# Patient Record
Sex: MALE | Race: ASIAN | NOT HISPANIC OR LATINO | URBAN - METROPOLITAN AREA
[De-identification: names, ages, dates, MRNs, and addresses within clinical notes are randomized per-mention and may not be internally consistent; named-entity substitution may affect disease eponyms.]

---

## 2021-02-05 ENCOUNTER — INPATIENT (INPATIENT)
Facility: HOSPITAL | Age: 51
LOS: 3 days | Discharge: HOME | DRG: 177 | End: 2021-02-09
Attending: INTERNAL MEDICINE | Admitting: INTERNAL MEDICINE
Payer: COMMERCIAL

## 2021-02-05 VITALS
RESPIRATION RATE: 16 BRPM | DIASTOLIC BLOOD PRESSURE: 92 MMHG | WEIGHT: 218.26 LBS | TEMPERATURE: 97 F | HEART RATE: 107 BPM | OXYGEN SATURATION: 99 % | SYSTOLIC BLOOD PRESSURE: 161 MMHG

## 2021-02-05 LAB
ALBUMIN SERPL ELPH-MCNC: 3.7 G/DL — SIGNIFICANT CHANGE UP (ref 3.5–5)
ALP SERPL-CCNC: 66 U/L — SIGNIFICANT CHANGE UP (ref 40–120)
ALT FLD-CCNC: 34 U/L DA — SIGNIFICANT CHANGE UP (ref 10–60)
ANION GAP SERPL CALC-SCNC: 9 MMOL/L — SIGNIFICANT CHANGE UP (ref 5–17)
APTT BLD: 34.3 SEC — SIGNIFICANT CHANGE UP (ref 27.5–35.5)
AST SERPL-CCNC: 19 U/L — SIGNIFICANT CHANGE UP (ref 10–40)
BASOPHILS # BLD AUTO: 0.01 K/UL — SIGNIFICANT CHANGE UP (ref 0–0.2)
BASOPHILS NFR BLD AUTO: 0.2 % — SIGNIFICANT CHANGE UP (ref 0–2)
BILIRUB SERPL-MCNC: 0.7 MG/DL — SIGNIFICANT CHANGE UP (ref 0.2–1.2)
BUN SERPL-MCNC: 13 MG/DL — SIGNIFICANT CHANGE UP (ref 7–18)
CALCIUM SERPL-MCNC: 8.7 MG/DL — SIGNIFICANT CHANGE UP (ref 8.4–10.5)
CHLORIDE SERPL-SCNC: 105 MMOL/L — SIGNIFICANT CHANGE UP (ref 96–108)
CO2 SERPL-SCNC: 26 MMOL/L — SIGNIFICANT CHANGE UP (ref 22–31)
CREAT SERPL-MCNC: 0.75 MG/DL — SIGNIFICANT CHANGE UP (ref 0.5–1.3)
EOSINOPHIL # BLD AUTO: 0.02 K/UL — SIGNIFICANT CHANGE UP (ref 0–0.5)
EOSINOPHIL NFR BLD AUTO: 0.3 % — SIGNIFICANT CHANGE UP (ref 0–6)
GLUCOSE SERPL-MCNC: 131 MG/DL — HIGH (ref 70–99)
HCT VFR BLD CALC: 39.3 % — SIGNIFICANT CHANGE UP (ref 39–50)
HGB BLD-MCNC: 13.3 G/DL — SIGNIFICANT CHANGE UP (ref 13–17)
IMM GRANULOCYTES NFR BLD AUTO: 0.2 % — SIGNIFICANT CHANGE UP (ref 0–1.5)
INR BLD: 1.09 RATIO — SIGNIFICANT CHANGE UP (ref 0.88–1.16)
LYMPHOCYTES # BLD AUTO: 1.02 K/UL — SIGNIFICANT CHANGE UP (ref 1–3.3)
LYMPHOCYTES # BLD AUTO: 17.2 % — SIGNIFICANT CHANGE UP (ref 13–44)
MAGNESIUM SERPL-MCNC: 1.9 MG/DL — SIGNIFICANT CHANGE UP (ref 1.6–2.6)
MCHC RBC-ENTMCNC: 30.3 PG — SIGNIFICANT CHANGE UP (ref 27–34)
MCHC RBC-ENTMCNC: 33.8 GM/DL — SIGNIFICANT CHANGE UP (ref 32–36)
MCV RBC AUTO: 89.5 FL — SIGNIFICANT CHANGE UP (ref 80–100)
MONOCYTES # BLD AUTO: 0.51 K/UL — SIGNIFICANT CHANGE UP (ref 0–0.9)
MONOCYTES NFR BLD AUTO: 8.6 % — SIGNIFICANT CHANGE UP (ref 2–14)
NEUTROPHILS # BLD AUTO: 4.35 K/UL — SIGNIFICANT CHANGE UP (ref 1.8–7.4)
NEUTROPHILS NFR BLD AUTO: 73.5 % — SIGNIFICANT CHANGE UP (ref 43–77)
NRBC # BLD: 0 /100 WBCS — SIGNIFICANT CHANGE UP (ref 0–0)
NT-PROBNP SERPL-SCNC: 1705 PG/ML — HIGH (ref 0–125)
PLATELET # BLD AUTO: 215 K/UL — SIGNIFICANT CHANGE UP (ref 150–400)
POTASSIUM SERPL-MCNC: 3.6 MMOL/L — SIGNIFICANT CHANGE UP (ref 3.5–5.3)
POTASSIUM SERPL-SCNC: 3.6 MMOL/L — SIGNIFICANT CHANGE UP (ref 3.5–5.3)
PROT SERPL-MCNC: 7.2 G/DL — SIGNIFICANT CHANGE UP (ref 6–8.3)
PROTHROM AB SERPL-ACNC: 12.9 SEC — SIGNIFICANT CHANGE UP (ref 10.6–13.6)
RBC # BLD: 4.39 M/UL — SIGNIFICANT CHANGE UP (ref 4.2–5.8)
RBC # FLD: 12.7 % — SIGNIFICANT CHANGE UP (ref 10.3–14.5)
SODIUM SERPL-SCNC: 140 MMOL/L — SIGNIFICANT CHANGE UP (ref 135–145)
TROPONIN I SERPL-MCNC: 0.39 NG/ML — HIGH (ref 0–0.04)
WBC # BLD: 5.92 K/UL — SIGNIFICANT CHANGE UP (ref 3.8–10.5)
WBC # FLD AUTO: 5.92 K/UL — SIGNIFICANT CHANGE UP (ref 3.8–10.5)

## 2021-02-05 PROCEDURE — 99285 EMERGENCY DEPT VISIT HI MDM: CPT

## 2021-02-05 PROCEDURE — 71046 X-RAY EXAM CHEST 2 VIEWS: CPT | Mod: 26

## 2021-02-05 RX ORDER — ASPIRIN/CALCIUM CARB/MAGNESIUM 324 MG
324 TABLET ORAL ONCE
Refills: 0 | Status: COMPLETED | OUTPATIENT
Start: 2021-02-05 | End: 2021-02-06

## 2021-02-05 RX ORDER — FUROSEMIDE 40 MG
40 TABLET ORAL ONCE
Refills: 0 | Status: COMPLETED | OUTPATIENT
Start: 2021-02-05 | End: 2021-02-05

## 2021-02-05 RX ORDER — NITROGLYCERIN 6.5 MG
0.4 CAPSULE, EXTENDED RELEASE ORAL ONCE
Refills: 0 | Status: COMPLETED | OUTPATIENT
Start: 2021-02-05 | End: 2021-02-05

## 2021-02-05 RX ORDER — DIAZEPAM 5 MG
2 TABLET ORAL ONCE
Refills: 0 | Status: DISCONTINUED | OUTPATIENT
Start: 2021-02-05 | End: 2021-02-05

## 2021-02-05 RX ADMIN — Medication 1.25 MILLIGRAM(S): at 23:15

## 2021-02-05 RX ADMIN — Medication 40 MILLIGRAM(S): at 23:15

## 2021-02-05 RX ADMIN — Medication 0.4 MILLIGRAM(S): at 23:16

## 2021-02-05 NOTE — ED PROVIDER NOTE - OBJECTIVE STATEMENT
50 y/o M pt with a PMHx of CVA 10 years ago (no residual deficits), HTN with no medication, anxiety, presents to the ED with complaints of 1 month of worsening shortness of breath, dyspnea on exertion, chest pain on exertion, orthopnea, dry cough and b/l leg swelling increasing over the last week. Patient reports today when going down stairs at home, he felt lightheaded and almost passed out. Notes he has been attributing symptoms to chiropractor visits for 1 month, though there is no clear reason how that is related. No history of CHF, heart problems or blood clots. States he has been seen by his PMD 5 months ago and offered blood pressure medication but has not been taking them. Patient denies fever or infectious symptoms, urinary symptoms, focal numbness or weakness, nausea, vomiting, or other recent illnesses or hospitalizations.

## 2021-02-05 NOTE — ED PROVIDER NOTE - CHPI ED SYMPTOMS NEG
no infectious symptoms, no urinary symptoms, no focal numbness or weakness, no nausea, no vomiting/no fever

## 2021-02-05 NOTE — ED ADULT TRIAGE NOTE - CHIEF COMPLAINT QUOTE
I have shortness of breath after chiropractic therapy x 1 month, getting progressively worse.  I went to see the chiropractor for back pain

## 2021-02-05 NOTE — ED PROVIDER NOTE - EKG ADDITIONAL INFORMATION FREE TEXT
Pt tolerated flu and td vaccine to right deltoid without difficulty; no adverse reaction noted; VIS given     LBBB, NSR, No depressions/elevations

## 2021-02-05 NOTE — ED PROVIDER NOTE - CLINICAL SUMMARY MEDICAL DECISION MAKING FREE TEXT BOX
50 y/o M pt presents with classic symptoms of progressive heart failure over 1 month. Bedside US showing decreased EF likely under 30% with no pericardial effusion and CXR showing b/l pleural effusion and PE. EKG showing incomplete LBBB. Labs pending. Giving Lasix, Enalaprilat and Nitro. Will plan to admit patient. Patient is stable, will reassess. 52 y/o M pt presents with classic symptoms of progressive heart failure over 1 month. Bedside US showing decreased EF likely under 30% with no pericardial effusion and CXR showing b/l pleural effusion and PE. EKG showing incomplete LBBB. Labs pending. Giving Lasix, Enalaprilat and Nitro. Will plan to admit patient. Patient is stable, will reassess.    Labs showing mild trop elevation (no active chest pain nor chest pain today) and BNP elevation. Giving ASA and admitting for new CHF. Pt stable and endorsed to inpatient team.

## 2021-02-05 NOTE — ED PROVIDER NOTE - PHYSICAL EXAMINATION
Vital Signs Reviewed  GEN: Comfortable, NAD, AAOx3, speaking in full sentences.   HEENT: NCAT, EOMI, MMM, Neck Supple  RESP: CTAB, b/l rales with normal air entry and nml work of breathing   CV: RRR, S1S2, No murmurs  ABD: No TTP, ND, No masses, No CVA Tenderness  Extrem/Skin: Equal pulses bilat, skin with b/l 1+ pitting edema without skin changes   Neuro: No focal deficits

## 2021-02-05 NOTE — ED ADULT NURSE NOTE - OBJECTIVE STATEMENT
came to ED due to pain left lower ribs goes to back for a month not getting better , states under chiropractor's care. came to ED due to pain left lower ribs goes to back for a month not getting better with shortness of breath at times, states under chiropractor's care.

## 2021-02-06 DIAGNOSIS — Z29.9 ENCOUNTER FOR PROPHYLACTIC MEASURES, UNSPECIFIED: ICD-10-CM

## 2021-02-06 DIAGNOSIS — E11.9 TYPE 2 DIABETES MELLITUS WITHOUT COMPLICATIONS: ICD-10-CM

## 2021-02-06 DIAGNOSIS — I50.9 HEART FAILURE, UNSPECIFIED: ICD-10-CM

## 2021-02-06 DIAGNOSIS — Z78.9 OTHER SPECIFIED HEALTH STATUS: Chronic | ICD-10-CM

## 2021-02-06 DIAGNOSIS — I10 ESSENTIAL (PRIMARY) HYPERTENSION: ICD-10-CM

## 2021-02-06 DIAGNOSIS — F41.9 ANXIETY DISORDER, UNSPECIFIED: ICD-10-CM

## 2021-02-06 DIAGNOSIS — I50.20 UNSPECIFIED SYSTOLIC (CONGESTIVE) HEART FAILURE: ICD-10-CM

## 2021-02-06 DIAGNOSIS — U07.1 COVID-19: ICD-10-CM

## 2021-02-06 LAB
A1C WITH ESTIMATED AVERAGE GLUCOSE RESULT: 7.8 % — HIGH (ref 4–5.6)
ANION GAP SERPL CALC-SCNC: 8 MMOL/L — SIGNIFICANT CHANGE UP (ref 5–17)
APTT BLD: 31.6 SEC — SIGNIFICANT CHANGE UP (ref 27.5–35.5)
BUN SERPL-MCNC: 12 MG/DL — SIGNIFICANT CHANGE UP (ref 7–18)
CALCIUM SERPL-MCNC: 8.7 MG/DL — SIGNIFICANT CHANGE UP (ref 8.4–10.5)
CHLORIDE SERPL-SCNC: 103 MMOL/L — SIGNIFICANT CHANGE UP (ref 96–108)
CHOLEST SERPL-MCNC: 204 MG/DL — HIGH
CO2 SERPL-SCNC: 29 MMOL/L — SIGNIFICANT CHANGE UP (ref 22–31)
CREAT SERPL-MCNC: 0.79 MG/DL — SIGNIFICANT CHANGE UP (ref 0.5–1.3)
D DIMER BLD IA.RAPID-MCNC: 220 NG/ML DDU — SIGNIFICANT CHANGE UP
ESTIMATED AVERAGE GLUCOSE: 177 MG/DL — HIGH (ref 68–114)
FERRITIN SERPL-MCNC: 1579 NG/ML — HIGH (ref 30–400)
FOLATE SERPL-MCNC: 13.7 NG/ML — SIGNIFICANT CHANGE UP
GLUCOSE BLDC GLUCOMTR-MCNC: 127 MG/DL — HIGH (ref 70–99)
GLUCOSE BLDC GLUCOMTR-MCNC: 159 MG/DL — HIGH (ref 70–99)
GLUCOSE BLDC GLUCOMTR-MCNC: 167 MG/DL — HIGH (ref 70–99)
GLUCOSE BLDC GLUCOMTR-MCNC: 181 MG/DL — HIGH (ref 70–99)
GLUCOSE SERPL-MCNC: 109 MG/DL — HIGH (ref 70–99)
HCT VFR BLD CALC: 38.7 % — LOW (ref 39–50)
HDLC SERPL-MCNC: 42 MG/DL — SIGNIFICANT CHANGE UP
HGB BLD-MCNC: 13.2 G/DL — SIGNIFICANT CHANGE UP (ref 13–17)
INR BLD: 1.16 RATIO — SIGNIFICANT CHANGE UP (ref 0.88–1.16)
LDH SERPL L TO P-CCNC: 167 U/L — SIGNIFICANT CHANGE UP (ref 120–225)
LIPID PNL WITH DIRECT LDL SERPL: 140 MG/DL — HIGH
MAGNESIUM SERPL-MCNC: 2 MG/DL — SIGNIFICANT CHANGE UP (ref 1.6–2.6)
MCHC RBC-ENTMCNC: 30.3 PG — SIGNIFICANT CHANGE UP (ref 27–34)
MCHC RBC-ENTMCNC: 34.1 GM/DL — SIGNIFICANT CHANGE UP (ref 32–36)
MCV RBC AUTO: 88.8 FL — SIGNIFICANT CHANGE UP (ref 80–100)
NON HDL CHOLESTEROL: 162 MG/DL — HIGH
NRBC # BLD: 0 /100 WBCS — SIGNIFICANT CHANGE UP (ref 0–0)
PHOSPHATE SERPL-MCNC: 4.2 MG/DL — SIGNIFICANT CHANGE UP (ref 2.5–4.5)
PLATELET # BLD AUTO: 220 K/UL — SIGNIFICANT CHANGE UP (ref 150–400)
POTASSIUM SERPL-MCNC: 3.2 MMOL/L — LOW (ref 3.5–5.3)
POTASSIUM SERPL-SCNC: 3.2 MMOL/L — LOW (ref 3.5–5.3)
PROCALCITONIN SERPL-MCNC: 0.07 NG/ML — SIGNIFICANT CHANGE UP (ref 0.02–0.1)
PROTHROM AB SERPL-ACNC: 13.7 SEC — HIGH (ref 10.6–13.6)
RBC # BLD: 4.36 M/UL — SIGNIFICANT CHANGE UP (ref 4.2–5.8)
RBC # FLD: 12.8 % — SIGNIFICANT CHANGE UP (ref 10.3–14.5)
SARS-COV-2 RNA SPEC QL NAA+PROBE: DETECTED
SODIUM SERPL-SCNC: 140 MMOL/L — SIGNIFICANT CHANGE UP (ref 135–145)
TRIGL SERPL-MCNC: 108 MG/DL — SIGNIFICANT CHANGE UP
TROPONIN I SERPL-MCNC: 0.33 NG/ML — HIGH (ref 0–0.04)
TROPONIN I SERPL-MCNC: 0.34 NG/ML — HIGH (ref 0–0.04)
TSH SERPL-MCNC: 1.15 UU/ML — SIGNIFICANT CHANGE UP (ref 0.34–4.82)
VIT B12 SERPL-MCNC: 464 PG/ML — SIGNIFICANT CHANGE UP (ref 232–1245)
WBC # BLD: 5.42 K/UL — SIGNIFICANT CHANGE UP (ref 3.8–10.5)
WBC # FLD AUTO: 5.42 K/UL — SIGNIFICANT CHANGE UP (ref 3.8–10.5)

## 2021-02-06 PROCEDURE — 99222 1ST HOSP IP/OBS MODERATE 55: CPT

## 2021-02-06 RX ORDER — INSULIN LISPRO 100/ML
VIAL (ML) SUBCUTANEOUS
Refills: 0 | Status: DISCONTINUED | OUTPATIENT
Start: 2021-02-06 | End: 2021-02-09

## 2021-02-06 RX ORDER — ENOXAPARIN SODIUM 100 MG/ML
40 INJECTION SUBCUTANEOUS DAILY
Refills: 0 | Status: DISCONTINUED | OUTPATIENT
Start: 2021-02-06 | End: 2021-02-09

## 2021-02-06 RX ORDER — ALPRAZOLAM 0.25 MG
0.5 TABLET ORAL
Refills: 0 | Status: DISCONTINUED | OUTPATIENT
Start: 2021-02-06 | End: 2021-02-09

## 2021-02-06 RX ORDER — LISINOPRIL 2.5 MG/1
2.5 TABLET ORAL DAILY
Refills: 0 | Status: DISCONTINUED | OUTPATIENT
Start: 2021-02-06 | End: 2021-02-09

## 2021-02-06 RX ORDER — ALBUTEROL 90 UG/1
2 AEROSOL, METERED ORAL EVERY 6 HOURS
Refills: 0 | Status: DISCONTINUED | OUTPATIENT
Start: 2021-02-06 | End: 2021-02-09

## 2021-02-06 RX ORDER — ACETAMINOPHEN 500 MG
650 TABLET ORAL EVERY 6 HOURS
Refills: 0 | Status: DISCONTINUED | OUTPATIENT
Start: 2021-02-06 | End: 2021-02-09

## 2021-02-06 RX ORDER — FUROSEMIDE 40 MG
40 TABLET ORAL
Refills: 0 | Status: DISCONTINUED | OUTPATIENT
Start: 2021-02-06 | End: 2021-02-07

## 2021-02-06 RX ORDER — ASPIRIN/CALCIUM CARB/MAGNESIUM 324 MG
81 TABLET ORAL DAILY
Refills: 0 | Status: DISCONTINUED | OUTPATIENT
Start: 2021-02-06 | End: 2021-02-09

## 2021-02-06 RX ORDER — CARVEDILOL PHOSPHATE 80 MG/1
3.12 CAPSULE, EXTENDED RELEASE ORAL EVERY 12 HOURS
Refills: 0 | Status: DISCONTINUED | OUTPATIENT
Start: 2021-02-06 | End: 2021-02-09

## 2021-02-06 RX ORDER — ATORVASTATIN CALCIUM 80 MG/1
80 TABLET, FILM COATED ORAL AT BEDTIME
Refills: 0 | Status: DISCONTINUED | OUTPATIENT
Start: 2021-02-06 | End: 2021-02-09

## 2021-02-06 RX ADMIN — Medication 40 MILLIGRAM(S): at 17:30

## 2021-02-06 RX ADMIN — Medication 40 MILLIGRAM(S): at 04:23

## 2021-02-06 RX ADMIN — CARVEDILOL PHOSPHATE 3.12 MILLIGRAM(S): 80 CAPSULE, EXTENDED RELEASE ORAL at 17:32

## 2021-02-06 RX ADMIN — LISINOPRIL 2.5 MILLIGRAM(S): 2.5 TABLET ORAL at 04:24

## 2021-02-06 RX ADMIN — Medication 324 MILLIGRAM(S): at 00:04

## 2021-02-06 RX ADMIN — Medication 2 MILLIGRAM(S): at 00:04

## 2021-02-06 RX ADMIN — Medication 81 MILLIGRAM(S): at 12:28

## 2021-02-06 RX ADMIN — Medication 0.5 MILLIGRAM(S): at 14:05

## 2021-02-06 NOTE — H&P ADULT - HISTORY OF PRESENT ILLNESS
50 y/o M pt with a PMHx of CVA 10 years ago (no residual deficits), HTN? with no medication, anxiety presents to the ED with complaints of 1 month of worsening shortness of breath, dyspnea on exertion, chest pain on exertion, orthopnea, PND, dry cough and b/l leg swelling increasing over the last week. Patient reports today when going down stairs at home, he felt lightheaded and almost passed out. Pt endorses he has been attributing symptoms to chiropractor visits for 1 month, though there is no clear reason how that is related. No history of CHF, heart problems or blood clots.  Patient states he has been seen by his PCP few months ago and was offered blood pressure medication which he has not been taking.  Denies nausea, vomiting, diarrhea, abdominal pain, chest pain, wheezing, fever, chills.

## 2021-02-06 NOTE — H&P ADULT - ASSESSMENT
50 y/o M pt with a PMHx of CVA 10 years ago (no residual deficits), HTN? with no medication, anxiety presents to the ED with complaints of 1 month of worsening shortness of breath, dyspnea on exertion, chest pain on exertion, orthopnea, PND, dry cough and b/l leg swelling increasing over the last week. Pt is admitted for work up of SOB  In ED bedside echo showed EF approx 30%

## 2021-02-06 NOTE — H&P ADULT - PROBLEM SELECTOR PLAN 3
p/w SOB, cough, pnd, orthopnea  - WBC: WNL, lymphocyte; WNl   - CXR as above   - contact and airborne isolation precaution   - fu LDH, Procalcitonin, ferritin   - Tylenol, Albuterol Inhaler PRN

## 2021-02-06 NOTE — H&P ADULT - PROBLEM SELECTOR PLAN 2
- fu a1C  - pt on diet control only, denies any medication use  - started on diabetic diet  - cw HSS

## 2021-02-06 NOTE — H&P ADULT - ATTENDING COMMENTS
Patient is a 51 year old male with a PMH of HTN, h/o CVA 10 years ago who presented with a chief complaint of shortness of breath.  Patient states that beginning approximately 1 month prior to current presentation, he began to experience progressively worsening shortness of breath, particularly with exertion and relieved with rest.  Patient further endorses orthopnea as well as multiple episodes of PND.  Additionally, patient states that beginning 4 days ago he also began to notice worsening swelling of his bilateral lower extremities.    At time of examination, patient denies any headache, dizziness, chest pain, palpitations, abdominal pain, nausea/vomiting/diarrhea.    T(C): 37.2 (02-06-21 @ 00:07), Max: 37.2 (02-06-21 @ 00:07)  T(F): 99 (02-06-21 @ 00:07), Max: 99 (02-06-21 @ 00:07)  HR: 99 (02-06-21 @ 00:07) (99 - 109)  BP: 121/79 (02-06-21 @ 00:07) (121/79 - 161/92)  RR: 18 (02-06-21 @ 00:07) (16 - 18)  SpO2: 96% (02-06-21 @ 00:07) (94% - 99%)  Wt(kg): --    P/E: As above MAR    A/P:    Shortness of Breath likely due to Acute Decompensated Heart Failure:  -Pro-BNP= 1,705 (No baseline available)  -Troponin= 0.393  -Chest film, though portable, appears to demonstrate haziness in the bilateral lower lung fields blunting appreciation of the costophrenic angles  -Will admit patient to Telemetry for continuous cardiac monitoring  -Will continue trending troponin with simultaneous acquisition of EKG  -Daily Weights, Low Sodium Diet, Strict I&Os, Fluid Restrictions  -Will send Lipid Panel, Hemoglobin A1c, Thyroid Panel  -Will start patient on Lasix 40mg BID with continued titration in order to produce adequate diuresis  -Will also start patient on Aspirin and Statin  -TTE  -Will need to ask Cardiology to evaluate patient for further recommendations    Uncontrolled Blood Glucose:  -Hemoglobin A1c with AM labs  -Blood Glucose Monitoring ACHS  -Regular Insulin Sliding Scale ACHS  -Carb Controlled, Heart Healthy, Low Sodium diet as tolerated    HTN:  -Will resume patient's home medication  -Vital Signs Q4H    GI/DVT PPx:  -Lovenox  -PPI Patient is a 51 year old male with a PMH of HTN, h/o CVA 10 years ago who presented with a chief complaint of shortness of breath.  Patient states that beginning approximately 1 month prior to current presentation, he began to experience progressively worsening shortness of breath, particularly with exertion and relieved with rest.  Patient further endorses orthopnea as well as multiple episodes of PND.  Additionally, patient states that beginning 4 days ago he also began to notice worsening swelling of his bilateral lower extremities.    At time of examination, patient denies any headache, dizziness, chest pain, palpitations, abdominal pain, nausea/vomiting/diarrhea.    T(C): 37.2 (02-06-21 @ 00:07), Max: 37.2 (02-06-21 @ 00:07)  T(F): 99 (02-06-21 @ 00:07), Max: 99 (02-06-21 @ 00:07)  HR: 99 (02-06-21 @ 00:07) (99 - 109)  BP: 121/79 (02-06-21 @ 00:07) (121/79 - 161/92)  RR: 18 (02-06-21 @ 00:07) (16 - 18)  SpO2: 96% (02-06-21 @ 00:07) (94% - 99%)  Wt(kg): --    P/E: As above MAR    A/P:    Shortness of Breath likely due to Acute Decompensated Heart Failure:  -Pro-BNP= 1,705 (No baseline available)  -Troponin= 0.393  -Chest film, though portable, appears to demonstrate haziness in the bilateral lower lung fields blunting appreciation of the costophrenic angles  -Will admit patient to Telemetry for continuous cardiac monitoring  -Will continue trending troponin with simultaneous acquisition of EKG  -Daily Weights, Low Sodium Diet, Strict I&Os, Fluid Restrictions  -Will send Lipid Panel, Hemoglobin A1c, Thyroid Panel  -Will start patient on Lasix 40mg BID with continued titration in order to produce adequate diuresis  -Will also start patient on Aspirin, ACE-I and Statin  -TTE  -Will need to ask Cardiology to evaluate patient for further recommendations    Uncontrolled Blood Glucose:  -Hemoglobin A1c with AM labs  -Blood Glucose Monitoring ACHS  -Regular Insulin Sliding Scale ACHS  -Carb Controlled, Heart Healthy, Low Sodium diet as tolerated    HTN:  -Will resume patient's home medication  -Vital Signs Q4H    GI/DVT PPx:  -Lovenox  -PPI Patient is a 51 year old male with a PMH of HTN, h/o CVA 10 years ago who presented with a chief complaint of shortness of breath.  Patient states that beginning approximately 1 month prior to current presentation, he began to experience progressively worsening shortness of breath, particularly with exertion and relieved with rest.  Patient further endorses orthopnea as well as multiple episodes of PND.  Additionally, patient states that beginning 4 days ago he also began to notice worsening swelling of his bilateral lower extremities.    At time of examination, patient denies any headache, dizziness, chest pain, palpitations, abdominal pain, nausea/vomiting/diarrhea.    T(C): 37.2 (02-06-21 @ 00:07), Max: 37.2 (02-06-21 @ 00:07)  T(F): 99 (02-06-21 @ 00:07), Max: 99 (02-06-21 @ 00:07)  HR: 99 (02-06-21 @ 00:07) (99 - 109)  BP: 121/79 (02-06-21 @ 00:07) (121/79 - 161/92)  RR: 18 (02-06-21 @ 00:07) (16 - 18)  SpO2: 96% (02-06-21 @ 00:07) (94% - 99%)  Wt(kg): --    P/E: As above MAR    A/P:    Shortness of Breath possibly due to Non-Severe COVID vs Acute Decompensated Heart Failure:  -Pro-BNP= 1,705 (No baseline available)  -Troponin= 0.393  -COVID= Detected  -Chest film, though portable, appears to demonstrate haziness in the bilateral lower lung fields blunting appreciation of the costophrenic angles  -Will admit patient to Telemetry for continuous cardiac monitoring  -Will continue trending troponin with simultaneous acquisition of EKG  -Daily Weights, Low Sodium Diet, Strict I&Os, Fluid Restrictions  -Will send Lipid Panel, Hemoglobin A1c, Thyroid Panel  -Will start patient on Lasix 40mg BID with continued titration in order to produce adequate diuresis  -Will also start patient on Aspirin, ACE-I and Statin  -TTE  -Will need to ask Cardiology to evaluate patient for further recommendations  -At time of examination in the ED, patient is not requiring supplemental oxygen.  Therefore, patient can be categorized as Non-Severe Disease.    -Will start patient on Lovenox  -Will also start patient on Albuterol MDI Q2H PRN  -Awaiting ESR, CRP, Procalcitonin to determine need for antimicrobials, though will hold for now  -Will continue to trend d-dimer, CRP, LDH, Troponin, Ferritin, CPK  -Tylenol PRN for fever  -Will continue to provide patient with any and all additional supportive care as necessary  -Will ask Infectious Disease to evaluate patient for further recommendations, such as Remdesivir and/or steroids    Uncontrolled Blood Glucose:  -Hemoglobin A1c with AM labs  -Blood Glucose Monitoring ACHS  -Regular Insulin Sliding Scale ACHS  -Carb Controlled, Heart Healthy, Low Sodium diet as tolerated    HTN:  -Will resume patient's home medication  -Vital Signs Q4H    GI/DVT PPx:  -Lovenox  -PPI

## 2021-02-06 NOTE — H&P ADULT - NSHPPHYSICALEXAM_GEN_ALL_CORE
Vital Signs Last 24 Hrs  T(C): 37.2 (06 Feb 2021 00:07), Max: 37.2 (06 Feb 2021 00:07)  T(F): 99 (06 Feb 2021 00:07), Max: 99 (06 Feb 2021 00:07)  HR: 99 (06 Feb 2021 00:07) (99 - 109)  BP: 121/79 (06 Feb 2021 00:07) (121/79 - 161/92)  BP(mean): --  RR: 18 (06 Feb 2021 00:07) (16 - 18)  SpO2: 96% (06 Feb 2021 00:07) (94% - 99%)    GENERAL: NAD, lying in bed comfortably  HEAD:  Atraumatic, Normocephalic  EYES: EOMI, PERRLA, conjunctiva and sclera clear  ENT: Moist mucous membranes  NECK: Supple, No JVD  CHEST/LUNG: good air entry, positive for bl Rhales,   HEART: Regular rate and rhythm; S1+ S2+  ABDOMEN: Bowel sounds present; Soft, Nontender, Nondistended.  EXTREMITIES: 3+ BL LE pitting edema. 2+ Peripheral Pulses, brisk capillary refill. No clubbing, cyanosis  NERVOUS SYSTEM:  Alert & Oriented X3, speech clear. No deficits   MSK: FROM all 4 extremities, full and equal strength  SKIN: No rashes or lesions

## 2021-02-06 NOTE — CHART NOTE - NSCHARTNOTEFT_GEN_A_CORE
Medicine Accept Note    HPI/ROS/Interval Events:    Medications:    Physical Exam: Medicine Accept Note    HPI/ROS/Interval Events: Briefly - assigned pt at 8:30AM. Saw pt this AM. Feeling OK, no SOB, no CP, no HA.    History: 50 y/o M pt with a PMHx of CVA 10 years ago (no residual deficits), HTN? with no medication, anxiety presents to the ED with complaints of 1 month of worsening shortness of breath, dyspnea on exertion, chest pain on exertion, orthopnea, PND, dry cough and b/l leg swelling increasing over the last week. Patient reports today when going down stairs at home, he felt lightheaded and almost passed out. Pt endorses he has been attributing symptoms to chiropractor visits for 1 month, though there is no clear reason how that is related. No history of CHF, heart problems or blood clots.  Patient states he has been seen by his PCP few months ago and was offered blood pressure medication which he has not been taking.    Reviewed vitals, labs - COVID pos, A1C 7.8, BNP ?elevated, trops went down then up.    Medications:  MEDICATIONS  (STANDING):  aspirin enteric coated 81 milliGRAM(s) Oral daily  atorvastatin 80 milliGRAM(s) Oral at bedtime  enoxaparin Injectable 40 milliGRAM(s) SubCutaneous daily  furosemide   Injectable 40 milliGRAM(s) IV Push two times a day  insulin lispro (ADMELOG) corrective regimen sliding scale   SubCutaneous Before meals and at bedtime  lisinopril 2.5 milliGRAM(s) Oral daily    MEDICATIONS  (PRN):  acetaminophen   Tablet .. 650 milliGRAM(s) Oral every 6 hours PRN Temp greater or equal to 38C (100.4F), Mild Pain (1 - 3)  ALBUTerol    90 MICROgram(s) HFA Inhaler 2 Puff(s) Inhalation every 6 hours PRN Shortness of Breath  ALPRAZolam 0.5 milliGRAM(s) Oral two times a day PRN anxiety/ panic attack    Physical Exam:  On physical exam:  Gen: NAD  male sitting on bed  Chest/CV: RRR, +2 peripheral pulses  Pulm: reduced breath sounds B/L  Abd: soft, NT, ND +BS  MSK: no peripheral edema/cyanosis     Plan:  #CHF exacerbation?  -TTE  -daily weights, strict Is/Os  -monitor on tele  -diuresis with Lasix  -trend CE  -maintain K+>4 Mg2+>2  -DASH/TLC diet  -cards f/u  -c/w statin, lasix, ACEI, BB - starting coreg    #COVID  -resp status stable  -monitor for now    #T2DM  -not well controlled at home  -A1C, FSBG, HISS   -will need to discuss outpt regimen with pt    Colleen Arcos MD  Division of Hospital Medicine Medicine Accept Note    HPI/ROS/Interval Events: Briefly - assigned pt at 8:30AM. Saw pt this AM. Feeling OK, no SOB, no CP, no HA.    History: 52 y/o M pt with a PMHx of CVA 10 years ago (no residual deficits), HTN? with no medication, anxiety presents to the ED with complaints of 1 month of worsening shortness of breath, dyspnea on exertion, chest pain on exertion, orthopnea, PND, dry cough and b/l leg swelling increasing over the last week. Patient reports today when going down stairs at home, he felt lightheaded and almost passed out. Pt endorses he has been attributing symptoms to chiropractor visits for 1 month, though there is no clear reason how that is related. No history of CHF, heart problems or blood clots.  Patient states he has been seen by his PCP few months ago and was offered blood pressure medication which he has not been taking.    Reviewed vitals, labs - COVID pos, A1C 7.8, BNP ?elevated, trops went down then up.    Medications:  MEDICATIONS  (STANDING):  aspirin enteric coated 81 milliGRAM(s) Oral daily  atorvastatin 80 milliGRAM(s) Oral at bedtime  enoxaparin Injectable 40 milliGRAM(s) SubCutaneous daily  furosemide   Injectable 40 milliGRAM(s) IV Push two times a day  insulin lispro (ADMELOG) corrective regimen sliding scale   SubCutaneous Before meals and at bedtime  lisinopril 2.5 milliGRAM(s) Oral daily    MEDICATIONS  (PRN):  acetaminophen   Tablet .. 650 milliGRAM(s) Oral every 6 hours PRN Temp greater or equal to 38C (100.4F), Mild Pain (1 - 3)  ALBUTerol    90 MICROgram(s) HFA Inhaler 2 Puff(s) Inhalation every 6 hours PRN Shortness of Breath  ALPRAZolam 0.5 milliGRAM(s) Oral two times a day PRN anxiety/ panic attack    Physical Exam:  On physical exam:  Gen: NAD  male sitting on bed  Chest/CV: RRR, +2 peripheral pulses  Pulm: reduced breath sounds B/L  Abd: soft, NT, ND +BS  MSK: no peripheral edema/cyanosis     Plan:  #CHF exacerbation?  -TTE  -daily weights, strict Is/Os  -monitor on tele  -diuresis with Lasix  -trend CE  -maintain K+>4 Mg2+>2  -DASH/TLC diet  -cards f/u  -c/w statin, lasix, ACEI, BB - starting coreg    #CVA  -on aspirin, statin    #COVID  -resp status stable  -monitor for now    #T2DM  -not well controlled at home  -A1C, FSBG, HISS   -was not taking meds at home    Colleen Arcos MD  Division of Hospital Medicine

## 2021-02-06 NOTE — H&P ADULT - PROBLEM SELECTOR PLAN 1
Pt presented with SOB, orthopnea, PND, jvd likely 2/2 Acute Decompensated Heart Failure:  -Pro-BNP= 1,705  -Trop 1= 0.393 with EKG showing LBBB; will trend Troponins with serial ekgs  -CXR showed bilateral lower lung fields blunting of the costophrenic angles  -Telemetry for continuous cardiac monitoring  -Daily Weights, Low Sodium Diet, Strict I&Os, Fluid Restrictions  -fu Lipid Panel, Hemoglobin A1c, Thyroid Panel  -started on Lasix 40mg BID  -started on Aspirin, lisinopril 2.5 mg and Statin 80 mg qd   -fu TTE  -Will Consult Dr. Fairchild

## 2021-02-07 DIAGNOSIS — I24.8 OTHER FORMS OF ACUTE ISCHEMIC HEART DISEASE: ICD-10-CM

## 2021-02-07 LAB
ANION GAP SERPL CALC-SCNC: 7 MMOL/L — SIGNIFICANT CHANGE UP (ref 5–17)
BUN SERPL-MCNC: 17 MG/DL — SIGNIFICANT CHANGE UP (ref 7–18)
CALCIUM SERPL-MCNC: 8.8 MG/DL — SIGNIFICANT CHANGE UP (ref 8.4–10.5)
CHLORIDE SERPL-SCNC: 105 MMOL/L — SIGNIFICANT CHANGE UP (ref 96–108)
CO2 SERPL-SCNC: 26 MMOL/L — SIGNIFICANT CHANGE UP (ref 22–31)
CREAT SERPL-MCNC: 0.77 MG/DL — SIGNIFICANT CHANGE UP (ref 0.5–1.3)
GLUCOSE BLDC GLUCOMTR-MCNC: 145 MG/DL — HIGH (ref 70–99)
GLUCOSE BLDC GLUCOMTR-MCNC: 165 MG/DL — HIGH (ref 70–99)
GLUCOSE BLDC GLUCOMTR-MCNC: 185 MG/DL — HIGH (ref 70–99)
GLUCOSE SERPL-MCNC: 140 MG/DL — HIGH (ref 70–99)
HCT VFR BLD CALC: 39.6 % — SIGNIFICANT CHANGE UP (ref 39–50)
HGB BLD-MCNC: 13.4 G/DL — SIGNIFICANT CHANGE UP (ref 13–17)
MAGNESIUM SERPL-MCNC: 2.1 MG/DL — SIGNIFICANT CHANGE UP (ref 1.6–2.6)
MCHC RBC-ENTMCNC: 30 PG — SIGNIFICANT CHANGE UP (ref 27–34)
MCHC RBC-ENTMCNC: 33.8 GM/DL — SIGNIFICANT CHANGE UP (ref 32–36)
MCV RBC AUTO: 88.6 FL — SIGNIFICANT CHANGE UP (ref 80–100)
NRBC # BLD: 0 /100 WBCS — SIGNIFICANT CHANGE UP (ref 0–0)
PHOSPHATE SERPL-MCNC: 4.1 MG/DL — SIGNIFICANT CHANGE UP (ref 2.5–4.5)
PLATELET # BLD AUTO: 218 K/UL — SIGNIFICANT CHANGE UP (ref 150–400)
POTASSIUM SERPL-MCNC: 3.3 MMOL/L — LOW (ref 3.5–5.3)
POTASSIUM SERPL-SCNC: 3.3 MMOL/L — LOW (ref 3.5–5.3)
RBC # BLD: 4.47 M/UL — SIGNIFICANT CHANGE UP (ref 4.2–5.8)
RBC # FLD: 13.2 % — SIGNIFICANT CHANGE UP (ref 10.3–14.5)
SODIUM SERPL-SCNC: 138 MMOL/L — SIGNIFICANT CHANGE UP (ref 135–145)
TROPONIN I SERPL-MCNC: 0.28 NG/ML — HIGH (ref 0–0.04)
WBC # BLD: 4.9 K/UL — SIGNIFICANT CHANGE UP (ref 3.8–10.5)
WBC # FLD AUTO: 4.9 K/UL — SIGNIFICANT CHANGE UP (ref 3.8–10.5)

## 2021-02-07 PROCEDURE — 99232 SBSQ HOSP IP/OBS MODERATE 35: CPT | Mod: GC

## 2021-02-07 RX ORDER — LANOLIN ALCOHOL/MO/W.PET/CERES
3 CREAM (GRAM) TOPICAL AT BEDTIME
Refills: 0 | Status: DISCONTINUED | OUTPATIENT
Start: 2021-02-07 | End: 2021-02-09

## 2021-02-07 RX ORDER — SENNA PLUS 8.6 MG/1
2 TABLET ORAL AT BEDTIME
Refills: 0 | Status: DISCONTINUED | OUTPATIENT
Start: 2021-02-07 | End: 2021-02-09

## 2021-02-07 RX ORDER — FUROSEMIDE 40 MG
40 TABLET ORAL
Refills: 0 | Status: DISCONTINUED | OUTPATIENT
Start: 2021-02-07 | End: 2021-02-09

## 2021-02-07 RX ORDER — POTASSIUM CHLORIDE 20 MEQ
40 PACKET (EA) ORAL ONCE
Refills: 0 | Status: COMPLETED | OUTPATIENT
Start: 2021-02-07 | End: 2021-02-07

## 2021-02-07 RX ORDER — KETOROLAC TROMETHAMINE 30 MG/ML
15 SYRINGE (ML) INJECTION ONCE
Refills: 0 | Status: DISCONTINUED | OUTPATIENT
Start: 2021-02-07 | End: 2021-02-09

## 2021-02-07 RX ADMIN — Medication 81 MILLIGRAM(S): at 11:08

## 2021-02-07 RX ADMIN — Medication 40 MILLIGRAM(S): at 21:34

## 2021-02-07 RX ADMIN — Medication 40 MILLIEQUIVALENT(S): at 09:39

## 2021-02-07 RX ADMIN — Medication 0.5 MILLIGRAM(S): at 06:08

## 2021-02-07 RX ADMIN — CARVEDILOL PHOSPHATE 3.12 MILLIGRAM(S): 80 CAPSULE, EXTENDED RELEASE ORAL at 17:00

## 2021-02-07 RX ADMIN — Medication 40 MILLIGRAM(S): at 11:08

## 2021-02-07 RX ADMIN — Medication 3 MILLIGRAM(S): at 21:34

## 2021-02-07 RX ADMIN — CARVEDILOL PHOSPHATE 3.12 MILLIGRAM(S): 80 CAPSULE, EXTENDED RELEASE ORAL at 05:45

## 2021-02-07 NOTE — PROGRESS NOTE ADULT - PROBLEM SELECTOR PLAN 6
IMPROVE VTE Individual Risk Assessment  RISK                                                         Points  [  ] Previous VTE                                      3  [  ] Thrombophilia                                   2  [  ] Lower limb paralysis                         2 (unable to hold up >15 seconds)    [  ] Current Cancer                                  2       (within 6 months)  [  ] Immobilization > 24 hrs                    1  [  ] ICU/CCU stay > 24 hrs                         1  [  ] Age > 60                                              1  cw lovenox for dvt ppx IMPROVE VTE Individual Risk Assessment  RISK                                                         Points  [  ] Previous VTE                                      3  [  ] Thrombophilia                                   2  [  ] Lower limb paralysis                         2 (unable to hold up >15 seconds)    [  ] Current Cancer                                  2       (within 6 months)  [  ] Immobilization > 24 hrs                    1  [  ] ICU/CCU stay > 24 hrs                         1  [  ] Age > 60                                              1  C/w Lovenox for DVT ppx -Continue with home medications -Abnormal high lipid profile   -C/w statin

## 2021-02-07 NOTE — PROGRESS NOTE ADULT - ATTENDING COMMENTS
I have personally seen, examined, and participated in the care of this patient this morning. I have reviewed all pertinent clinical information, including history, physical exam, plan and medical student/resident/PA/NP note, and agree, with my independent findings, alterations, and conclusions as noted:     Spoke with pt, examined pt - breathing better, swelling better, feels weak. Reviewed vitals, labs - hypokalemic, trops downtrending, FSBG OK.    52 y/o M pt with a PMHx of CVA 10 years ago (no residual deficits), HTN? with no medication, anxiety presents to the ED with complaints of 1 month of worsening shortness of breath, dyspnea on exertion, chest pain on exertion, orthopnea, PND, dry cough and b/l leg swelling increasing over the last week, likely ADHF in context of COVID. Not on medication at home.    #CHF exacerbation  -TTE  -daily weights, strict Is/Os - swelling improved, breathing improved  -monitor on tele  -diuresis with Lasix - switched to PO today BID  -trend CE  -maintain K+>4 Mg2+>2  -DASH/TLC diet  -c/w statin, lasix, ACEI, BB - starting coreg  -will eventually need ischemic w/u, but unlikely now as COVID-positive - for now will f/u echo and f/u cards recs    #CVA  -on aspirin, statin    #HTN  -on low dose lisinopril but BP soft - will c/w paramters    #COVID  -resp status stable  -monitor for now    #T2DM  -not well controlled at home  -A1C, FSBG, HISS   -was not taking meds at home    Colleen Arcos MD  Division of Hospital Medicine.

## 2021-02-07 NOTE — PROGRESS NOTE ADULT - PROBLEM SELECTOR PLAN 4
- will start on lisinopril for now  - adjust as needed -C/w lisinopril   -Monitor BP and adjust as needed -A1C 7.8  -Not on meds at home   -C/w HSS  -Target -180  -Monitor FS before meals and at bedtime  -Upon discharge Metformin 500 mg BID

## 2021-02-07 NOTE — PROGRESS NOTE ADULT - PROBLEM SELECTOR PLAN 1
-Pt presented with SOB, orthopnea, PND, JVD likely 2/2 Acute Decompensated Heart Failure  -Pro-BNP= 1,705  -Trop 1= 0.393 with EKG showing LBBB; will trend Troponin with serial EKG  -CXR showed bilateral lower lung fields blunting of the costophrenic angles  -Telemetry for continuous cardiac monitoring  -Daily Weights, Low Sodium Diet, Strict I&Os, Fluid Restrictions  -Lipid Panel abnormal, Hemoglobin A1c 7.8, TSH 1.15  -S/p Lasix 40mg IV BID  -Transitioned to Lasix 40 mg po BID   -C/w Aspirin, lisinopril and Statin  -F/u TTE  -Dr. Fairchild -Pt presented with SOB, orthopnea, PND, JVD likely 2/2 Acute Decompensated Heart Failure  -Pro-BNP= 1705  -Trop 0.393>0.327>0.343>0.277  -EKG with Q waves V1-V3, no ST changes   -CXR showed bilateral lower lung fields blunting of the costophrenic angles  -Telemetry for continuous cardiac monitoring  -Daily Weights, Low Sodium Diet, Strict I&Os, Fluid Restrictions  -Lipid Panel abnormal, Hemoglobin A1c 7.8, TSH 1.15  -S/p Lasix 40mg IV BID  -Transitioned to Lasix 40 mg po BID   -C/w Aspirin, lisinopril and Statin  -F/u TTE  -Dr. Fairchild on board -Pt presented with SOB, orthopnea, PND, JVD likely 2/2 Acute Decompensated Heart Failure  -Pro-BNP= 1705  -Trop 0.393>0.327>0.343>0.277  -EKG with Q waves V1-V3, no ST changes   -CXR showed bilateral lower lung fields blunting of the costophrenic angles  -Telemetry for continuous cardiac monitoring  -Daily Weights, Low Sodium Diet, Strict I&Os, Fluid Restrictions  -Lipid Panel abnormal, Hemoglobin A1c 7.8, TSH 1.15  -S/p Lasix 40mg IV BID  -Transitioned to Lasix 40 mg po BID   -C/w Aspirin, lisinopril and Statin  -K replaced x1 po  -F/u TTE  -Dr. Fairchild on board -Pt presented with SOB, orthopnea, PND, JVD likely 2/2 Acute Decompensated Heart Failure  -Pro-BNP= 1705  -CXR showed bilateral lower lung fields blunting of the costophrenic angles  -Telemetry for continuous cardiac monitoring  -Daily Weights, Low Sodium Diet, Strict I&Os, Fluid Restrictions  -Lipid Panel abnormal, Hemoglobin A1c 7.8, TSH 1.15  -S/p Lasix 40mg IV BID  -Transitioned to Lasix 40 mg po BID   -C/w Aspirin, lisinopril and Statin  -K replaced x1 po  -F/u TTE  -Dr. Fairchild on board

## 2021-02-07 NOTE — PROGRESS NOTE ADULT - PROBLEM SELECTOR PLAN 7
IMPROVE VTE Individual Risk Assessment  RISK                                                         Points  [  ] Previous VTE                                      3  [  ] Thrombophilia                                   2  [  ] Lower limb paralysis                         2 (unable to hold up >15 seconds)    [  ] Current Cancer                                  2       (within 6 months)  [  ] Immobilization > 24 hrs                    1  [  ] ICU/CCU stay > 24 hrs                         1  [  ] Age > 60                                              1  C/w Lovenox for DVT ppx -Continue with home medications

## 2021-02-07 NOTE — PROGRESS NOTE ADULT - SUBJECTIVE AND OBJECTIVE BOX
PGY-1 Progress Note discussed with attending    PAGER #: [[phone]] TILL 5:00 PM  PLEASE CONTACT ON CALL TEAM:  - On Call Team (Please refer to Quintin) FROM 5:00 PM - 8:30PM  - Nightfloat Team FROM 8:30 -7:30 AM    CHIEF COMPLAINT & BRIEF HOSPITAL COURSE:      INTERVAL HPI/OVERNIGHT EVENTS:       REVIEW OF SYSTEMS:  CONSTITUTIONAL: No fever, weight loss, or fatigue  RESPIRATORY: No cough, wheezing, chills or hemoptysis; No shortness of breath  CARDIOVASCULAR: No chest pain, palpitations, dizziness, or leg swelling  GASTROINTESTINAL: No abdominal pain. No nausea, vomiting, or hematemesis; No diarrhea or constipation. No melena or hematochezia.  GENITOURINARY: No dysuria or hematuria, urinary frequency  NEUROLOGICAL: No headaches, memory loss, loss of strength, numbness, or tremors  SKIN: No itching, burning, rashes, or lesions     MEDICATIONS  (STANDING):  aspirin enteric coated 81 milliGRAM(s) Oral daily  atorvastatin 80 milliGRAM(s) Oral at bedtime  carvedilol 3.125 milliGRAM(s) Oral every 12 hours  enoxaparin Injectable 40 milliGRAM(s) SubCutaneous daily  furosemide    Tablet 40 milliGRAM(s) Oral two times a day  insulin lispro (ADMELOG) corrective regimen sliding scale   SubCutaneous Before meals and at bedtime  lisinopril 2.5 milliGRAM(s) Oral daily    MEDICATIONS  (PRN):  acetaminophen   Tablet .. 650 milliGRAM(s) Oral every 6 hours PRN Temp greater or equal to 38C (100.4F), Mild Pain (1 - 3)  ALBUTerol    90 MICROgram(s) HFA Inhaler 2 Puff(s) Inhalation every 6 hours PRN Shortness of Breath  ALPRAZolam 0.5 milliGRAM(s) Oral two times a day PRN anxiety/ panic attack      Vital Signs Last 24 Hrs  T(C): 36.3 (07 Feb 2021 08:32), Max: 37.3 (06 Feb 2021 19:16)  T(F): 97.3 (07 Feb 2021 08:32), Max: 99.1 (06 Feb 2021 19:16)  HR: 79 (07 Feb 2021 08:32) (78 - 91)  BP: 94/63 (07 Feb 2021 08:32) (94/63 - 105/69)  BP(mean): --  RR: 17 (07 Feb 2021 08:32) (17 - 18)  SpO2: 99% (07 Feb 2021 08:32) (96% - 99%)    PHYSICAL EXAMINATION:  GENERAL: NAD, well built  HEAD:  Atraumatic, Normocephalic  EYES:  conjunctiva and sclera clear  NECK: Supple, No JVD, Normal thyroid  CHEST/LUNG: Clear to auscultation. Clear to percussion bilaterally; No rales, rhonchi, wheezing, or rubs  HEART: Regular rate and rhythm; No murmurs, rubs, or gallops  ABDOMEN: Soft, Nontender, Nondistended; Bowel sounds present, no pain or masses on palpation  NERVOUS SYSTEM:  Alert & Oriented X3  : voiding well  EXTREMITIES:  2+ Peripheral Pulses, No clubbing, cyanosis, or edema  SKIN: warm dry                          13.4   4.90  )-----------( 218      ( 07 Feb 2021 07:02 )             39.6     02-07    138  |  105  |  17  ----------------------------<  140<H>  3.3<L>   |  26  |  0.77    Ca    8.8      07 Feb 2021 07:02  Phos  4.1     02-07  Mg     2.1     02-07    TPro  7.2  /  Alb  3.7  /  TBili  0.7  /  DBili  x   /  AST  19  /  ALT  34  /  AlkPhos  66  02-05    LIVER FUNCTIONS - ( 05 Feb 2021 22:01 )  Alb: 3.7 g/dL / Pro: 7.2 g/dL / ALK PHOS: 66 U/L / ALT: 34 U/L DA / AST: 19 U/L / GGT: x           CARDIAC MARKERS ( 07 Feb 2021 07:02 )  0.277 ng/mL / x     / x     / x     / x      CARDIAC MARKERS ( 06 Feb 2021 12:57 )  0.343 ng/mL / x     / x     / x     / x      CARDIAC MARKERS ( 06 Feb 2021 08:10 )  0.327 ng/mL / x     / x     / x     / x      CARDIAC MARKERS ( 05 Feb 2021 22:01 )  0.393 ng/mL / x     / x     / x     / x          PT/INR - ( 06 Feb 2021 08:10 )   PT: 13.7 sec;   INR: 1.16 ratio         PTT - ( 06 Feb 2021 08:10 )  PTT:31.6 sec    I&O's Summary    06 Feb 2021 07:01  -  07 Feb 2021 07:00  --------------------------------------------------------  IN: 460 mL / OUT: 0 mL / NET: 460 mL            CAPILLARY BLOOD GLUCOSE      RADIOLOGY & ADDITIONAL TESTS:                   PGY-1 Progress Note discussed with attending    PAGER #: [[phone]] TILL 5:00 PM  PLEASE CONTACT ON CALL TEAM:  - On Call Team (Please refer to Quintin) FROM 5:00 PM - 8:30PM  - Nightfloat Team FROM 8:30 -7:30 AM    CHIEF COMPLAINT & BRIEF HOSPITAL COURSE:      INTERVAL HPI/OVERNIGHT EVENTS: patient refers he is feeling fine, no distress, on RA sat 99%, no chest pain, leg swelling improved         MEDICATIONS  (STANDING):  aspirin enteric coated 81 milliGRAM(s) Oral daily  atorvastatin 80 milliGRAM(s) Oral at bedtime  carvedilol 3.125 milliGRAM(s) Oral every 12 hours  enoxaparin Injectable 40 milliGRAM(s) SubCutaneous daily  furosemide    Tablet 40 milliGRAM(s) Oral two times a day  insulin lispro (ADMELOG) corrective regimen sliding scale   SubCutaneous Before meals and at bedtime  lisinopril 2.5 milliGRAM(s) Oral daily    MEDICATIONS  (PRN):  acetaminophen   Tablet .. 650 milliGRAM(s) Oral every 6 hours PRN Temp greater or equal to 38C (100.4F), Mild Pain (1 - 3)  ALBUTerol    90 MICROgram(s) HFA Inhaler 2 Puff(s) Inhalation every 6 hours PRN Shortness of Breath  ALPRAZolam 0.5 milliGRAM(s) Oral two times a day PRN anxiety/ panic attack      Vital Signs Last 24 Hrs  T(C): 36.3 (07 Feb 2021 08:32), Max: 37.3 (06 Feb 2021 19:16)  T(F): 97.3 (07 Feb 2021 08:32), Max: 99.1 (06 Feb 2021 19:16)  HR: 79 (07 Feb 2021 08:32) (78 - 91)  BP: 94/63 (07 Feb 2021 08:32) (94/63 - 105/69)  BP(mean): --  RR: 17 (07 Feb 2021 08:32) (17 - 18)  SpO2: 99% (07 Feb 2021 08:32) (96% - 99%)    PHYSICAL EXAMINATION:  GENERAL: NAD, overweight, on RA sat 99%  HEAD:  Atraumatic, Normocephalic  EYES:  conjunctiva and sclera clear  NECK: Supple, No JVD, Normal thyroid  CHEST/LUNG: Clear to auscultation. Clear to percussion bilaterally; No rales, rhonchi, wheezing, or rubs  HEART: Regular rate and rhythm; No murmurs, rubs, or gallops  ABDOMEN: Soft, Nontender, Nondistended; Bowel sounds present, no pain or masses on palpation  NERVOUS SYSTEM:  Alert & Oriented X3  : voiding well  EXTREMITIES:  2+ Peripheral Pulses, No clubbing, cyanosis, or edema  SKIN: warm dry                          13.4   4.90  )-----------( 218      ( 07 Feb 2021 07:02 )             39.6     02-07    138  |  105  |  17  ----------------------------<  140<H>  3.3<L>   |  26  |  0.77    Ca    8.8      07 Feb 2021 07:02  Phos  4.1     02-07  Mg     2.1     02-07    TPro  7.2  /  Alb  3.7  /  TBili  0.7  /  DBili  x   /  AST  19  /  ALT  34  /  AlkPhos  66  02-05    LIVER FUNCTIONS - ( 05 Feb 2021 22:01 )  Alb: 3.7 g/dL / Pro: 7.2 g/dL / ALK PHOS: 66 U/L / ALT: 34 U/L DA / AST: 19 U/L / GGT: x           CARDIAC MARKERS ( 07 Feb 2021 07:02 )  0.277 ng/mL / x     / x     / x     / x      CARDIAC MARKERS ( 06 Feb 2021 12:57 )  0.343 ng/mL / x     / x     / x     / x      CARDIAC MARKERS ( 06 Feb 2021 08:10 )  0.327 ng/mL / x     / x     / x     / x      CARDIAC MARKERS ( 05 Feb 2021 22:01 )  0.393 ng/mL / x     / x     / x     / x          PT/INR - ( 06 Feb 2021 08:10 )   PT: 13.7 sec;   INR: 1.16 ratio         PTT - ( 06 Feb 2021 08:10 )  PTT:31.6 sec    I&O's Summary    06 Feb 2021 07:01  -  07 Feb 2021 07:00  --------------------------------------------------------  IN: 460 mL / OUT: 0 mL / NET: 460 mL            CAPILLARY BLOOD GLUCOSE      RADIOLOGY & ADDITIONAL TESTS:                   PGY-1 Progress Note discussed with attending    PAGER #: [[phone]] TILL 5:00 PM  PLEASE CONTACT ON CALL TEAM:  - On Call Team (Please refer to Quintin) FROM 5:00 PM - 8:30PM  - Nightfloat Team FROM 8:30 -7:30 AM    CHIEF COMPLAINT & BRIEF HOSPITAL COURSE:      INTERVAL HPI/OVERNIGHT EVENTS: patient refers he is feeling fine, no distress, on RA sat 99%, no chest pain, leg swelling improved         MEDICATIONS  (STANDING):  aspirin enteric coated 81 milliGRAM(s) Oral daily  atorvastatin 80 milliGRAM(s) Oral at bedtime  carvedilol 3.125 milliGRAM(s) Oral every 12 hours  enoxaparin Injectable 40 milliGRAM(s) SubCutaneous daily  furosemide    Tablet 40 milliGRAM(s) Oral two times a day  insulin lispro (ADMELOG) corrective regimen sliding scale   SubCutaneous Before meals and at bedtime  lisinopril 2.5 milliGRAM(s) Oral daily    MEDICATIONS  (PRN):  acetaminophen   Tablet .. 650 milliGRAM(s) Oral every 6 hours PRN Temp greater or equal to 38C (100.4F), Mild Pain (1 - 3)  ALBUTerol    90 MICROgram(s) HFA Inhaler 2 Puff(s) Inhalation every 6 hours PRN Shortness of Breath  ALPRAZolam 0.5 milliGRAM(s) Oral two times a day PRN anxiety/ panic attack      Vital Signs Last 24 Hrs  T(C): 36.3 (07 Feb 2021 08:32), Max: 37.3 (06 Feb 2021 19:16)  T(F): 97.3 (07 Feb 2021 08:32), Max: 99.1 (06 Feb 2021 19:16)  HR: 79 (07 Feb 2021 08:32) (78 - 91)  BP: 94/63 (07 Feb 2021 08:32) (94/63 - 105/69)  BP(mean): --  RR: 17 (07 Feb 2021 08:32) (17 - 18)  SpO2: 99% (07 Feb 2021 08:32) (96% - 99%)    PHYSICAL EXAMINATION:  GENERAL: NAD, overweight, on RA sat 99%  HEAD:  Atraumatic, Normocephalic  EYES:  conjunctiva and sclera clear  NECK: Supple, No JVD, Normal thyroid  CHEST/LUNG: Clear to auscultation. Clear to percussion bilaterally; No rales, rhonchi, wheezing, or rubs  HEART: Regular rate and rhythm; No murmurs, rubs, or gallops  ABDOMEN: Soft, Nontender, Nondistended; Bowel sounds present, no pain or masses on palpation  NERVOUS SYSTEM:  Alert & Oriented X3  : voiding well  EXTREMITIES:  2+ Peripheral Pulses, No clubbing, or cyanosis. Improved lower extremity edema  SKIN: warm dry                          13.4   4.90  )-----------( 218      ( 07 Feb 2021 07:02 )             39.6     02-07    138  |  105  |  17  ----------------------------<  140<H>  3.3<L>   |  26  |  0.77    Ca    8.8      07 Feb 2021 07:02  Phos  4.1     02-07  Mg     2.1     02-07    TPro  7.2  /  Alb  3.7  /  TBili  0.7  /  DBili  x   /  AST  19  /  ALT  34  /  AlkPhos  66  02-05    LIVER FUNCTIONS - ( 05 Feb 2021 22:01 )  Alb: 3.7 g/dL / Pro: 7.2 g/dL / ALK PHOS: 66 U/L / ALT: 34 U/L DA / AST: 19 U/L / GGT: x           CARDIAC MARKERS ( 07 Feb 2021 07:02 )  0.277 ng/mL / x     / x     / x     / x      CARDIAC MARKERS ( 06 Feb 2021 12:57 )  0.343 ng/mL / x     / x     / x     / x      CARDIAC MARKERS ( 06 Feb 2021 08:10 )  0.327 ng/mL / x     / x     / x     / x      CARDIAC MARKERS ( 05 Feb 2021 22:01 )  0.393 ng/mL / x     / x     / x     / x          PT/INR - ( 06 Feb 2021 08:10 )   PT: 13.7 sec;   INR: 1.16 ratio         PTT - ( 06 Feb 2021 08:10 )  PTT:31.6 sec    I&O's Summary    06 Feb 2021 07:01  -  07 Feb 2021 07:00  --------------------------------------------------------  IN: 460 mL / OUT: 0 mL / NET: 460 mL                   PGY-1 Progress Note discussed with attending    PAGER #: [[phone]] TILL 5:00 PM  PLEASE CONTACT ON CALL TEAM:  - On Call Team (Please refer to Quintin) FROM 5:00 PM - 8:30PM  - Nightfloat Team FROM 8:30 -7:30 AM    CHIEF COMPLAINT & BRIEF HOSPITAL COURSE:  52 y/o M with PMHx of CVA 10 years ago (no residual deficits), HTN, not on any medications, diabetes not on any medications, anxiety presented to the ED c/o 1 month of worsening shortness of breath, dyspnea on exertion, chest pain on exertion, orthopnea, PND, dry cough and b/l leg swelling that increased over the last week. Patient admitted for acute heart failure with reduced EF, pro BNP 1705, CXR b/l pleural effusions, s/p IV Lasix, transitioned to PO Lasix, electrolytes replaced as needed, lipid profile abnormal high, on statin  Patient w/ elevated troponin 2/2 demand ischemia, EKG with Q waves V1-V3  Patients A1c 7.8, not on meds at home, upon d/c home Metformin 500mg BID  For COVID19 infection, no remdesivir or decadron indicated, isolation precautions  For hypertension, on Lisinopril     INTERVAL HPI/OVERNIGHT EVENTS: patient refers he is feeling fine, no distress, on RA sat 99%, no chest pain, leg swelling improved         MEDICATIONS  (STANDING):  aspirin enteric coated 81 milliGRAM(s) Oral daily  atorvastatin 80 milliGRAM(s) Oral at bedtime  carvedilol 3.125 milliGRAM(s) Oral every 12 hours  enoxaparin Injectable 40 milliGRAM(s) SubCutaneous daily  furosemide    Tablet 40 milliGRAM(s) Oral two times a day  insulin lispro (ADMELOG) corrective regimen sliding scale   SubCutaneous Before meals and at bedtime  lisinopril 2.5 milliGRAM(s) Oral daily    MEDICATIONS  (PRN):  acetaminophen   Tablet .. 650 milliGRAM(s) Oral every 6 hours PRN Temp greater or equal to 38C (100.4F), Mild Pain (1 - 3)  ALBUTerol    90 MICROgram(s) HFA Inhaler 2 Puff(s) Inhalation every 6 hours PRN Shortness of Breath  ALPRAZolam 0.5 milliGRAM(s) Oral two times a day PRN anxiety/ panic attack      Vital Signs Last 24 Hrs  T(C): 36.3 (07 Feb 2021 08:32), Max: 37.3 (06 Feb 2021 19:16)  T(F): 97.3 (07 Feb 2021 08:32), Max: 99.1 (06 Feb 2021 19:16)  HR: 79 (07 Feb 2021 08:32) (78 - 91)  BP: 94/63 (07 Feb 2021 08:32) (94/63 - 105/69)  BP(mean): --  RR: 17 (07 Feb 2021 08:32) (17 - 18)  SpO2: 99% (07 Feb 2021 08:32) (96% - 99%)    PHYSICAL EXAMINATION:  GENERAL: NAD, overweight, on RA sat 99%  HEAD:  Atraumatic, Normocephalic  EYES:  conjunctiva and sclera clear  NECK: Supple, No JVD, Normal thyroid  CHEST/LUNG: Clear to auscultation. Clear to percussion bilaterally; No rales, rhonchi, wheezing, or rubs  HEART: Regular rate and rhythm; No murmurs, rubs, or gallops  ABDOMEN: Soft, Nontender, Nondistended; Bowel sounds present, no pain or masses on palpation  NERVOUS SYSTEM:  Alert & Oriented X3  : voiding well  EXTREMITIES:  2+ Peripheral Pulses, No clubbing, or cyanosis. Improved lower extremity edema  SKIN: warm dry                          13.4   4.90  )-----------( 218      ( 07 Feb 2021 07:02 )             39.6     02-07    138  |  105  |  17  ----------------------------<  140<H>  3.3<L>   |  26  |  0.77    Ca    8.8      07 Feb 2021 07:02  Phos  4.1     02-07  Mg     2.1     02-07    TPro  7.2  /  Alb  3.7  /  TBili  0.7  /  DBili  x   /  AST  19  /  ALT  34  /  AlkPhos  66  02-05    LIVER FUNCTIONS - ( 05 Feb 2021 22:01 )  Alb: 3.7 g/dL / Pro: 7.2 g/dL / ALK PHOS: 66 U/L / ALT: 34 U/L DA / AST: 19 U/L / GGT: x           CARDIAC MARKERS ( 07 Feb 2021 07:02 )  0.277 ng/mL / x     / x     / x     / x      CARDIAC MARKERS ( 06 Feb 2021 12:57 )  0.343 ng/mL / x     / x     / x     / x      CARDIAC MARKERS ( 06 Feb 2021 08:10 )  0.327 ng/mL / x     / x     / x     / x      CARDIAC MARKERS ( 05 Feb 2021 22:01 )  0.393 ng/mL / x     / x     / x     / x          PT/INR - ( 06 Feb 2021 08:10 )   PT: 13.7 sec;   INR: 1.16 ratio         PTT - ( 06 Feb 2021 08:10 )  PTT:31.6 sec    I&O's Summary    06 Feb 2021 07:01  -  07 Feb 2021 07:00  --------------------------------------------------------  IN: 460 mL / OUT: 0 mL / NET: 460 mL

## 2021-02-07 NOTE — PROGRESS NOTE ADULT - PROBLEM SELECTOR PLAN 3
p/w SOB, cough, pnd, orthopnea  - WBC: WNL, lymphocyte; WNl   - CXR as above   - contact and airborne isolation precaution   - fu LDH, Procalcitonin, ferritin   - Tylenol, Albuterol Inhaler PRN -p/w SOB, cough, pnd, orthopnea  -WBC: WNL, lymphocyte; WNl   -CXR as above   -Isolation precaution   -F/u LDH, Procalcitonin, Ferritin   -Tylenol, Albuterol Inhaler PRN -P/w SOB, cough, PND, orthopnea  -CXR as above   -Isolation precautions  -Procalcitonin 0.07, Ferritin 1579  -Tylenol, Albuterol Inhaler PRN

## 2021-02-07 NOTE — PROGRESS NOTE ADULT - PROBLEM SELECTOR PLAN 2
-A1C 7.8  -Not on meds at home   -C/w HSS  -Target -180  -Monitor FS before meals and at bedtime -A1C 7.8  -Not on meds at home   -C/w HSS  -Target -180  -Monitor FS before meals and at bedtime  -Upon discharge Metformin 500 mg BID -Trop 0.393>0.327>0.343>0.277  -EKG with Q waves V1-V3, no ST changes   -F/u TTE  -Cardio Dr Fairchild

## 2021-02-07 NOTE — PROGRESS NOTE ADULT - SUBJECTIVE AND OBJECTIVE BOX
Colleen Arcos MD  Division of Hospital Medicine  Pager: [phone] (NS)/[phone] ([facility])    24 HOUR EVENTS/ROS:    MEDICATIONS:  aspirin enteric coated 81 milliGRAM(s) Oral daily  carvedilol 3.125 milliGRAM(s) Oral every 12 hours  enoxaparin Injectable 40 milliGRAM(s) SubCutaneous daily  furosemide   Injectable 40 milliGRAM(s) IV Push two times a day  lisinopril 2.5 milliGRAM(s) Oral daily      ALBUTerol    90 MICROgram(s) HFA Inhaler 2 Puff(s) Inhalation every 6 hours PRN    acetaminophen   Tablet .. 650 milliGRAM(s) Oral every 6 hours PRN  ALPRAZolam 0.5 milliGRAM(s) Oral two times a day PRN      atorvastatin 80 milliGRAM(s) Oral at bedtime  insulin lispro (ADMELOG) corrective regimen sliding scale   SubCutaneous Before meals and at bedtime    potassium chloride   Powder 40 milliEquivalent(s) Oral once      PHYSICAL EXAM:  T(C): 36.3 (21 @ 08:32), Max: 37.3 (21 @ 19:16)  HR: 79 (21 @ 08:32) (78 - 91)  BP: 94/63 (21 @ 08:32) (94/63 - 105/69)  RR: 17 (21 @ 08:32) (17 - 18)  SpO2: 99% (21 @ 08:32) (96% - 99%)  Wt(kg): --  Daily     Daily Weight in k.3 (2021 04:38)  I&O's Summary    2021 07:01  -  2021 07:00  --------------------------------------------------------  IN: 460 mL / OUT: 0 mL / NET: 460 mL      TELEMETRY:     Appearance: NAD	  HEENT:   Normal oral mucosa, PERRL, EOMI	  Lymphatic: No lymphadenopathy  Cardiovascular: Normal S1 S2, No JVD, No murmurs, No edema  Respiratory: Lungs clear to auscultation	  Neuro/psych: Grossly non-focal, CN 2-12 intact, AAOX3, mood and affect normal  Gastrointestinal:  Soft, Non-tender, + BS	  Skin: No rashes, No ecchymoses, No cyanosis	  Extremities: Normal range of motion, No clubbing, cyanosis or edema  Vascular: Peripheral pulses palpable 2+ bilaterally    LABS:	 	                        13.4   4.90  )-----------( 218      ( 2021 07:02 )             39.6         138  |  105  |  17  ----------------------------<  140<H>  3.3<L>   |  26  |  0.77  -    140  |  103  |  12  ----------------------------<  109<H>  3.2<L>   |  29  |  0.79    Ca    8.8      2021 07:02  Ca    8.7      2021 08:10  Phos  4.1     -  Phos  4.2     -06  Mg     2.1     -  Mg     2.0     02-06    TPro  7.2  /  Alb  3.7  /  TBili  0.7  /  DBili  x   /  AST  19  /  ALT  34  /  AlkPhos  66  -    proBNP: Serum Pro-Brain Natriuretic Peptide: 1705 pg/mL ( @ 22:01)    Lipid Profile:   HgA1c:   TSH:   FS: CAPILLARY BLOOD GLUCOSE      POCT Blood Glucose.: 185 mg/dL (2021 07:40)  POCT Blood Glucose.: 167 mg/dL (2021 20:56)  POCT Blood Glucose.: 159 mg/dL (2021 16:40)  POCT Blood Glucose.: 181 mg/dL (2021 11:35)    BCX/UCX:   Coags: PT/INR - ( 2021 08:10 )   PT: 13.7 sec;   INR: 1.16 ratio         PTT - ( 2021 08:10 )  PTT:31.6 sec    CARDIAC MARKERS:  Troponin I, Serum: 0.277 ng/mL ( @ 07:02)  Troponin I, Serum: 0.343 ng/mL ( @ 12:57)  Troponin I, Serum: 0.327 ng/mL ( @ 08:10)  Troponin I, Serum: 0.393 ng/mL ( @ 22:01)            	    ECG:  	  RADIOLOGY:    Consult notes reviewed:  Care discussed with providers:

## 2021-02-07 NOTE — PROGRESS NOTE ADULT - PROBLEM SELECTOR PLAN 5
continue with home medications -Continue with home medications -C/w lisinopril   -Monitor BP and adjust as needed

## 2021-02-08 ENCOUNTER — TRANSCRIPTION ENCOUNTER (OUTPATIENT)
Age: 51
End: 2021-02-08

## 2021-02-08 LAB
ANION GAP SERPL CALC-SCNC: 7 MMOL/L — SIGNIFICANT CHANGE UP (ref 5–17)
BUN SERPL-MCNC: 15 MG/DL — SIGNIFICANT CHANGE UP (ref 7–18)
CALCIUM SERPL-MCNC: 8.8 MG/DL — SIGNIFICANT CHANGE UP (ref 8.4–10.5)
CHLORIDE SERPL-SCNC: 104 MMOL/L — SIGNIFICANT CHANGE UP (ref 96–108)
CO2 SERPL-SCNC: 28 MMOL/L — SIGNIFICANT CHANGE UP (ref 22–31)
CREAT SERPL-MCNC: 0.86 MG/DL — SIGNIFICANT CHANGE UP (ref 0.5–1.3)
GLUCOSE BLDC GLUCOMTR-MCNC: 138 MG/DL — HIGH (ref 70–99)
GLUCOSE BLDC GLUCOMTR-MCNC: 148 MG/DL — HIGH (ref 70–99)
GLUCOSE BLDC GLUCOMTR-MCNC: 150 MG/DL — HIGH (ref 70–99)
GLUCOSE BLDC GLUCOMTR-MCNC: 168 MG/DL — HIGH (ref 70–99)
GLUCOSE SERPL-MCNC: 161 MG/DL — HIGH (ref 70–99)
HCT VFR BLD CALC: 41.1 % — SIGNIFICANT CHANGE UP (ref 39–50)
HGB BLD-MCNC: 13.8 G/DL — SIGNIFICANT CHANGE UP (ref 13–17)
MAGNESIUM SERPL-MCNC: 1.9 MG/DL — SIGNIFICANT CHANGE UP (ref 1.6–2.6)
MCHC RBC-ENTMCNC: 30.1 PG — SIGNIFICANT CHANGE UP (ref 27–34)
MCHC RBC-ENTMCNC: 33.6 GM/DL — SIGNIFICANT CHANGE UP (ref 32–36)
MCV RBC AUTO: 89.5 FL — SIGNIFICANT CHANGE UP (ref 80–100)
NRBC # BLD: 0 /100 WBCS — SIGNIFICANT CHANGE UP (ref 0–0)
PHOSPHATE SERPL-MCNC: 4.1 MG/DL — SIGNIFICANT CHANGE UP (ref 2.5–4.5)
PLATELET # BLD AUTO: 209 K/UL — SIGNIFICANT CHANGE UP (ref 150–400)
POTASSIUM SERPL-MCNC: 3.7 MMOL/L — SIGNIFICANT CHANGE UP (ref 3.5–5.3)
POTASSIUM SERPL-SCNC: 3.7 MMOL/L — SIGNIFICANT CHANGE UP (ref 3.5–5.3)
RBC # BLD: 4.59 M/UL — SIGNIFICANT CHANGE UP (ref 4.2–5.8)
RBC # FLD: 12.8 % — SIGNIFICANT CHANGE UP (ref 10.3–14.5)
SARS-COV-2 IGG SERPL QL IA: POSITIVE
SARS-COV-2 IGM SERPL IA-ACNC: 9.83 INDEX — HIGH
SODIUM SERPL-SCNC: 139 MMOL/L — SIGNIFICANT CHANGE UP (ref 135–145)
WBC # BLD: 4.91 K/UL — SIGNIFICANT CHANGE UP (ref 3.8–10.5)
WBC # FLD AUTO: 4.91 K/UL — SIGNIFICANT CHANGE UP (ref 3.8–10.5)

## 2021-02-08 PROCEDURE — 99233 SBSQ HOSP IP/OBS HIGH 50: CPT | Mod: GC

## 2021-02-08 RX ADMIN — CARVEDILOL PHOSPHATE 3.12 MILLIGRAM(S): 80 CAPSULE, EXTENDED RELEASE ORAL at 05:38

## 2021-02-08 RX ADMIN — CARVEDILOL PHOSPHATE 3.12 MILLIGRAM(S): 80 CAPSULE, EXTENDED RELEASE ORAL at 18:15

## 2021-02-08 RX ADMIN — Medication 0.5 MILLIGRAM(S): at 18:51

## 2021-02-08 RX ADMIN — Medication 40 MILLIGRAM(S): at 05:38

## 2021-02-08 RX ADMIN — LISINOPRIL 2.5 MILLIGRAM(S): 2.5 TABLET ORAL at 05:37

## 2021-02-08 RX ADMIN — Medication 81 MILLIGRAM(S): at 11:33

## 2021-02-08 RX ADMIN — Medication 3 MILLIGRAM(S): at 21:32

## 2021-02-08 NOTE — DISCHARGE NOTE PROVIDER - PROVIDER TOKENS
PROVIDER:[TOKEN:[[id number]],FOLLOWUP:[1 week]],PROVIDER:[TOKEN:[[id number]],FOLLOWUP:[1 week]]

## 2021-02-08 NOTE — PROGRESS NOTE ADULT - PROBLEM SELECTOR PLAN 4
-A1C 7.8  -Not on meds at home   -C/w HSS  -Target -180  -Monitor FS before meals and at bedtime  -Upon discharge Metformin 500 mg BID

## 2021-02-08 NOTE — PROGRESS NOTE ADULT - PROBLEM SELECTOR PLAN 1
-Pt presented with SOB, orthopnea, PND, JVD likely 2/2 Acute Decompensated Heart Failure  -Pro-BNP= 1705  -CXR showed bilateral lower lung fields blunting of the costophrenic angles  -Telemetry for continuous cardiac monitoring  -Daily Weights, Low Sodium Diet, Strict I&Os, Fluid Restrictions  -Lipid Panel abnormal, Hemoglobin A1c 7.8, TSH 1.15  -S/p Lasix 40mg IV BID  -Transitioned to Lasix 40 mg po BID   -C/w Aspirin, lisinopril and Statin  -K replaced x1 po  -F/u TTE  -Dr. Fairchild on board -Pt presented with SOB, orthopnea, PND, JVD likely 2/2 Acute Decompensated Heart Failure  -Pro-BNP= 1705  -CXR showed bilateral lower lung fields blunting of the costophrenic angles  -Telemetry for continuous cardiac monitoring  -Daily Weights, Low Sodium Diet, Strict I&Os, Fluid Restrictions  -Lipid Panel abnormal, Hemoglobin A1c 7.8, TSH 1.15  -S/p Lasix 40mg IV BID  -C/w Lasix 40 mg po BID   -C/w Aspirin, lisinopril and Statin  -K replaced x1 po  -F/u TTE   -Dr. Fairchild on board

## 2021-02-08 NOTE — PROGRESS NOTE ADULT - PROBLEM SELECTOR PLAN 3
-P/w SOB, cough, PND, orthopnea  -CXR as above   -Isolation precautions  -Procalcitonin 0.07, Ferritin 1579  -Tylenol, Albuterol Inhaler PRN

## 2021-02-08 NOTE — DISCHARGE NOTE PROVIDER - HOSPITAL COURSE
50 y/o M with PMHx of CVA 10 years ago (no residual deficits), HTN, not on any medications, diabetes not on any medications, anxiety presented to the ED c/o 1 month of worsening shortness of breath, dyspnea on exertion, chest pain on exertion, orthopnea, PND, dry cough and b/l leg swelling that increased over the last week. Patient admitted for acute heart failure with reduced EF, pro BNP 1705, CXR b/l pleural effusions, s/p IV Lasix, transitioned to PO Lasix, electrolytes replaced as needed  Patient w/ elevated troponin 2/2 demand ischemia, EKG with Q waves V1-V3, known to have diabetes. A1c 7.8, not on meds at home, upon d/c home Metformin 500mg BID, lipid profile abnormal high, on statin for hyperlipidemia   For COVID19 infection, no Remdesivir or Decadron indicated, isolation precautions, not requiring oxygen supplementation, for hypertension, on Lisinopril, Carvedilol and Lasix  Vitamin B12 and folate normal, for anxiety on Xanax     Patient is stable for discharge and is advised to follow up with PCP as outpatient.  Please refer to patient's complete medical chart with documents for a full hospital course, for this is only a brief summary.   50 y/o M with PMHx of CVA 10 years ago (no residual deficits), HTN, not on any medications, diabetes not on any medications, anxiety presented to the ED c/o 1 month of worsening shortness of breath, dyspnea on exertion, chest pain on exertion, orthopnea, PND, dry cough and b/l leg swelling that increased over the last week. Patient admitted for acute heart failure with reduced EF, pro BNP 1705, CXR b/l pleural effusions, s/p IV Lasix, transitioned to PO Lasix, electrolytes replaced as needed  Patient w/ elevated troponin 2/2 demand ischemia, EKG with Q waves V1-V3, known to have diabetes, A1c 7.8, not taking any meds at home, upon d/c home Metformin 500mg BID, lipid profile abnormal high, on statin for hyperlipidemia, echo showed [id number]  For COVID19 infection, no Remdesivir or Decadron indicated, isolation precautions, not requiring oxygen supplementation, for hypertension, on Lisinopril, Carvedilol and Lasix  Vitamin B12 and folate normal, for anxiety on Xanax     Patient is stable for discharge and is advised to follow up with PCP as outpatient.  Please refer to patient's complete medical chart with documents for a full hospital course, for this is only a brief summary.   50 y/o M with PMHx of CVA 10 years ago (no residual deficits), HTN, not on any medications, diabetes not on any medications, anxiety presented to the ED c/o 1 month of worsening shortness of breath, dyspnea on exertion, chest pain on exertion, orthopnea, PND, dry cough and b/l leg swelling that increased over the last week. Patient admitted for acute heart failure with reduced EF, pro BNP 1705, CXR b/l pleural effusions, s/p IV Lasix, transitioned to PO Lasix, electrolytes replaced as needed  Patient w/ elevated troponin 2/2 demand ischemia, EKG with Q waves V1-V3, known to have diabetes, A1c 7.8, not taking any meds at home, upon d/c home Metformin 500mg BID, lipid profile abnormal high, on statin for hyperlipidemia, echo done pending results   For COVID19 infection, no Remdesivir or Decadron indicated, isolation precautions, not requiring oxygen supplementation, for hypertension, on Lisinopril, Carvedilol and Lasix  Vitamin B12 and folate normal, for anxiety on Xanax   Patient has appointment with Cardio Dr Fairchild on Tuesday 02/16/2021    Patient is stable for discharge and is advised to follow up with PCP as outpatient.  Please refer to patient's complete medical chart with documents for a full hospital course, for this is only a brief summary.

## 2021-02-08 NOTE — PROGRESS NOTE ADULT - SUBJECTIVE AND OBJECTIVE BOX
PGY-1 Progress Note discussed with attending    PAGER #: [[phone]] TILL 5:00 PM  PLEASE CONTACT ON CALL TEAM:  - On Call Team (Please refer to Quintin) FROM 5:00 PM - 8:30PM  - Nightfloat Team FROM 8:30 -7:30 AM    CHIEF COMPLAINT & BRIEF HOSPITAL COURSE:  50 y/o M with PMHx of CVA 10 years ago (no residual deficits), HTN, not on any medications, diabetes not on any medications, anxiety presented to the ED c/o 1 month of worsening shortness of breath, dyspnea on exertion, chest pain on exertion, orthopnea, PND, dry cough and b/l leg swelling that increased over the last week. Patient admitted for acute heart failure with reduced EF, pro BNP 1705, CXR b/l pleural effusions, s/p IV Lasix, transitioned to PO Lasix, electrolytes replaced as needed, lipid profile abnormal high, on statin  Patient w/ elevated troponin 2/2 demand ischemia, EKG with Q waves V1-V3  Patients A1c 7.8, not on meds at home, upon d/c home Metformin 500mg BID  For COVID19 infection, no remdesivir or decadron indicated, isolation precautions  For hypertension, on Lisinopril     INTERVAL HPI/OVERNIGHT EVENTS: Patient is resting comfortably in no acute distress. Speaking in complete sentences. O2 Sat, no distress, on RA sat 98%, no chest pain, no shortness of breath. Patient states that he slept through the night without any episodes of dyspnea. Patient's leg swelling improved.      MEDICATIONS  (STANDING):  aspirin enteric coated 81 milliGRAM(s) Oral daily  atorvastatin 80 milliGRAM(s) Oral at bedtime  carvedilol 3.125 milliGRAM(s) Oral every 12 hours  enoxaparin Injectable 40 milliGRAM(s) SubCutaneous daily  furosemide    Tablet 40 milliGRAM(s) Oral two times a day  insulin lispro (ADMELOG) corrective regimen sliding scale   SubCutaneous Before meals and at bedtime  lisinopril 2.5 milliGRAM(s) Oral daily    MEDICATIONS  (PRN):  acetaminophen   Tablet .. 650 milliGRAM(s) Oral every 6 hours PRN Temp greater or equal to 38C (100.4F), Mild Pain (1 - 3)  ALBUTerol    90 MICROgram(s) HFA Inhaler 2 Puff(s) Inhalation every 6 hours PRN Shortness of Breath  ALPRAZolam 0.5 milliGRAM(s) Oral two times a day PRN anxiety/ panic attack      Vital Signs Last 24 Hrs  T(C): 36.3 (07 Feb 2021 08:32), Max: 37.3 (06 Feb 2021 19:16)  T(F): 97.3 (07 Feb 2021 08:32), Max: 99.1 (06 Feb 2021 19:16)  HR: 79 (07 Feb 2021 08:32) (78 - 91)  BP: 94/63 (07 Feb 2021 08:32) (94/63 - 105/69)  BP(mean): --  RR: 17 (07 Feb 2021 08:32) (17 - 18)  SpO2: 99% (07 Feb 2021 08:32) (96% - 99%)    PHYSICAL EXAMINATION:  GENERAL: NAD, overweight, on RA sat 99%  HEAD:  Atraumatic, Normocephalic  EYES:  conjunctiva and sclera clear  NECK: Supple, No JVD, Normal thyroid  CHEST/LUNG: Clear to auscultation. Clear to percussion bilaterally; No rales, rhonchi, wheezing, or rubs  HEART: Regular rate and rhythm; No murmurs, rubs, or gallops  ABDOMEN: Soft, Nontender, Nondistended; Bowel sounds present, no pain or masses on palpation  NERVOUS SYSTEM:  Alert & Oriented X3  : voiding well  EXTREMITIES:  2+ Peripheral Pulses, No clubbing, or cyanosis. Improved lower extremity edema  SKIN: warm dry                          13.4   4.90  )-----------( 218      ( 07 Feb 2021 07:02 )             39.6     02-07    138  |  105  |  17  ----------------------------<  140<H>  3.3<L>   |  26  |  0.77    Ca    8.8      07 Feb 2021 07:02  Phos  4.1     02-07  Mg     2.1     02-07    TPro  7.2  /  Alb  3.7  /  TBili  0.7  /  DBili  x   /  AST  19  /  ALT  34  /  AlkPhos  66  02-05    LIVER FUNCTIONS - ( 05 Feb 2021 22:01 )  Alb: 3.7 g/dL / Pro: 7.2 g/dL / ALK PHOS: 66 U/L / ALT: 34 U/L DA / AST: 19 U/L / GGT: x           CARDIAC MARKERS ( 07 Feb 2021 07:02 )  0.277 ng/mL / x     / x     / x     / x      CARDIAC MARKERS ( 06 Feb 2021 12:57 )  0.343 ng/mL / x     / x     / x     / x      CARDIAC MARKERS ( 06 Feb 2021 08:10 )  0.327 ng/mL / x     / x     / x     / x      CARDIAC MARKERS ( 05 Feb 2021 22:01 )  0.393 ng/mL / x     / x     / x     / x          PT/INR - ( 06 Feb 2021 08:10 )   PT: 13.7 sec;   INR: 1.16 ratio         PTT - ( 06 Feb 2021 08:10 )  PTT:31.6 sec    I&O's Summary    06 Feb 2021 07:01  -  07 Feb 2021 07:00  --------------------------------------------------------  IN: 460 mL / OUT: 0 mL / NET: 460 mL                   PGY-1 Progress Note discussed with attending    PAGER #: [[phone]] TILL 5:00 PM  PLEASE CONTACT ON CALL TEAM:  - On Call Team (Please refer to Quintin) FROM 5:00 PM - 8:30PM  - Nightfloat Team FROM 8:30 -7:30 AM    CHIEF COMPLAINT & BRIEF HOSPITAL COURSE:  52 y/o M with PMHx of CVA 10 years ago (no residual deficits), HTN, not on any medications, diabetes not on any medications, anxiety presented to the ED c/o 1 month of worsening shortness of breath, dyspnea on exertion, chest pain on exertion, orthopnea, PND, dry cough and b/l leg swelling that increased over the last week. Patient admitted for acute heart failure with reduced EF, pro BNP 1705, CXR b/l pleural effusions, s/p IV Lasix, transitioned to PO Lasix, electrolytes replaced as needed, lipid profile abnormal high, on statin  Patient w/ elevated troponin 2/2 demand ischemia, EKG with Q waves V1-V3  Patients A1c 7.8, not on meds at home, upon d/c home Metformin 500mg BID  For COVID19 infection, no remdesivir or decadron indicated, isolation precautions  For hypertension, on Lisinopril     INTERVAL HPI/OVERNIGHT EVENTS: Patient is resting comfortably in no acute distress. Speaking in complete sentences. O2 Sat, no distress, on RA sat 98%, no chest pain, no shortness of breath. Patient states that he slept through the night without any episodes of dyspnea. Patient's leg swelling improved.      MEDICATIONS  (STANDING):  aspirin enteric coated 81 milliGRAM(s) Oral daily  atorvastatin 80 milliGRAM(s) Oral at bedtime  carvedilol 3.125 milliGRAM(s) Oral every 12 hours  enoxaparin Injectable 40 milliGRAM(s) SubCutaneous daily  furosemide    Tablet 40 milliGRAM(s) Oral two times a day  insulin lispro (ADMELOG) corrective regimen sliding scale   SubCutaneous Before meals and at bedtime  lisinopril 2.5 milliGRAM(s) Oral daily    MEDICATIONS  (PRN):  acetaminophen   Tablet .. 650 milliGRAM(s) Oral every 6 hours PRN Temp greater or equal to 38C (100.4F), Mild Pain (1 - 3)  ALBUTerol    90 MICROgram(s) HFA Inhaler 2 Puff(s) Inhalation every 6 hours PRN Shortness of Breath  ALPRAZolam 0.5 milliGRAM(s) Oral two times a day PRN anxiety/ panic attack      Vital Signs Last 24 Hrs  T(C): 36.3 (07 Feb 2021 08:32), Max: 37.3 (06 Feb 2021 19:16)  T(F): 97.3 (07 Feb 2021 08:32), Max: 99.1 (06 Feb 2021 19:16)  HR: 79 (07 Feb 2021 08:32) (78 - 91)  BP: 94/63 (07 Feb 2021 08:32) (94/63 - 105/69)  BP(mean): --  RR: 17 (07 Feb 2021 08:32) (17 - 18)  SpO2: 99% (07 Feb 2021 08:32) (96% - 99%)    PHYSICAL EXAMINATION:  GENERAL: NAD, overweight, on RA sat 99%  HEAD:  Atraumatic, Normocephalic  EYES:  conjunctiva and sclera clear  NECK: Supple, No JVD, Normal thyroid  CHEST/LUNG: Clear to auscultation. Clear to percussion bilaterally; No rales, rhonchi, wheezing, or rubs  HEART: Regular rate and rhythm; No murmurs, rubs, or gallops  ABDOMEN: Soft, Nontender, Nondistended; Bowel sounds present, no pain or masses on palpation  NERVOUS SYSTEM:  Alert & Oriented X3  : voiding well  EXTREMITIES:  2+ Peripheral Pulses, No clubbing, or cyanosis. No lower extremity edema  SKIN: warm dry                          13.4   4.90  )-----------( 218      ( 07 Feb 2021 07:02 )             39.6     02-07    138  |  105  |  17  ----------------------------<  140<H>  3.3<L>   |  26  |  0.77    Ca    8.8      07 Feb 2021 07:02  Phos  4.1     02-07  Mg     2.1     02-07    TPro  7.2  /  Alb  3.7  /  TBili  0.7  /  DBili  x   /  AST  19  /  ALT  34  /  AlkPhos  66  02-05    LIVER FUNCTIONS - ( 05 Feb 2021 22:01 )  Alb: 3.7 g/dL / Pro: 7.2 g/dL / ALK PHOS: 66 U/L / ALT: 34 U/L DA / AST: 19 U/L / GGT: x           CARDIAC MARKERS ( 07 Feb 2021 07:02 )  0.277 ng/mL / x     / x     / x     / x      CARDIAC MARKERS ( 06 Feb 2021 12:57 )  0.343 ng/mL / x     / x     / x     / x      CARDIAC MARKERS ( 06 Feb 2021 08:10 )  0.327 ng/mL / x     / x     / x     / x      CARDIAC MARKERS ( 05 Feb 2021 22:01 )  0.393 ng/mL / x     / x     / x     / x          PT/INR - ( 06 Feb 2021 08:10 )   PT: 13.7 sec;   INR: 1.16 ratio         PTT - ( 06 Feb 2021 08:10 )  PTT:31.6 sec    I&O's Summary    06 Feb 2021 07:01  -  07 Feb 2021 07:00  --------------------------------------------------------  IN: 460 mL / OUT: 0 mL / NET: 460 mL

## 2021-02-08 NOTE — PROGRESS NOTE ADULT - PROBLEM SELECTOR PLAN 8
IMPROVE VTE Individual Risk Assessment  RISK                                                         Points  [  ] Previous VTE                                      3  [  ] Thrombophilia                                   2  [  ] Lower limb paralysis                         2 (unable to hold up >15 seconds)    [  ] Current Cancer                                  2       (within 6 months)  [  ] Immobilization > 24 hrs                    1  [  ] ICU/CCU stay > 24 hrs                         1  [  ] Age > 60                                              1  C/w Lovenox for DVT ppx
IMPROVE VTE Individual Risk Assessment  RISK                                                         Points  [  ] Previous VTE                                      3  [  ] Thrombophilia                                   2  [  ] Lower limb paralysis                         2 (unable to hold up >15 seconds)    [  ] Current Cancer                                  2       (within 6 months)  [  ] Immobilization > 24 hrs                    1  [  ] ICU/CCU stay > 24 hrs                         1  [  ] Age > 60                                              1  C/w Lovenox for DVT ppx

## 2021-02-08 NOTE — DISCHARGE NOTE PROVIDER - NSDCMRMEDTOKEN_GEN_ALL_CORE_FT
Levemir FlexPen 100 units/mL subcutaneous solution:   lisinopril 10 mg oral tablet: 1 tab(s) orally once a day  NovoLOG FlexPen 100 units/mL injectable solution:   Vitamin D3 50,000 intl units (1250 mcg) oral capsule:   Xanax 0.5 mg oral tablet: 1 tab(s) orally 2 times a day, As Needed   albuterol 90 mcg/inh inhalation aerosol: 2 puff(s) inhaled every 6 hours, As needed, Shortness of Breath  aspirin 81 mg oral delayed release tablet: 1 tab(s) orally once a day  atorvastatin 80 mg oral tablet: 1 tab(s) orally once a day (at bedtime)  carvedilol 3.125 mg oral tablet: 1 tab(s) orally every 12 hours  Lasix 20 mg oral tablet: 1 tab(s) orally once a day  lisinopril 2.5 mg oral tablet: 1 tab(s) orally once a day  metFORMIN 500 mg oral tablet: 1 tab(s) orally 2 times a day   Xanax 0.5 mg oral tablet: 1 tab(s) orally 2 times a day, As Needed

## 2021-02-08 NOTE — PROGRESS NOTE ADULT - PROBLEM SELECTOR PLAN 2
-Trop 0.393>0.327>0.343>0.277  -EKG with Q waves V1-V3, no ST changes   -F/u TTE  -Cardio Dr Fairchild

## 2021-02-08 NOTE — DISCHARGE NOTE PROVIDER - CARE PROVIDER_API CALL
ALLEN MINOR  [facility]  [address] [place] [address]  Phone: [phone]  Fax: [phone]  Follow Up Time: 1 week    Marga Burch)  Endocrinology[facility]abetes  [address] [place] [address]  Phone: [phone]  Fax: [phone]  Follow Up Time: 1 week

## 2021-02-09 ENCOUNTER — TRANSCRIPTION ENCOUNTER (OUTPATIENT)
Age: 51
End: 2021-02-09

## 2021-02-09 VITALS
OXYGEN SATURATION: 99 % | SYSTOLIC BLOOD PRESSURE: 101 MMHG | DIASTOLIC BLOOD PRESSURE: 67 MMHG | RESPIRATION RATE: 17 BRPM | HEART RATE: 98 BPM | TEMPERATURE: 96 F

## 2021-02-09 DIAGNOSIS — E78.5 HYPERLIPIDEMIA, UNSPECIFIED: ICD-10-CM

## 2021-02-09 LAB
ANION GAP SERPL CALC-SCNC: 4 MMOL/L — LOW (ref 5–17)
BUN SERPL-MCNC: 13 MG/DL — SIGNIFICANT CHANGE UP (ref 7–18)
CALCIUM SERPL-MCNC: 9.1 MG/DL — SIGNIFICANT CHANGE UP (ref 8.4–10.5)
CHLORIDE SERPL-SCNC: 103 MMOL/L — SIGNIFICANT CHANGE UP (ref 96–108)
CO2 SERPL-SCNC: 33 MMOL/L — HIGH (ref 22–31)
CREAT SERPL-MCNC: 0.81 MG/DL — SIGNIFICANT CHANGE UP (ref 0.5–1.3)
CRP SERPL-MCNC: <0.1 MG/DL — SIGNIFICANT CHANGE UP (ref 0–0.4)
D DIMER BLD IA.RAPID-MCNC: 158 NG/ML DDU — SIGNIFICANT CHANGE UP
FERRITIN SERPL-MCNC: 1481 NG/ML — HIGH (ref 30–400)
GLUCOSE BLDC GLUCOMTR-MCNC: 157 MG/DL — HIGH (ref 70–99)
GLUCOSE BLDC GLUCOMTR-MCNC: 177 MG/DL — HIGH (ref 70–99)
GLUCOSE BLDC GLUCOMTR-MCNC: 180 MG/DL — HIGH (ref 70–99)
GLUCOSE SERPL-MCNC: 131 MG/DL — HIGH (ref 70–99)
HCT VFR BLD CALC: 40.2 % — SIGNIFICANT CHANGE UP (ref 39–50)
HGB BLD-MCNC: 13.6 G/DL — SIGNIFICANT CHANGE UP (ref 13–17)
LDH SERPL L TO P-CCNC: 148 U/L — SIGNIFICANT CHANGE UP (ref 120–225)
MAGNESIUM SERPL-MCNC: 2 MG/DL — SIGNIFICANT CHANGE UP (ref 1.6–2.6)
MCHC RBC-ENTMCNC: 30.3 PG — SIGNIFICANT CHANGE UP (ref 27–34)
MCHC RBC-ENTMCNC: 33.8 GM/DL — SIGNIFICANT CHANGE UP (ref 32–36)
MCV RBC AUTO: 89.5 FL — SIGNIFICANT CHANGE UP (ref 80–100)
NRBC # BLD: 0 /100 WBCS — SIGNIFICANT CHANGE UP (ref 0–0)
PHOSPHATE SERPL-MCNC: 4.2 MG/DL — SIGNIFICANT CHANGE UP (ref 2.5–4.5)
PLATELET # BLD AUTO: 193 K/UL — SIGNIFICANT CHANGE UP (ref 150–400)
POTASSIUM SERPL-MCNC: 3.8 MMOL/L — SIGNIFICANT CHANGE UP (ref 3.5–5.3)
POTASSIUM SERPL-SCNC: 3.8 MMOL/L — SIGNIFICANT CHANGE UP (ref 3.5–5.3)
RBC # BLD: 4.49 M/UL — SIGNIFICANT CHANGE UP (ref 4.2–5.8)
RBC # FLD: 12.9 % — SIGNIFICANT CHANGE UP (ref 10.3–14.5)
SODIUM SERPL-SCNC: 140 MMOL/L — SIGNIFICANT CHANGE UP (ref 135–145)
WBC # BLD: 4.53 K/UL — SIGNIFICANT CHANGE UP (ref 3.8–10.5)
WBC # FLD AUTO: 4.53 K/UL — SIGNIFICANT CHANGE UP (ref 3.8–10.5)

## 2021-02-09 PROCEDURE — 85610 PROTHROMBIN TIME: CPT

## 2021-02-09 PROCEDURE — 83036 HEMOGLOBIN GLYCOSYLATED A1C: CPT

## 2021-02-09 PROCEDURE — 96375 TX/PRO/DX INJ NEW DRUG ADDON: CPT

## 2021-02-09 PROCEDURE — 85027 COMPLETE CBC AUTOMATED: CPT

## 2021-02-09 PROCEDURE — 84100 ASSAY OF PHOSPHORUS: CPT

## 2021-02-09 PROCEDURE — 96374 THER/PROPH/DIAG INJ IV PUSH: CPT

## 2021-02-09 PROCEDURE — 36415 COLL VENOUS BLD VENIPUNCTURE: CPT

## 2021-02-09 PROCEDURE — 82607 VITAMIN B-12: CPT

## 2021-02-09 PROCEDURE — U0005: CPT

## 2021-02-09 PROCEDURE — 86140 C-REACTIVE PROTEIN: CPT

## 2021-02-09 PROCEDURE — 93005 ELECTROCARDIOGRAM TRACING: CPT

## 2021-02-09 PROCEDURE — 83735 ASSAY OF MAGNESIUM: CPT

## 2021-02-09 PROCEDURE — 84443 ASSAY THYROID STIM HORMONE: CPT

## 2021-02-09 PROCEDURE — 84145 PROCALCITONIN (PCT): CPT

## 2021-02-09 PROCEDURE — 86769 SARS-COV-2 COVID-19 ANTIBODY: CPT

## 2021-02-09 PROCEDURE — 85730 THROMBOPLASTIN TIME PARTIAL: CPT

## 2021-02-09 PROCEDURE — 83615 LACTATE (LD) (LDH) ENZYME: CPT

## 2021-02-09 PROCEDURE — 99285 EMERGENCY DEPT VISIT HI MDM: CPT | Mod: 25

## 2021-02-09 PROCEDURE — 93306 TTE W/DOPPLER COMPLETE: CPT

## 2021-02-09 PROCEDURE — 71046 X-RAY EXAM CHEST 2 VIEWS: CPT

## 2021-02-09 PROCEDURE — 83880 ASSAY OF NATRIURETIC PEPTIDE: CPT

## 2021-02-09 PROCEDURE — 82746 ASSAY OF FOLIC ACID SERUM: CPT

## 2021-02-09 PROCEDURE — 84484 ASSAY OF TROPONIN QUANT: CPT

## 2021-02-09 PROCEDURE — 80048 BASIC METABOLIC PNL TOTAL CA: CPT

## 2021-02-09 PROCEDURE — 85025 COMPLETE CBC W/AUTO DIFF WBC: CPT

## 2021-02-09 PROCEDURE — 82728 ASSAY OF FERRITIN: CPT

## 2021-02-09 PROCEDURE — 99239 HOSP IP/OBS DSCHRG MGMT >30: CPT | Mod: GC

## 2021-02-09 PROCEDURE — 80061 LIPID PANEL: CPT

## 2021-02-09 PROCEDURE — 82962 GLUCOSE BLOOD TEST: CPT

## 2021-02-09 PROCEDURE — 80053 COMPREHEN METABOLIC PANEL: CPT

## 2021-02-09 PROCEDURE — 87635 SARS-COV-2 COVID-19 AMP PRB: CPT

## 2021-02-09 PROCEDURE — 85379 FIBRIN DEGRADATION QUANT: CPT

## 2021-02-09 RX ORDER — METFORMIN HYDROCHLORIDE 850 MG/1
1 TABLET ORAL
Qty: 60 | Refills: 0
Start: 2021-02-09 | End: 2021-03-10

## 2021-02-09 RX ORDER — CHOLECALCIFEROL (VITAMIN D3) 125 MCG
0 CAPSULE ORAL
Qty: 0 | Refills: 0 | DISCHARGE

## 2021-02-09 RX ORDER — ALBUTEROL 90 UG/1
2 AEROSOL, METERED ORAL
Qty: 1 | Refills: 0
Start: 2021-02-09 | End: 2021-03-10

## 2021-02-09 RX ORDER — INSULIN DETEMIR 100/ML (3)
0 INSULIN PEN (ML) SUBCUTANEOUS
Qty: 0 | Refills: 0 | DISCHARGE

## 2021-02-09 RX ORDER — INSULIN ASPART 100 [IU]/ML
0 INJECTION, SOLUTION SUBCUTANEOUS
Qty: 0 | Refills: 0 | DISCHARGE

## 2021-02-09 RX ORDER — ATORVASTATIN CALCIUM 80 MG/1
1 TABLET, FILM COATED ORAL
Qty: 30 | Refills: 0
Start: 2021-02-09 | End: 2021-03-10

## 2021-02-09 RX ORDER — FUROSEMIDE 40 MG
40 TABLET ORAL DAILY
Refills: 0 | Status: DISCONTINUED | OUTPATIENT
Start: 2021-02-10 | End: 2021-02-09

## 2021-02-09 RX ORDER — CARVEDILOL PHOSPHATE 80 MG/1
1 CAPSULE, EXTENDED RELEASE ORAL
Qty: 60 | Refills: 0
Start: 2021-02-09 | End: 2021-03-10

## 2021-02-09 RX ORDER — LISINOPRIL 2.5 MG/1
1 TABLET ORAL
Qty: 30 | Refills: 0
Start: 2021-02-09 | End: 2021-03-10

## 2021-02-09 RX ORDER — LISINOPRIL 2.5 MG/1
1 TABLET ORAL
Qty: 0 | Refills: 0 | DISCHARGE

## 2021-02-09 RX ORDER — ASPIRIN/CALCIUM CARB/MAGNESIUM 324 MG
1 TABLET ORAL
Qty: 30 | Refills: 0
Start: 2021-02-09 | End: 2021-03-10

## 2021-02-09 RX ADMIN — Medication 1: at 08:34

## 2021-02-09 RX ADMIN — CARVEDILOL PHOSPHATE 3.12 MILLIGRAM(S): 80 CAPSULE, EXTENDED RELEASE ORAL at 05:16

## 2021-02-09 RX ADMIN — ENOXAPARIN SODIUM 40 MILLIGRAM(S): 100 INJECTION SUBCUTANEOUS at 12:05

## 2021-02-09 RX ADMIN — Medication 81 MILLIGRAM(S): at 12:05

## 2021-02-09 RX ADMIN — Medication 40 MILLIGRAM(S): at 05:16

## 2021-02-09 RX ADMIN — CARVEDILOL PHOSPHATE 3.12 MILLIGRAM(S): 80 CAPSULE, EXTENDED RELEASE ORAL at 17:32

## 2021-02-09 RX ADMIN — Medication 1: at 12:05

## 2021-02-09 RX ADMIN — Medication 0.5 MILLIGRAM(S): at 12:05

## 2021-02-09 NOTE — DISCHARGE NOTE NURSING/CASE MANAGEMENT/SOCIAL WORK - PATIENT PORTAL LINK FT
You can access the FollowMyHealth Patient Portal offered by [facility] by registering at the following website: [email or web address]. By joining [employer]’s FollowMyHealth portal, you will also be able to view your health information using other applications (apps) compatible with our system.

## 2021-02-09 NOTE — PROGRESS NOTE ADULT - SUBJECTIVE AND OBJECTIVE BOX
PGY-1 Progress Note discussed with attending    PAGER #: [[phone]] TILL 5:00 PM  PLEASE CONTACT ON CALL TEAM:  - On Call Team (Please refer to Quintin) FROM 5:00 PM - 8:30PM  - Nightfloat Team FROM 8:30 -7:30 AM    CHIEF COMPLAINT & BRIEF HOSPITAL COURSE:  52 y/o M with PMHx of CVA 10 years ago (no residual deficits), HTN, not on any medications, diabetes not on any medications, anxiety presented to the ED c/o 1 month of worsening shortness of breath, dyspnea on exertion, chest pain on exertion, orthopnea, PND, dry cough and b/l leg swelling that increased over the last week. Patient admitted for acute heart failure with reduced EF, pro BNP 1705, CXR b/l pleural effusions, s/p IV Lasix, transitioned to PO Lasix, electrolytes replaced as needed, lipid profile abnormal high, on statin  Patient w/ elevated troponin 2/2 demand ischemia, EKG with Q waves V1-V3  Patients A1c 7.8, not on meds at home, upon d/c home Metformin 500mg BID  For COVID19 infection, no remdesivir or decadron indicated, isolation precautions  For hypertension, on Lisinopril     INTERVAL HPI/OVERNIGHT EVENTS: Patient is resting comfortably in no acute distress. Speaking in complete sentences. O2 Sat, no distress, on RA sat 98%, no chest pain, no shortness of breath. Patient states that he feels anxious and claustrophobic in his room. Patient's leg swelling improved.      MEDICATIONS  (STANDING):  aspirin enteric coated 81 milliGRAM(s) Oral daily  atorvastatin 80 milliGRAM(s) Oral at bedtime  carvedilol 3.125 milliGRAM(s) Oral every 12 hours  enoxaparin Injectable 40 milliGRAM(s) SubCutaneous daily  furosemide    Tablet 40 milliGRAM(s) Oral two times a day  insulin lispro (ADMELOG) corrective regimen sliding scale   SubCutaneous Before meals and at bedtime  lisinopril 2.5 milliGRAM(s) Oral daily    MEDICATIONS  (PRN):  acetaminophen   Tablet .. 650 milliGRAM(s) Oral every 6 hours PRN Temp greater or equal to 38C (100.4F), Mild Pain (1 - 3)  ALBUTerol    90 MICROgram(s) HFA Inhaler 2 Puff(s) Inhalation every 6 hours PRN Shortness of Breath  ALPRAZolam 0.5 milliGRAM(s) Oral two times a day PRN anxiety/ panic attack      Vital Signs Last 24 Hrs  T(C): 36.3 (07 Feb 2021 08:32), Max: 37.3 (06 Feb 2021 19:16)  T(F): 97.3 (07 Feb 2021 08:32), Max: 99.1 (06 Feb 2021 19:16)  HR: 79 (07 Feb 2021 08:32) (78 - 91)  BP: 94/63 (07 Feb 2021 08:32) (94/63 - 105/69)  BP(mean): --  RR: 17 (07 Feb 2021 08:32) (17 - 18)  SpO2: 99% (07 Feb 2021 08:32) (96% - 99%)    PHYSICAL EXAMINATION:  GENERAL: NAD, overweight, on RA sat 99%  HEAD:  Atraumatic, Normocephalic  EYES:  conjunctiva and sclera clear  NECK: Supple, No JVD, Normal thyroid  CHEST/LUNG: Clear to auscultation. Clear to percussion bilaterally; No rales, rhonchi, wheezing, or rubs  HEART: Regular rate and rhythm; No murmurs, rubs, or gallops  ABDOMEN: Soft, Nontender, Nondistended; Bowel sounds present, no pain or masses on palpation  NERVOUS SYSTEM:  Alert & Oriented X3  : voiding well  EXTREMITIES:  2+ Peripheral Pulses, No clubbing, or cyanosis. No lower extremity edema  SKIN: warm dry                          13.4   4.90  )-----------( 218      ( 07 Feb 2021 07:02 )             39.6     02-07    138  |  105  |  17  ----------------------------<  140<H>  3.3<L>   |  26  |  0.77    Ca    8.8      07 Feb 2021 07:02  Phos  4.1     02-07  Mg     2.1     02-07    TPro  7.2  /  Alb  3.7  /  TBili  0.7  /  DBili  x   /  AST  19  /  ALT  34  /  AlkPhos  66  02-05    LIVER FUNCTIONS - ( 05 Feb 2021 22:01 )  Alb: 3.7 g/dL / Pro: 7.2 g/dL / ALK PHOS: 66 U/L / ALT: 34 U/L DA / AST: 19 U/L / GGT: x           CARDIAC MARKERS ( 07 Feb 2021 07:02 )  0.277 ng/mL / x     / x     / x     / x      CARDIAC MARKERS ( 06 Feb 2021 12:57 )  0.343 ng/mL / x     / x     / x     / x      CARDIAC MARKERS ( 06 Feb 2021 08:10 )  0.327 ng/mL / x     / x     / x     / x      CARDIAC MARKERS ( 05 Feb 2021 22:01 )  0.393 ng/mL / x     / x     / x     / x          PT/INR - ( 06 Feb 2021 08:10 )   PT: 13.7 sec;   INR: 1.16 ratio         PTT - ( 06 Feb 2021 08:10 )  PTT:31.6 sec    I&O's Summary    06 Feb 2021 07:01  -  07 Feb 2021 07:00  --------------------------------------------------------  IN: 460 mL / OUT: 0 mL / NET: 460 mL                   PGY-1 Progress Note discussed with attending    PAGER #: [[phone]] TILL 5:00 PM  PLEASE CONTACT ON CALL TEAM:  - On Call Team (Please refer to Quintin) FROM 5:00 PM - 8:30PM  - Nightfloat Team FROM 8:30 -7:30 AM    CHIEF COMPLAINT & BRIEF HOSPITAL COURSE:  52 y/o M with PMHx of CVA 10 years ago (no residual deficits), HTN, not on any medications, diabetes not on any medications, anxiety presented to the ED c/o 1 month of worsening shortness of breath, dyspnea on exertion, chest pain on exertion, orthopnea, PND, dry cough and b/l leg swelling that increased over the last week. Patient admitted for acute heart failure with reduced EF, pro BNP 1705, CXR b/l pleural effusions, s/p IV Lasix, transitioned to PO Lasix, statin, Carvedilol, lisinopril, electrolytes replaced as needed, lipid profile abnormal high  Patient w/ elevated troponin 2/2 demand ischemia, EKG with Q waves V1-V3  Patients A1c 7.8, not on meds at home, upon d/c home Metformin 500mg BID  For COVID19 infection, no remdesivir or decadron indicated, isolation precautions  For hypertension not requiring any meds     INTERVAL HPI/OVERNIGHT EVENTS: Patient is resting comfortably in no acute distress. Speaking in complete sentences. O2 Sat, no distress, on RA sat 98%, no chest pain, no shortness of breath. Patient states that he feels anxious and claustrophobic in his room. Patient's leg swelling improved.      MEDICATIONS  (STANDING):  aspirin enteric coated 81 milliGRAM(s) Oral daily  atorvastatin 80 milliGRAM(s) Oral at bedtime  carvedilol 3.125 milliGRAM(s) Oral every 12 hours  enoxaparin Injectable 40 milliGRAM(s) SubCutaneous daily  furosemide    Tablet 40 milliGRAM(s) Oral two times a day  insulin lispro (ADMELOG) corrective regimen sliding scale   SubCutaneous Before meals and at bedtime  lisinopril 2.5 milliGRAM(s) Oral daily    MEDICATIONS  (PRN):  acetaminophen   Tablet .. 650 milliGRAM(s) Oral every 6 hours PRN Temp greater or equal to 38C (100.4F), Mild Pain (1 - 3)  ALBUTerol    90 MICROgram(s) HFA Inhaler 2 Puff(s) Inhalation every 6 hours PRN Shortness of Breath  ALPRAZolam 0.5 milliGRAM(s) Oral two times a day PRN anxiety/ panic attack      Vital Signs Last 24 Hrs  T(C): 36.3 (07 Feb 2021 08:32), Max: 37.3 (06 Feb 2021 19:16)  T(F): 97.3 (07 Feb 2021 08:32), Max: 99.1 (06 Feb 2021 19:16)  HR: 79 (07 Feb 2021 08:32) (78 - 91)  BP: 94/63 (07 Feb 2021 08:32) (94/63 - 105/69)  BP(mean): --  RR: 17 (07 Feb 2021 08:32) (17 - 18)  SpO2: 99% (07 Feb 2021 08:32) (96% - 99%)    PHYSICAL EXAMINATION:  GENERAL: NAD, overweight, on RA sat 99%  HEAD:  Atraumatic, Normocephalic  EYES:  conjunctiva and sclera clear  NECK: Supple, No JVD, Normal thyroid  CHEST/LUNG: Clear to auscultation. Clear to percussion bilaterally; No rales, rhonchi, wheezing, or rubs  HEART: Regular rate and rhythm; No murmurs, rubs, or gallops  ABDOMEN: Soft, Nontender, Nondistended; Bowel sounds present, no pain or masses on palpation  NERVOUS SYSTEM:  Alert & Oriented X3  : voiding well  EXTREMITIES:  2+ Peripheral Pulses, No clubbing, or cyanosis. No lower extremity edema  SKIN: warm dry                          13.4   4.90  )-----------( 218      ( 07 Feb 2021 07:02 )             39.6     02-07    138  |  105  |  17  ----------------------------<  140<H>  3.3<L>   |  26  |  0.77    Ca    8.8      07 Feb 2021 07:02  Phos  4.1     02-07  Mg     2.1     02-07    TPro  7.2  /  Alb  3.7  /  TBili  0.7  /  DBili  x   /  AST  19  /  ALT  34  /  AlkPhos  66  02-05    LIVER FUNCTIONS - ( 05 Feb 2021 22:01 )  Alb: 3.7 g/dL / Pro: 7.2 g/dL / ALK PHOS: 66 U/L / ALT: 34 U/L DA / AST: 19 U/L / GGT: x           CARDIAC MARKERS ( 07 Feb 2021 07:02 )  0.277 ng/mL / x     / x     / x     / x      CARDIAC MARKERS ( 06 Feb 2021 12:57 )  0.343 ng/mL / x     / x     / x     / x      CARDIAC MARKERS ( 06 Feb 2021 08:10 )  0.327 ng/mL / x     / x     / x     / x      CARDIAC MARKERS ( 05 Feb 2021 22:01 )  0.393 ng/mL / x     / x     / x     / x          PT/INR - ( 06 Feb 2021 08:10 )   PT: 13.7 sec;   INR: 1.16 ratio         PTT - ( 06 Feb 2021 08:10 )  PTT:31.6 sec    I&O's Summary    06 Feb 2021 07:01  -  07 Feb 2021 07:00  --------------------------------------------------------  IN: 460 mL / OUT: 0 mL / NET: 460 mL

## 2021-02-09 NOTE — PROGRESS NOTE ADULT - PROBLEM SELECTOR PLAN 1
-Pt presented with SOB, orthopnea, PND, JVD likely 2/2 Acute Decompensated Heart Failure  -Pro-BNP= 1705  -CXR showed bilateral lower lung fields blunting of the costophrenic angles  -Telemetry for continuous cardiac monitoring  -Daily Weights, Low Sodium Diet, Strict I&Os, Fluid Restrictions  -Lipid Panel abnormal, Hemoglobin A1c 7.8, TSH 1.15  -S/p Lasix 40mg IV BID  -C/w Lasix 40 mg po BID   -C/w Aspirin, lisinopril and Statin  -K replaced x1 po  -F/u TTE   -Dr. Fairchild on board -Pt presented with SOB, orthopnea, PND, JVD likely 2/2 Acute Decompensated Heart Failure  -Pro-BNP= 1705  -CXR showed bilateral lower lung fields blunting of the costophrenic angles  -Telemetry for continuous cardiac monitoring  -Daily Weights, Low Sodium Diet, Strict I&Os, Fluid Restrictions  -Lipid Panel abnormal, Hemoglobin A1c 7.8, TSH 1.15  -S/p Lasix 40mg IV BID  -C/w Lasix 40 mg po BID   -C/w Aspirin, lisinopril and Statin  -S/p K replaced x1 po  -F/u TTE   -Dr. Fairchild on board

## 2021-02-09 NOTE — PROGRESS NOTE ADULT - PROBLEM SELECTOR PLAN 7
IMPROVE VTE Individual Risk Assessment  RISK                                                         Points  [  ] Previous VTE                                      3  [  ] Thrombophilia                                   2  [  ] Lower limb paralysis                         2 (unable to hold up >15 seconds)    [  ] Current Cancer                                  2       (within 6 months)  [  ] Immobilization > 24 hrs                    1  [  ] ICU/CCU stay > 24 hrs                         1  [  ] Age > 60                                              1  C/w Lovenox for DVT ppx

## 2021-02-09 NOTE — PROGRESS NOTE ADULT - ASSESSMENT
50 y/o M pt with a PMHx of CVA 10 years ago (no residual deficits), HTN? with no medication, anxiety presented to the ED c/o 1 month of worsening shortness of breath, dyspnea on exertion, chest pain on exertion, orthopnea, PND, dry cough and b/l leg swelling increasing over the last week. Pt is admitted for CHF  
50 y/o M pt with a PMHx of CVA 10 years ago (no residual deficits), HTN? with no medication, anxiety presented to the ED c/o 1 month of worsening shortness of breath, dyspnea on exertion, chest pain on exertion, orthopnea, PND, dry cough and b/l leg swelling increasing over the last week. Pt is admitted for CHF  
52 y/o M pt with a PMHx of CVA 10 years ago (no residual deficits), HTN? with no medication, anxiety presented to the ED c/o 1 month of worsening shortness of breath, dyspnea on exertion, chest pain on exertion, orthopnea, PND, dry cough and b/l leg swelling increasing over the last week. Pt is admitted for CHF

## 2021-02-11 ENCOUNTER — TRANSCRIPTION ENCOUNTER (OUTPATIENT)
Age: 51
End: 2021-02-11

## 2021-02-12 RX ORDER — FUROSEMIDE 40 MG
1 TABLET ORAL
Qty: 30 | Refills: 0
Start: 2021-02-12 | End: 2021-03-13

## 2021-02-12 RX ORDER — FUROSEMIDE 40 MG
1 TABLET ORAL
Qty: 0 | Refills: 0 | DISCHARGE

## 2021-02-16 ENCOUNTER — TRANSCRIPTION ENCOUNTER (OUTPATIENT)
Age: 51
End: 2021-02-16

## 2021-02-19 ENCOUNTER — TRANSCRIPTION ENCOUNTER (OUTPATIENT)
Age: 51
End: 2021-02-19

## 2021-10-06 NOTE — PROGRESS NOTE ADULT - PROBLEM SELECTOR PLAN 3
-P/w SOB, cough, PND, orthopnea  -CXR as above   -Isolation precautions  -Procalcitonin 0.07, Ferritin 1579  -Tylenol, Albuterol Inhaler PRN 100% of the time/able to follow multistep instructions

## 2021-10-15 ENCOUNTER — INPATIENT (INPATIENT)
Facility: HOSPITAL | Age: 51
LOS: 13 days | Discharge: HOME | DRG: 617 | End: 2021-10-29
Attending: STUDENT IN AN ORGANIZED HEALTH CARE EDUCATION/TRAINING PROGRAM | Admitting: STUDENT IN AN ORGANIZED HEALTH CARE EDUCATION/TRAINING PROGRAM
Payer: COMMERCIAL

## 2021-10-15 VITALS
HEIGHT: 73 IN | OXYGEN SATURATION: 98 % | RESPIRATION RATE: 17 BRPM | SYSTOLIC BLOOD PRESSURE: 127 MMHG | DIASTOLIC BLOOD PRESSURE: 78 MMHG | WEIGHT: 214.95 LBS | TEMPERATURE: 100 F | HEART RATE: 102 BPM

## 2021-10-15 DIAGNOSIS — L03.115 CELLULITIS OF RIGHT LOWER LIMB: ICD-10-CM

## 2021-10-15 DIAGNOSIS — I50.9 HEART FAILURE, UNSPECIFIED: ICD-10-CM

## 2021-10-15 DIAGNOSIS — D64.9 ANEMIA, UNSPECIFIED: ICD-10-CM

## 2021-10-15 DIAGNOSIS — F41.9 ANXIETY DISORDER, UNSPECIFIED: ICD-10-CM

## 2021-10-15 DIAGNOSIS — E11.9 TYPE 2 DIABETES MELLITUS WITHOUT COMPLICATIONS: ICD-10-CM

## 2021-10-15 DIAGNOSIS — Z29.9 ENCOUNTER FOR PROPHYLACTIC MEASURES, UNSPECIFIED: ICD-10-CM

## 2021-10-15 DIAGNOSIS — I10 ESSENTIAL (PRIMARY) HYPERTENSION: ICD-10-CM

## 2021-10-15 DIAGNOSIS — Z78.9 OTHER SPECIFIED HEALTH STATUS: Chronic | ICD-10-CM

## 2021-10-15 DIAGNOSIS — E78.5 HYPERLIPIDEMIA, UNSPECIFIED: ICD-10-CM

## 2021-10-15 PROBLEM — I63.9 CEREBRAL INFARCTION, UNSPECIFIED: Chronic | Status: ACTIVE | Noted: 2021-02-05

## 2021-10-15 LAB
ALBUMIN SERPL ELPH-MCNC: 2.7 G/DL — LOW (ref 3.5–5)
ALP SERPL-CCNC: 68 U/L — SIGNIFICANT CHANGE UP (ref 40–120)
ALT FLD-CCNC: 24 U/L DA — SIGNIFICANT CHANGE UP (ref 10–60)
ANION GAP SERPL CALC-SCNC: 9 MMOL/L — SIGNIFICANT CHANGE UP (ref 5–17)
APTT BLD: 32.6 SEC — SIGNIFICANT CHANGE UP (ref 27.5–35.5)
AST SERPL-CCNC: 14 U/L — SIGNIFICANT CHANGE UP (ref 10–40)
BASOPHILS # BLD AUTO: 0.02 K/UL — SIGNIFICANT CHANGE UP (ref 0–0.2)
BASOPHILS NFR BLD AUTO: 0.2 % — SIGNIFICANT CHANGE UP (ref 0–2)
BILIRUB SERPL-MCNC: 0.7 MG/DL — SIGNIFICANT CHANGE UP (ref 0.2–1.2)
BLD GP AB SCN SERPL QL: SIGNIFICANT CHANGE UP
BUN SERPL-MCNC: 19 MG/DL — HIGH (ref 7–18)
CALCIUM SERPL-MCNC: 8.9 MG/DL — SIGNIFICANT CHANGE UP (ref 8.4–10.5)
CHLORIDE SERPL-SCNC: 100 MMOL/L — SIGNIFICANT CHANGE UP (ref 96–108)
CO2 SERPL-SCNC: 26 MMOL/L — SIGNIFICANT CHANGE UP (ref 22–31)
CREAT SERPL-MCNC: 1.2 MG/DL — SIGNIFICANT CHANGE UP (ref 0.5–1.3)
EOSINOPHIL # BLD AUTO: 0.01 K/UL — SIGNIFICANT CHANGE UP (ref 0–0.5)
EOSINOPHIL NFR BLD AUTO: 0.1 % — SIGNIFICANT CHANGE UP (ref 0–6)
ERYTHROCYTE [SEDIMENTATION RATE] IN BLOOD: 99 MM/HR — HIGH (ref 0–20)
GLUCOSE BLDC GLUCOMTR-MCNC: 291 MG/DL — HIGH (ref 70–99)
GLUCOSE SERPL-MCNC: 190 MG/DL — HIGH (ref 70–99)
HCT VFR BLD CALC: 30.7 % — LOW (ref 39–50)
HGB BLD-MCNC: 10.4 G/DL — LOW (ref 13–17)
IMM GRANULOCYTES NFR BLD AUTO: 0.5 % — SIGNIFICANT CHANGE UP (ref 0–1.5)
INR BLD: 1.37 RATIO — HIGH (ref 0.88–1.16)
LACTATE SERPL-SCNC: 1.4 MMOL/L — SIGNIFICANT CHANGE UP (ref 0.7–2)
LYMPHOCYTES # BLD AUTO: 0.76 K/UL — LOW (ref 1–3.3)
LYMPHOCYTES # BLD AUTO: 6.1 % — LOW (ref 13–44)
MCHC RBC-ENTMCNC: 30.3 PG — SIGNIFICANT CHANGE UP (ref 27–34)
MCHC RBC-ENTMCNC: 33.9 GM/DL — SIGNIFICANT CHANGE UP (ref 32–36)
MCV RBC AUTO: 89.5 FL — SIGNIFICANT CHANGE UP (ref 80–100)
MONOCYTES # BLD AUTO: 0.74 K/UL — SIGNIFICANT CHANGE UP (ref 0–0.9)
MONOCYTES NFR BLD AUTO: 6 % — SIGNIFICANT CHANGE UP (ref 2–14)
NEUTROPHILS # BLD AUTO: 10.77 K/UL — HIGH (ref 1.8–7.4)
NEUTROPHILS NFR BLD AUTO: 87.1 % — HIGH (ref 43–77)
NRBC # BLD: 0 /100 WBCS — SIGNIFICANT CHANGE UP (ref 0–0)
PLATELET # BLD AUTO: 303 K/UL — SIGNIFICANT CHANGE UP (ref 150–400)
POTASSIUM SERPL-MCNC: 3.8 MMOL/L — SIGNIFICANT CHANGE UP (ref 3.5–5.3)
POTASSIUM SERPL-SCNC: 3.8 MMOL/L — SIGNIFICANT CHANGE UP (ref 3.5–5.3)
PROT SERPL-MCNC: 7.7 G/DL — SIGNIFICANT CHANGE UP (ref 6–8.3)
PROTHROM AB SERPL-ACNC: 16.1 SEC — HIGH (ref 10.6–13.6)
RBC # BLD: 3.43 M/UL — LOW (ref 4.2–5.8)
RBC # FLD: 12 % — SIGNIFICANT CHANGE UP (ref 10.3–14.5)
SARS-COV-2 RNA SPEC QL NAA+PROBE: SIGNIFICANT CHANGE UP
SODIUM SERPL-SCNC: 135 MMOL/L — SIGNIFICANT CHANGE UP (ref 135–145)
WBC # BLD: 12.36 K/UL — HIGH (ref 3.8–10.5)
WBC # FLD AUTO: 12.36 K/UL — HIGH (ref 3.8–10.5)

## 2021-10-15 PROCEDURE — 93010 ELECTROCARDIOGRAM REPORT: CPT

## 2021-10-15 PROCEDURE — 99223 1ST HOSP IP/OBS HIGH 75: CPT | Mod: GC

## 2021-10-15 PROCEDURE — 99285 EMERGENCY DEPT VISIT HI MDM: CPT

## 2021-10-15 PROCEDURE — 73620 X-RAY EXAM OF FOOT: CPT | Mod: 26,RT

## 2021-10-15 PROCEDURE — 71045 X-RAY EXAM CHEST 1 VIEW: CPT | Mod: 26

## 2021-10-15 RX ORDER — CEFEPIME 1 G/1
2000 INJECTION, POWDER, FOR SOLUTION INTRAMUSCULAR; INTRAVENOUS EVERY 12 HOURS
Refills: 0 | Status: DISCONTINUED | OUTPATIENT
Start: 2021-10-15 | End: 2021-10-19

## 2021-10-15 RX ORDER — OXYCODONE AND ACETAMINOPHEN 5; 325 MG/1; MG/1
2 TABLET ORAL ONCE
Refills: 0 | Status: DISCONTINUED | OUTPATIENT
Start: 2021-10-15 | End: 2021-10-19

## 2021-10-15 RX ORDER — FUROSEMIDE 40 MG
20 TABLET ORAL DAILY
Refills: 0 | Status: DISCONTINUED | OUTPATIENT
Start: 2021-10-16 | End: 2021-10-17

## 2021-10-15 RX ORDER — ENOXAPARIN SODIUM 100 MG/ML
40 INJECTION SUBCUTANEOUS DAILY
Refills: 0 | Status: DISCONTINUED | OUTPATIENT
Start: 2021-10-15 | End: 2021-10-19

## 2021-10-15 RX ORDER — ASPIRIN/CALCIUM CARB/MAGNESIUM 324 MG
81 TABLET ORAL DAILY
Refills: 0 | Status: DISCONTINUED | OUTPATIENT
Start: 2021-10-15 | End: 2021-10-19

## 2021-10-15 RX ORDER — CARVEDILOL PHOSPHATE 80 MG/1
3.12 CAPSULE, EXTENDED RELEASE ORAL EVERY 12 HOURS
Refills: 0 | Status: DISCONTINUED | OUTPATIENT
Start: 2021-10-15 | End: 2021-10-18

## 2021-10-15 RX ORDER — ATORVASTATIN CALCIUM 80 MG/1
80 TABLET, FILM COATED ORAL AT BEDTIME
Refills: 0 | Status: DISCONTINUED | OUTPATIENT
Start: 2021-10-15 | End: 2021-10-19

## 2021-10-15 RX ORDER — ALPRAZOLAM 0.25 MG
0.5 TABLET ORAL
Refills: 0 | Status: DISCONTINUED | OUTPATIENT
Start: 2021-10-15 | End: 2021-10-19

## 2021-10-15 RX ORDER — INSULIN LISPRO 100/ML
VIAL (ML) SUBCUTANEOUS
Refills: 0 | Status: DISCONTINUED | OUTPATIENT
Start: 2021-10-15 | End: 2021-10-19

## 2021-10-15 RX ORDER — VANCOMYCIN HCL 1 G
1000 VIAL (EA) INTRAVENOUS ONCE
Refills: 0 | Status: COMPLETED | OUTPATIENT
Start: 2021-10-15 | End: 2021-10-15

## 2021-10-15 RX ORDER — ACETAMINOPHEN 500 MG
650 TABLET ORAL EVERY 6 HOURS
Refills: 0 | Status: DISCONTINUED | OUTPATIENT
Start: 2021-10-15 | End: 2021-10-19

## 2021-10-15 RX ORDER — PIPERACILLIN AND TAZOBACTAM 4; .5 G/20ML; G/20ML
3.38 INJECTION, POWDER, LYOPHILIZED, FOR SOLUTION INTRAVENOUS ONCE
Refills: 0 | Status: COMPLETED | OUTPATIENT
Start: 2021-10-15 | End: 2021-10-15

## 2021-10-15 RX ORDER — FUROSEMIDE 40 MG
40 TABLET ORAL ONCE
Refills: 0 | Status: COMPLETED | OUTPATIENT
Start: 2021-10-15 | End: 2021-10-15

## 2021-10-15 RX ORDER — VANCOMYCIN HCL 1 G
1500 VIAL (EA) INTRAVENOUS EVERY 12 HOURS
Refills: 0 | Status: DISCONTINUED | OUTPATIENT
Start: 2021-10-15 | End: 2021-10-19

## 2021-10-15 RX ORDER — ONDANSETRON 8 MG/1
4 TABLET, FILM COATED ORAL EVERY 6 HOURS
Refills: 0 | Status: DISCONTINUED | OUTPATIENT
Start: 2021-10-15 | End: 2021-10-19

## 2021-10-15 RX ORDER — ALPRAZOLAM 0.25 MG
1 TABLET ORAL
Qty: 0 | Refills: 0 | DISCHARGE

## 2021-10-15 RX ORDER — OXYCODONE AND ACETAMINOPHEN 5; 325 MG/1; MG/1
1 TABLET ORAL ONCE
Refills: 0 | Status: DISCONTINUED | OUTPATIENT
Start: 2021-10-15 | End: 2021-10-15

## 2021-10-15 RX ADMIN — Medication 250 MILLIGRAM(S): at 15:22

## 2021-10-15 RX ADMIN — PIPERACILLIN AND TAZOBACTAM 200 GRAM(S): 4; .5 INJECTION, POWDER, LYOPHILIZED, FOR SOLUTION INTRAVENOUS at 14:19

## 2021-10-15 RX ADMIN — Medication 650 MILLIGRAM(S): at 22:29

## 2021-10-15 RX ADMIN — Medication 650 MILLIGRAM(S): at 23:52

## 2021-10-15 RX ADMIN — Medication 0.5 MILLIGRAM(S): at 22:29

## 2021-10-15 RX ADMIN — Medication 40 MILLIGRAM(S): at 20:23

## 2021-10-15 RX ADMIN — ATORVASTATIN CALCIUM 80 MILLIGRAM(S): 80 TABLET, FILM COATED ORAL at 22:07

## 2021-10-15 NOTE — H&P ADULT - ATTENDING COMMENTS
Patient was seen and examined at bedside in ER. History as above. Concern for puncture wound. Did not improve with outpatient Keflex or Clindamycin. Complains of chills but no fever. S/p I&D at bedside by podiatry.    Vitals stable     P/E:  as above, no nadia pus appreciated.     labs noted     A/P:  Cellulitis of right foot , to r/o abscess or and OM  Chronic systolic HF  DM  HTN    Plan:  Will start Vancomycin and Cefepime   ID consult   MRI of foot   One dose IV Lasix 40mg today, followed by continued home dose po Lasix, to minimize LE edema, patient not volume overloaded   Monitor BP off meds  ISS   Obtain A1c  Needs counselling for medication compliance

## 2021-10-15 NOTE — H&P ADULT - PROBLEM SELECTOR PLAN 8
IMPROVE VTE Individual Risk Assessment   RISK                                                          Points  [  ] Previous VTE                                                3  [  ] Thrombophilia                                             2  [  ] Lower limb paralysis                                    2        (unable to hold up >15 seconds)    [  ] Current Cancer                                             2         (within 6 months)  [  x] Immobilization > 24 hrs                              1  [  ] ICU/CCU stay > 24 hours                            1  [  ] Age > 60                                                    1  IMPROVE VTE Score _________1    Lovenox for DVT prophylaxis

## 2021-10-15 NOTE — ED ADULT NURSE NOTE - OBJECTIVE STATEMENT
Patient present to Ed with c/o right foot wound. As per patient he stepped on a staple and wasn't aware until next day was seen by MD due to diabetic and was given antidotic but didn't get better

## 2021-10-15 NOTE — H&P ADULT - HISTORY OF PRESENT ILLNESS
51 year old male with PMH CVA (10 yeas ago, no deficits), HTN, DM, CHF (EF 30-35% 2/21), presented with right foot swelling. Patient stated two weeks ago he noticed bleeding in his shoes and found a staple stuck to the bottom of his feet. He didn't notice it before because he states he doesn't have any sensation in his feet. He went to urgent care, where they prescribed him Keflex Q8hrs. Patient states the foot started to swell more so he returned to urgent care yesterday where they gave him a prescription of clindamycin. Patient does endorse chills, nausea, but denies any fever, vomiting, chest pain, SOB, abdominal pain, or changes in bowel habits.    In the ED:  Afebrile, 120-127/78  HR   98% RA  WBC 12.3, Hb 10.4, PT 16.1, INR 1.37  Lactate WNL   CXR: no focal infiltrates or consolidation   XRAY Right foot: pending  Given Dot   Podiatry performed I/D with  51 year old male with PMH CVA (10 yeas ago, no deficits), HTN, DM, CHF (EF 30-35% 2/21), presented with right foot swelling. Patient stated two weeks ago he noticed bleeding in his shoes and found a staple stuck to the bottom of his feet. He didn't notice it before because he states he doesn't have any sensation in his feet. He went to urgent care, where they prescribed him Keflex Q8hrs. Patient states the foot started to swell more so he returned to urgent care yesterday where they gave him a prescription of clindamycin. Patient does endorse chills, nausea, but denies any fever, vomiting, chest pain, SOB, abdominal pain, or changes in bowel habits.    In the ED:  Afebrile, 120-127/78  HR   98% RA  WBC 12.3, Hb 10.4, PT 16.1, INR 1.37  Lactate WNL   CXR: no focal infiltrates or consolidation   XRAY Right foot: pending  Given Renetta and Ronan   Podiatry performed I/D with sanguinous drainage upon manual manipulation  51 year old male with PMH CVA (10 yeas ago, no deficits), HTN, DM, CHF (EF 30-35% 2/21), presented with right foot swelling. Patient stated two weeks ago he noticed bleeding in his shoes and found a staple stuck to the bottom of his feet. He didn't notice it before because he states he doesn't have any sensation in his feet. He went to urgent care, where they prescribed him Keflex Q8hrs. Patient states the foot started to swell more so he returned to urgent care yesterday where they gave him a prescription of clindamycin. Patient this morning started to endorse chills, nausea, but denies any fever, vomiting, chest pain, SOB, abdominal pain, or changes in bowel habits.    In the ED:  Afebrile, 120-127/78  HR   98% RA  WBC 12.3, Hb 10.4, PT 16.1, INR 1.37  Lactate WNL   CXR: no focal infiltrates or consolidation   XRAY Right foot: pending  Given Renetta and Ronan   Podiatry performed I/D with sanguinous drainage upon manual manipulation  51 year old male with PMH CVA (10 yeas ago, no deficits), HTN, DM, CHF (EF 30-35% 2/21), presented with right foot swelling. Patient stated two weeks ago he noticed bleeding in his shoes and found a staple stuck to the bottom of his feet. He didn't notice it before because he states he doesn't have any sensation in his feet. He went to urgent care, where they prescribed him Keflex Q8hrs. Patient states the foot started to swell more so he returned to urgent care yesterday where they gave him a prescription of clindamycin. Patient this morning started to endorse chills, nausea, but denies any fever, vomiting, chest pain, SOB, abdominal pain, or changes in bowel habits.  Patient was admitted in February due to covid, and was found to have CHF and DM. Patient has not been complaint with any of his medications and states he just takes Lasix and Xanax.     In the ED:  Afebrile, 120-127/78  HR   98% RA  WBC 12.3, Hb 10.4, PT 16.1, INR 1.37  Lactate WNL   CXR: no focal infiltrates or consolidation   XRAY Right foot: pending  Given Vanc and Ronan   Podiatry performed I/D with sanguinous drainage upon manual manipulation  51 year old male with PMH CVA (10 years ago, no deficits), HTN, DM, CHF (EF 30-35% 2/21), presented with right foot swelling. Patient stated two weeks ago he noticed bleeding in his shoes and found a staple stuck to the bottom of his feet. He didn't notice it before because he states he doesn't have any sensation in his feet. He went to urgent care, where they prescribed him Keflex Q8hrs. Patient states the foot started to swell more so he returned to urgent care yesterday where they gave him a prescription of clindamycin. Patient this morning started to endorse chills, nausea, but denies any fever, vomiting, chest pain, SOB, abdominal pain, or changes in bowel habits.  Patient was admitted in February due to covid, and was found to have CHF and DM. Patient has not been complaint with any of his medications and states he just takes Lasix and Xanax.     In the ED:  Afebrile, 120-127/78  HR   98% RA  WBC 12.3, Hb 10.4, PT 16.1, INR 1.37  Lactate WNL   CXR: no focal infiltrates or consolidation   XRAY Right foot: pending  Given Renetta and Ronan   Podiatry performed I/D with sanguinous drainage upon manual manipulation

## 2021-10-15 NOTE — H&P ADULT - ASSESSMENT
51 year old male with PMH CVA (10 yeas ago, no deficits), HTN, DM, CHF (EF 30-35% 2/21), presented with right foot swelling. Podiatry performed an I/D and pt was admitted for cellulitis

## 2021-10-15 NOTE — H&P ADULT - PROBLEM SELECTOR PLAN 6
Hb 10.4, baseline is 14  MCV 89.5  No signs of bleeding, denies any melena or blood in stools  likely normocytic vs macrocytic anemia  Can follow outpatient for anemia work up Hb 10.4, baseline is 14  MCV 89.5  No signs of bleeding, denies any melena or blood in stools  likely normocytic vs macrocytic anemia  Can follow outpatient for anemia work up  monitor CBC daily

## 2021-10-15 NOTE — H&P ADULT - PROBLEM SELECTOR PLAN 7
IMPROVE VTE Individual Risk Assessment   RISK                                                          Points  [  ] Previous VTE                                                3  [  ] Thrombophilia                                             2  [  ] Lower limb paralysis                                    2        (unable to hold up >15 seconds)    [  ] Current Cancer                                             2         (within 6 months)  [  x] Immobilization > 24 hrs                              1  [  ] ICU/CCU stay > 24 hours                            1  [  ] Age > 60                                                    1  IMPROVE VTE Score _________1    Lovenox for DVT prophylaxis has hx of anxiety, on xanax at home  c/w home medications has hx of anxiety, on xanax at home  c/w home medications PRN

## 2021-10-15 NOTE — H&P ADULT - PROBLEM SELECTOR PLAN 3
pt was diagnosed in 2/21 w/ HFpEF EF 30-35%  Not compliant with medications Coreg 3.125 BID, and Lasix 20mg  CXR: no signs of fluid overload   PT has bilateral pitting edema +1  Will give Lasix 40 IV and then start with Lasix 20mg oral tomorrow pt was diagnosed in 2/21 w/ HFrEF  30-35%  Not compliant with medications Coreg 3.125 BID, and Lasix 20mg, lisinopril  CXR: no signs of fluid overload   PT has bilateral pitting edema +1  Will give Lasix 40 IV and then start with Lasix 20mg oral tomorrow pt was diagnosed in 2/21 w/ HFrEF  30-35%  Not compliant with medications Coreg 3.125 BID, and Lasix 20mg, lisinopril  CXR: no signs of fluid overload   PT has bilateral pitting edema +1  Will give Lasix 40 IV stat now one dose and then start with Lasix 20mg oral tomorrow  fluid restrictions  daily weight  strict I/O

## 2021-10-15 NOTE — ED ADULT NURSE NOTE - NSIMPLEMENTINTERV_GEN_ALL_ED
Implemented All Universal Safety Interventions:  [place] to call system. Call bell, personal items and telephone within reach. Instruct patient to call for assistance. Room bathroom lighting operational. Non-slip footwear when patient is off stretcher. Physically safe environment: no spills, clutter or unnecessary equipment. Stretcher in lowest position, wheels locked, appropriate side rails in place.

## 2021-10-15 NOTE — H&P ADULT - NSHPREVIEWOFSYSTEMS_GEN_ALL_CORE
REVIEW OF SYSTEMS:    CONSTITUTIONAL: No weakness, fevers, + chills  RESPIRATORY: No cough, wheezing, hemoptysis; No shortness of breath  CARDIOVASCULAR: No chest pain or palpitations  GASTROINTESTINAL: No abdominal or epigastric pain. + nausea, no vomiting, or hematemesis; No diarrhea or constipation. No melena or hematochezia.  GENITOURINARY: No dysuria, frequency or hematuria  NEUROLOGICAL: + numbness feet bilateral   SKIN: No itching, burning, rashes, or lesions   All other review of systems is negative unless indicated above.

## 2021-10-15 NOTE — H&P ADULT - PROBLEM SELECTOR PLAN 5
not compliant with taking medications  Will start his home atorvastatin not compliant with taking medications  Will start his home atorvastatin  DASH diet

## 2021-10-15 NOTE — ED PROVIDER NOTE - OBJECTIVE STATEMENT
Patient is a 50 y/o male who presents with worsening right foot pain and wound x2 weeks for which he was prescribed keflex course which he completed and a clindamycin course which he just started. Patient has PMH significant for DM, CHF. Patient reports that a "contractor grade staple" punctured his foot and he didn't realize until the next morning when he couldn't bear weight. He reports he is up to date on his tetanus shot. He reports chills but denies fever as he "did not check it" and ascending leg pain at times. Denies shortness of breath, chest pain or pressure, nausea or vomiting.

## 2021-10-15 NOTE — CHART NOTE - NSCHARTNOTEFT_GEN_A_CORE
EVENT: Paged by RN, pt c/o pain in right foot, and is also febrile to 103. He is also refusing night time insulin coverage.     HPI:  51 year old male with PMH CVA (10 yeas ago, no deficits), HTN, DM, CHF (EF 30-35% 2/21), presented with right foot swelling. Podiatry performed an I/D and pt was admitted for cellulitis.     SUBJECTIVE: Pt reports right leg pain and is requesting medication for     OBJECTIVE:  Vital Signs Last 24 Hrs  T(C): 39.4 (15 Oct 2021 22:25), Max: 39.4 (15 Oct 2021 22:25)  T(F): 103 (15 Oct 2021 22:25), Max: 103 (15 Oct 2021 22:25)  HR: 122 (15 Oct 2021 22:25) (99 - 122)  BP: 122/74 (15 Oct 2021 22:25) (120/85 - 127/78)  BP(mean): --  RR: 16 (15 Oct 2021 22:25) (16 - 17)  SpO2: 96% (15 Oct 2021 22:25) (96% - 100%)    PHYSICAL EXAM:  Neuro: Awake and alert, oriented to person, place, and time  Cardiovascular: + S1, S2, no murmurs, rubs, or bruits  Respiratory: clear to auscultation bilaterally with good air entry   GI: Abdomen soft, non-tender, bowel sounds present   : Non distended;   Skin: warm and dry; no rash      LABS:                        10.4   12.36 )-----------( 303      ( 15 Oct 2021 13:37 )             30.7     10-15    135  |  100  |  19<H>  ----------------------------<  190<H>  3.8   |  26  |  1.20    Ca    8.9      15 Oct 2021 13:37    TPro  7.7  /  Alb  2.7<L>  /  TBili  0.7  /  DBili  x   /  AST  14  /  ALT  24  /  AlkPhos  68  10-15        EKG:   IMAGING:    ASSESSMENT:    PLAN:     FOLLOW UP / RESULT: EVENT: Paged by RN, pt c/o pain in right foot, and is also febrile to 103 and tachycardic (asymptomatic. He is also refusing night time insulin coverage.     HPI:  51 year old male with PMH CVA (10 yeas ago, no deficits), HTN, DM, CHF (EF 30-35% 2/21), presented with right foot swelling. Podiatry performed an I/D and pt was admitted for cellulitis.     SUBJECTIVE: Pt reports right leg pain and is requesting medication for relief. Night time blood glucose of 291, refusing night coverage regardless of education of the importance of insulin.     OBJECTIVE:  Vital Signs Last 24 Hrs  T(C): 39.4 (15 Oct 2021 22:25), Max: 39.4 (15 Oct 2021 22:25)  T(F): 103 (15 Oct 2021 22:25), Max: 103 (15 Oct 2021 22:25)  HR: 122 (15 Oct 2021 22:25) (99 - 122)  BP: 122/74 (15 Oct 2021 22:25) (120/85 - 127/78)  BP(mean): --  RR: 16 (15 Oct 2021 22:25) (16 - 17)  SpO2: 96% (15 Oct 2021 22:25) (96% - 100%)    PHYSICAL EXAM:  Neuro: Awake and alert, oriented to person, place, and time  Cardiovascular: tachycardic to 122  Respiratory: clear to auscultation bilaterally with good air entry   GI: Abdomen soft, non-tender, bowel sounds present   : Non distended;   Extremities: right foot with dsg   Skin: warm and dry; no rash      LABS:                        10.4   12.36 )-----------( 303      ( 15 Oct 2021 13:37 )             30.7     10-15    135  |  100  |  19<H>  ----------------------------<  190<H>  3.8   |  26  |  1.20    Ca    8.9      15 Oct 2021 13:37    TPro  7.7  /  Alb  2.7<L>  /  TBili  0.7  /  DBili  x   /  AST  14  /  ALT  24  /  AlkPhos  68  10-15        EKG:   IMAGING:    ASSESSMENT: 1. Right foot pain likely due to underlying cellulitis 2. Fever likely due to infectious process, tachycardia likely due to pain    PLAN:     -Percocet 2 tabs x 1 dose  -Continue Cefepime and Vanco as ordered  -C/w Tylenol 650 mg q 6 for fever  -Continue current/supportive care    FOLLOW UP / RESULT:    -Monitor effectiveness of above intervention  -Reassess v/s, and pain per hospital policy

## 2021-10-15 NOTE — H&P ADULT - PROBLEM SELECTOR PLAN 2
pt has a hx of HTN, non compliant with lisinopril 2.5mg at home  -127/78  Continue to monitor pt has a hx of HTN, non compliant with lisinopril 2.5mg at home  -127/78  Continue to monitor for now pt has a hx of HTN, non compliant with lisinopril 2.5mg at home  -127/78  Continue to monitor for now  hold lisinopril for now

## 2021-10-15 NOTE — H&P ADULT - NSHPPHYSICALEXAM_GEN_ALL_CORE
PHYSICAL EXAMINATION:  GENERAL: NAD,   HEAD:  Atraumatic, Normocephalic  EYES:  conjunctiva and sclera clear  NECK: Supple, No JVD, Normal thyroid  CHEST/LUNG: Clear to auscultation. Clear to percussion bilaterally; No rales, rhonchi, wheezing, or rubs  HEART: Regular rate and rhythm; No murmurs, rubs, or gallops  ABDOMEN: Soft, Nontender, Nondistended; Bowel sounds present  NERVOUS SYSTEM:  Alert & Oriented X3,    EXTREMITIES:  2+ Peripheral Pulses, No clubbing, cyanosis, or edema  SKIN: warm dry PHYSICAL EXAMINATION:  GENERAL: NAD,   HEAD:  Atraumatic, Normocephalic  EYES:  conjunctiva and sclera clear  CHEST/LUNG: Clear to auscultation. Clear to percussion bilaterally; No rales, rhonchi, wheezing, or rubs  HEART: Regular rate and rhythm; No murmurs, rubs, or gallops  ABDOMEN: Soft, Nontender, Nondistended; Bowel sounds present  NERVOUS SYSTEM:  Alert & Oriented X3,    EXTREMITIES:  right foot third toe s/p I &D, skin partially removed, yellowish color under 3rd toe, decrease sensation, mild pain to palpation, faint pulses w/ partial darkening 3rd toe   SKIN: warm dry

## 2021-10-15 NOTE — H&P ADULT - PROBLEM SELECTOR PLAN 4
- not compliant with metformin 500mg at home  - will get A1c  -  - diabetic diet  - will start sliding scale - not compliant with metformin 500mg at home  - will get A1c  -  - diabetic diet  - will start sliding scale  - monitor BS and adjust insulin as needed

## 2021-10-15 NOTE — CONSULT NOTE ADULT - SUBJECTIVE AND OBJECTIVE BOX
Patient is a 51y old male who presents with a chief complaint of right foot infection     Podiatry HPI: Podiatry consulted regarding a 50 yo male who presents to the ED for a right foot infection. Pt states that he stepped on a brass staple about 2 weeks ago and had no idea it punctured through the skin until the next morning when he started to feel pain. Pt states that he is diabetic so his sensation in his feet is diminished. Pt reports that he went to the urgent care and was prescribed keflex which he finished. He was also told by the urgent care to soak the foot in epsom salt and apply bacitracin cream on it. Pt reports that soaking it made it worse and turned into a infection a few days ago. Pt is complaining 6/10 pain on the right foot. Admits to having chills. Pt denies constitutional symptoms of N/V/C/F/Sob.     HPI: Patient is a 50 y/o male who presents with worsening right foot pain and wound x2 weeks for which he was prescribed keflex course which he completed and a clindamycin course which he just started. Patient has PMH significant for DM, CHF. Patient reports that a "contractor grade staple" punctured his foot and he didn't realize until the next morning when he couldn't bear weight. He reports he is up to date on his tetanus shot. He reports chills but denies fever as he "did not check it" and ascending leg pain at times. Denies shortness of breath, chest pain or pressure, nausea or vomiting.      PMH: CVA (cerebral vascular accident)    HTN (hypertension)    Anxiety    DM (diabetes mellitus)    Chronic CHF    Allergies: Nuts (Unknown)  tetracycline (Unknown)    Medications:   FH: No pertinent family history    PSX: No pertinent past surgical history    SH: Social History:    Vital Signs Last 24 Hrs  T(C): 36.9 (15 Oct 2021 15:55), Max: 37.6 (15 Oct 2021 11:59)  T(F): 98.4 (15 Oct 2021 15:55), Max: 99.6 (15 Oct 2021 11:59)  HR: 99 (15 Oct 2021 15:55) (99 - 102)  BP: 120/85 (15 Oct 2021 15:55) (120/85 - 127/78)  BP(mean): --  RR: 17 (15 Oct 2021 15:55) (17 - 17)  SpO2: 100% (15 Oct 2021 15:55) (98% - 100%)    LABS                        10.4   12.36 )-----------( 303      ( 15 Oct 2021 13:37 )             30.7                10-15    135  |  100  |  19<H>  ----------------------------<  190<H>  3.8   |  26  |  1.20    Ca    8.9      15 Oct 2021 13:37    TPro  7.7  /  Alb  2.7<L>  /  TBili  0.7  /  DBili  x   /  AST  14  /  ALT  24  /  AlkPhos  68  10-15       WBC Count: 12.36 K/uL (10-15-21 @ 13:37)    PT/INR - ( 15 Oct 2021 13:37 )   PT: 16.1 sec;   INR: 1.37 ratio         PTT - ( 15 Oct 2021 13:37 )  PTT:32.6 sec    CAPILLARY BLOOD GLUCOSE      ROS  REVIEW OF SYSTEMS: Negative unless stated otherwise in the HPI         PHYSICAL EXAM  GEN: CLARIBEL REICH is a pleasant well-nourished, well developed 51y Male in no acute distress, alert awake, and oriented to person, place and time.   LE Focused:    Vasc: DP/PT pulses faintly palpable, CFT < 5 seconds to digits, TG warm to cool, right 3rd toe felt cool to touch, pitting edema present on bilateral legs  Derm: Cellulitis noted on the dorsum of 3rd digit tracking proximally to midfoot, soft tissue integrity of third digit is macerated and loose, discoloration noted to the 3rd toe, submet 2/3 of the right foot is macerated with fluctuance noted plantarly, puncture wound noted to plantar submet 3   Neuro: Protective sensation grossly diminished  MSK: Able to wiggles toes, mild pain on palpation to the third digit

## 2021-10-15 NOTE — H&P ADULT - PROBLEM SELECTOR PLAN 1
Pt presented after noticing increase in swelling in foot  Received Keflex course from urgent care, followed by two days of clindamycin   Has decrease sensation in bilateral feet  Afebrile WBC 12.3  S/P vanc & Zosyn in ED  Xray: follow up official read  podiatry consulted: performed I/D with serosanguinous fluids  WIll get an MRI to rule out abscess and osteomyelitis   Will continue with vancomycin and start cefepime  ID Dr. Mesa was consulted -Pt presented after noticing increase in swelling in foot  -Received Keflex course from urgent care, followed by two days of clindamycin   -s/p I &D by podiatry, skin partially removed, yellowish color under 3rd toe, decrease sensation, mild pain to palpation, faint pulses w/ partial darkening 3rd toe   -Afebrile WBC 12.3  -S/P vanc & Zosyn in ED  -Xray: follow up official read  -WIll get an MRI to rule out abscess and osteomyelitis   -Will continue with vancomycin and start cefepime as pt is diabetic   - f/u [facility]  -ID Dr. Mesa was consulted -Pt presented after noticing increase in swelling in foot  -Received Keflex course from urgent care, followed by two days of clindamycin   -s/p I &D by podiatry, skin partially removed, yellowish color under 3rd toe, decrease sensation, mild pain to palpation, faint pulses w/ partial darkening 3rd toe   -Afebrile WBC 12.3  -S/P vanc & Zosyn in ED  -Xray: follow up official read  -Will get an MRI to rule out abscess and osteomyelitis   -Will continue with vancomycin and start cefepime as pt is diabetic   - f/u MIGDALIA  -ID Dr. Mesa was consulted

## 2021-10-15 NOTE — H&P ADULT - NSHPLABSRESULTS_GEN_ALL_CORE
LABS:                        10.4   12.36 )-----------( 303      ( 15 Oct 2021 13:37 )             30.7     10-15    135  |  100  |  19<H>  ----------------------------<  190<H>  3.8   |  26  |  1.20    Ca    8.9      15 Oct 2021 13:37    TPro  7.7  /  Alb  2.7<L>  /  TBili  0.7  /  DBili  x   /  AST  14  /  ALT  24  /  AlkPhos  68  10-15    PT/INR - ( 15 Oct 2021 13:37 )   PT: 16.1 sec;   INR: 1.37 ratio         PTT - ( 15 Oct 2021 13:37 )  PTT:32.6 sec    LIVER FUNCTIONS - ( 15 Oct 2021 13:37 )  Alb: 2.7 g/dL / Pro: 7.7 g/dL / ALK PHOS: 68 U/L / ALT: 24 U/L DA / AST: 14 U/L / GGT: x           Lactate, Blood: 1.4 mmol/L (10-15-21 @ 13:35)

## 2021-10-15 NOTE — ED PROVIDER NOTE - ATTENDING CONTRIBUTION TO CARE
pt with right foot infection   after stepping on staple redness , fluctuance    requires IV abx   podiatry eval  VSS

## 2021-10-15 NOTE — ED PROVIDER NOTE - CLINICAL SUMMARY MEDICAL DECISION MAKING FREE TEXT BOX
pt with right foot infection   after stepping on staple redness , fluctuance    requires IV abx   podiatry eval

## 2021-10-15 NOTE — ED PROVIDER NOTE - NSICDXPASTMEDICALHX_GEN_ALL_CORE_FT
PAST MEDICAL HISTORY:  Anxiety     Chronic CHF     CVA (cerebral vascular accident)     DM (diabetes mellitus)     HTN (hypertension)

## 2021-10-16 DIAGNOSIS — N17.9 ACUTE KIDNEY FAILURE, UNSPECIFIED: ICD-10-CM

## 2021-10-16 LAB
A1C WITH ESTIMATED AVERAGE GLUCOSE RESULT: 7 % — HIGH (ref 4–5.6)
ANION GAP SERPL CALC-SCNC: 9 MMOL/L — SIGNIFICANT CHANGE UP (ref 5–17)
BASOPHILS # BLD AUTO: 0.03 K/UL — SIGNIFICANT CHANGE UP (ref 0–0.2)
BASOPHILS NFR BLD AUTO: 0.2 % — SIGNIFICANT CHANGE UP (ref 0–2)
BUN SERPL-MCNC: 23 MG/DL — HIGH (ref 7–18)
CALCIUM SERPL-MCNC: 8.5 MG/DL — SIGNIFICANT CHANGE UP (ref 8.4–10.5)
CHLORIDE SERPL-SCNC: 99 MMOL/L — SIGNIFICANT CHANGE UP (ref 96–108)
CO2 SERPL-SCNC: 27 MMOL/L — SIGNIFICANT CHANGE UP (ref 22–31)
COVID-19 SPIKE DOMAIN AB INTERP: POSITIVE
COVID-19 SPIKE DOMAIN ANTIBODY RESULT: 40.2 U/ML — HIGH
CREAT SERPL-MCNC: 1.38 MG/DL — HIGH (ref 0.5–1.3)
CRP SERPL-MCNC: 106 MG/L — HIGH
EOSINOPHIL # BLD AUTO: 0 K/UL — SIGNIFICANT CHANGE UP (ref 0–0.5)
EOSINOPHIL NFR BLD AUTO: 0 % — SIGNIFICANT CHANGE UP (ref 0–6)
ESTIMATED AVERAGE GLUCOSE: 154 MG/DL — HIGH (ref 68–114)
GLUCOSE BLDC GLUCOMTR-MCNC: 159 MG/DL — HIGH (ref 70–99)
GLUCOSE BLDC GLUCOMTR-MCNC: 196 MG/DL — HIGH (ref 70–99)
GLUCOSE BLDC GLUCOMTR-MCNC: 234 MG/DL — HIGH (ref 70–99)
GLUCOSE BLDC GLUCOMTR-MCNC: 302 MG/DL — HIGH (ref 70–99)
GLUCOSE SERPL-MCNC: 271 MG/DL — HIGH (ref 70–99)
HCT VFR BLD CALC: 28.6 % — LOW (ref 39–50)
HGB BLD-MCNC: 9.6 G/DL — LOW (ref 13–17)
IMM GRANULOCYTES NFR BLD AUTO: 1.1 % — SIGNIFICANT CHANGE UP (ref 0–1.5)
LYMPHOCYTES # BLD AUTO: 1.13 K/UL — SIGNIFICANT CHANGE UP (ref 1–3.3)
LYMPHOCYTES # BLD AUTO: 7.7 % — LOW (ref 13–44)
MAGNESIUM SERPL-MCNC: 2 MG/DL — SIGNIFICANT CHANGE UP (ref 1.6–2.6)
MCHC RBC-ENTMCNC: 30.3 PG — SIGNIFICANT CHANGE UP (ref 27–34)
MCHC RBC-ENTMCNC: 33.6 GM/DL — SIGNIFICANT CHANGE UP (ref 32–36)
MCV RBC AUTO: 90.2 FL — SIGNIFICANT CHANGE UP (ref 80–100)
MONOCYTES # BLD AUTO: 1.11 K/UL — HIGH (ref 0–0.9)
MONOCYTES NFR BLD AUTO: 7.5 % — SIGNIFICANT CHANGE UP (ref 2–14)
NEUTROPHILS # BLD AUTO: 12.29 K/UL — HIGH (ref 1.8–7.4)
NEUTROPHILS NFR BLD AUTO: 83.5 % — HIGH (ref 43–77)
NRBC # BLD: 0 /100 WBCS — SIGNIFICANT CHANGE UP (ref 0–0)
PHOSPHATE SERPL-MCNC: 3.6 MG/DL — SIGNIFICANT CHANGE UP (ref 2.5–4.5)
PLATELET # BLD AUTO: 292 K/UL — SIGNIFICANT CHANGE UP (ref 150–400)
POTASSIUM SERPL-MCNC: 3.8 MMOL/L — SIGNIFICANT CHANGE UP (ref 3.5–5.3)
POTASSIUM SERPL-SCNC: 3.8 MMOL/L — SIGNIFICANT CHANGE UP (ref 3.5–5.3)
RBC # BLD: 3.17 M/UL — LOW (ref 4.2–5.8)
RBC # FLD: 11.9 % — SIGNIFICANT CHANGE UP (ref 10.3–14.5)
SARS-COV-2 IGG+IGM SERPL QL IA: 40.2 U/ML — HIGH
SARS-COV-2 IGG+IGM SERPL QL IA: POSITIVE
SODIUM SERPL-SCNC: 135 MMOL/L — SIGNIFICANT CHANGE UP (ref 135–145)
WBC # BLD: 14.72 K/UL — HIGH (ref 3.8–10.5)
WBC # FLD AUTO: 14.72 K/UL — HIGH (ref 3.8–10.5)

## 2021-10-16 PROCEDURE — 99233 SBSQ HOSP IP/OBS HIGH 50: CPT | Mod: GC

## 2021-10-16 PROCEDURE — 93923 UPR/LXTR ART STDY 3+ LVLS: CPT | Mod: 26

## 2021-10-16 RX ORDER — TRAMADOL HYDROCHLORIDE 50 MG/1
25 TABLET ORAL ONCE
Refills: 0 | Status: DISCONTINUED | OUTPATIENT
Start: 2021-10-16 | End: 2021-10-16

## 2021-10-16 RX ORDER — INFLUENZA VIRUS VACCINE 15; 15; 15; 15 UG/.5ML; UG/.5ML; UG/.5ML; UG/.5ML
0.5 SUSPENSION INTRAMUSCULAR ONCE
Refills: 0 | Status: COMPLETED | OUTPATIENT
Start: 2021-10-16 | End: 2021-10-16

## 2021-10-16 RX ADMIN — Medication 300 MILLIGRAM(S): at 17:45

## 2021-10-16 RX ADMIN — Medication 300 MILLIGRAM(S): at 05:10

## 2021-10-16 RX ADMIN — Medication 4: at 08:16

## 2021-10-16 RX ADMIN — Medication 1: at 12:30

## 2021-10-16 RX ADMIN — Medication 650 MILLIGRAM(S): at 21:28

## 2021-10-16 RX ADMIN — Medication 650 MILLIGRAM(S): at 08:21

## 2021-10-16 RX ADMIN — CEFEPIME 100 MILLIGRAM(S): 1 INJECTION, POWDER, FOR SOLUTION INTRAMUSCULAR; INTRAVENOUS at 05:10

## 2021-10-16 RX ADMIN — Medication 650 MILLIGRAM(S): at 21:58

## 2021-10-16 RX ADMIN — Medication 650 MILLIGRAM(S): at 09:00

## 2021-10-16 RX ADMIN — TRAMADOL HYDROCHLORIDE 25 MILLIGRAM(S): 50 TABLET ORAL at 13:34

## 2021-10-16 RX ADMIN — TRAMADOL HYDROCHLORIDE 25 MILLIGRAM(S): 50 TABLET ORAL at 12:34

## 2021-10-16 RX ADMIN — CEFEPIME 100 MILLIGRAM(S): 1 INJECTION, POWDER, FOR SOLUTION INTRAMUSCULAR; INTRAVENOUS at 17:09

## 2021-10-16 RX ADMIN — Medication 81 MILLIGRAM(S): at 13:32

## 2021-10-16 NOTE — CONSULT NOTE ADULT - SUBJECTIVE AND OBJECTIVE BOX
HPI:  51 year old male with PMH CVA (10 years ago, no deficits), HTN, DM, CHF (EF 30-35% 2/21), presented with right foot swelling. Patient stated two weeks ago he noticed bleeding in his shoes and found a staple stuck to the bottom of his feet. He didn't notice it before because he states he doesn't have any sensation in his feet. He went to urgent care, where they prescribed him Keflex Q8hrs. Patient states the foot started to swell more so he returned to urgent care yesterday where they gave him a prescription of clindamycin. Patient this morning started to endorse chills, nausea, but denies any fever, vomiting, chest pain, SOB, abdominal pain, or changes in bowel habits.  Patient was admitted in February due to covid, and was found to have CHF and DM. Patient has not been complaint with any of his medications and states he just takes Lasix and Xanax.     In the ED:  Afebrile, 120-127/78  HR   98% RA  WBC 12.3, Hb 10.4, PT 16.1, INR 1.37  Lactate WNL   CXR: no focal infiltrates or consolidation   XRAY Right foot: pending  Given Vanc and Ronan   Podiatry performed I/D with sanguinous drainage upon manual manipulation  (15 Oct 2021 17:58)      PAST MEDICAL & SURGICAL HISTORY:  CVA (cerebral vascular accident)    HTN (hypertension)    Anxiety    DM (diabetes mellitus)    Chronic CHF        Nuts (Unknown)  tetracycline (Hives)      Meds:  acetaminophen   Tablet .. 650 milliGRAM(s) Oral every 6 hours PRN  ALPRAZolam 0.5 milliGRAM(s) Oral two times a day PRN  aspirin enteric coated 81 milliGRAM(s) Oral daily  atorvastatin 80 milliGRAM(s) Oral at bedtime  carvedilol 3.125 milliGRAM(s) Oral every 12 hours  cefepime   IVPB 2000 milliGRAM(s) IV Intermittent every 12 hours  enoxaparin Injectable 40 milliGRAM(s) SubCutaneous daily  furosemide    Tablet 20 milliGRAM(s) Oral daily  influenza   Vaccine 0.5 milliLiter(s) IntraMuscular once  insulin lispro (ADMELOG) corrective regimen sliding scale   SubCutaneous Before meals and at bedtime  ondansetron Injectable 4 milliGRAM(s) IV Push every 6 hours PRN  oxycodone    5 mG/acetaminophen 325 mG 2 Tablet(s) Oral once  vancomycin  IVPB 1500 milliGRAM(s) IV Intermittent every 12 hours      SOCIAL HISTORY:  Smoker:  YES / NO        PACK YEARS:                         WHEN QUIT?  ETOH use:  YES / NO               FREQUENCY / QUANTITY:  Ilicit Drug use:  YES / NO  Occupation:  Assisted device use (Cane / Walker):  Live with:    FAMILY HISTORY:      VITALS:  Vital Signs Last 24 Hrs  T(C): 36.4 (16 Oct 2021 14:40), Max: 39.4 (15 Oct 2021 22:25)  T(F): 97.5 (16 Oct 2021 14:40), Max: 103 (15 Oct 2021 22:25)  HR: 91 (16 Oct 2021 14:40) (83 - 122)  BP: 108/73 (16 Oct 2021 14:40) (97/63 - 124/76)  BP(mean): --  RR: 18 (16 Oct 2021 14:40) (16 - 18)  SpO2: 100% (16 Oct 2021 14:40) (96% - 100%)    LABS/DIAGNOSTIC TESTS:                          9.6    14.72 )-----------( 292      ( 16 Oct 2021 07:26 )             28.6     WBC Count: 14.72 K/uL (10-16 @ 07:26)  WBC Count: 12.36 K/uL (10-15 @ 13:37)      10-16    135  |  99  |  23<H>  ----------------------------<  271<H>  3.8   |  27  |  1.38<H>    Ca    8.5      16 Oct 2021 07:26  Phos  3.6     10-16  Mg     2.0     10-16    TPro  7.7  /  Alb  2.7<L>  /  TBili  0.7  /  DBili  x   /  AST  14  /  ALT  24  /  AlkPhos  68  10-15          LIVER FUNCTIONS - ( 15 Oct 2021 13:37 )  Alb: 2.7 g/dL / Pro: 7.7 g/dL / ALK PHOS: 68 U/L / ALT: 24 U/L DA / AST: 14 U/L / GGT: x             PT/INR - ( 15 Oct 2021 13:37 )   PT: 16.1 sec;   INR: 1.37 ratio         PTT - ( 15 Oct 2021 13:37 )  PTT:32.6 sec    LACTATE:    ABG -     CULTURES:       RADIOLOGY:< from: Xray Foot AP + Lateral, Right (10.15.21 @ 15:36) >  EXAM:  XR FOOT 2 VIEWS RT                            PROCEDURE DATE:  10/15/2021          INTERPRETATION:  Cellulitis swollen right foot.    3 views right foot.    No fracture or dislocation. No focal bone lysis or unusual periosteal reaction. Joint spaces preserved. Small calcaneal osteophytes. Vascular calcification. Soft tissue swelling mostly over the dorsum of the foot may reflect cellulitis. No soft tissue gas.    IMPRESSION: No acute or destructive osseous pathology.    If there is clinical suspicion of osteomyelitis consider bone scan or MRI    --- End of Report ---            RYANNE REESE MD; Attending Radiologist  This document has been electronically signed. Oct 16 2021  9:43AM    < end of copied text >  --------------------------------------------------------------------------------------------------------------------------------------------------------------------------------------------------------------  EXAM:  XR CHEST PORTABLE IMMED 1V                            PROCEDURE DATE:  10/15/2021          INTERPRETATION:  INDICATION: cellulitis    PRIORS: 2/5/2021.    VIEWS: Portable AP radiography of the chest performed.    FINDINGS: Heart size appearswithin normal limits. No hilar or superior mediastinal abnormalities are identified. There is no evidence for focal infiltrate, lobar consolidation or pulmonary edema. No pleural effusion or pneumothorax is demonstrated. No mediastinal shift is noted. The visualized osseous structures appear unremarkable.    IMPRESSION: No evidence for focal infiltrate or lobar consolidation.    --- End of Report ---            PERLA PIERCE MD; Attending Radiologist  This document has been electronically signed.Oct 15 2021  3:52PM    < end of copied text >        ROS  [  ] UNABLE TO ELICIT               HPI:  51 year old male with PMH CVA (10 years ago, no deficits), HTN, DM, CHF (EF 30-35% 2/21), presented with right foot swelling. Patient stated two weeks ago he noticed bleeding in his shoes and found a staple stuck to the bottom of his feet. He didn't notice it before because he states he doesn't have any sensation in his feet. He went to urgent care, where they prescribed him Keflex Q8hrs. Patient states the foot started to swell more so he returned to urgent care yesterday where they gave him a prescription of clindamycin. Patient this morning started to endorse chills, nausea, but denies any fever, vomiting, chest pain, SOB, abdominal pain, or changes in bowel habits.  Patient was admitted in February due to covid, and was found to have CHF and DM. Patient has not been complaint with any of his medications and states he just takes Lasix and Xanax.     In the ED:  Afebrile, 120-127/78  HR   98% RA  WBC 12.3, Hb 10.4, PT 16.1, INR 1.37  Lactate WNL   CXR: no focal infiltrates or consolidation   XRAY Right foot: pending  Given Vanc and Ronan   Podiatry performed I/D with sanguinous drainage upon manual manipulation  (15 Oct 2021 17:58)        History as above, pt who is non compliant with his meds (diabetes), presents to the hospital with right foot swelling and right leg swelling and was seen in urgent care in the outpatient setting and prescribed abxs without any benefit, he states that he found a large staple that had penetrated his shoe and foot at the junction of his 2nd and 3rd toes on the plantar aspect of foot through his shoe, he has neuropathy and so did not feel any pain. He was found to have a fever of 103 here ( did not check it at home), had chills at home. No other complaints. His foot X-ray does not show any evidence of Osteo but he is awaiting an MRI to r/o both Osteo and abscess.      PAST MEDICAL & SURGICAL HISTORY:  CVA (cerebral vascular accident)    HTN (hypertension)    Anxiety    DM (diabetes mellitus)    Chronic CHF        Nuts (Unknown)  tetracycline (Hives)      Meds:  acetaminophen   Tablet .. 650 milliGRAM(s) Oral every 6 hours PRN  ALPRAZolam 0.5 milliGRAM(s) Oral two times a day PRN  aspirin enteric coated 81 milliGRAM(s) Oral daily  atorvastatin 80 milliGRAM(s) Oral at bedtime  carvedilol 3.125 milliGRAM(s) Oral every 12 hours  cefepime   IVPB 2000 milliGRAM(s) IV Intermittent every 12 hours  enoxaparin Injectable 40 milliGRAM(s) SubCutaneous daily  furosemide    Tablet 20 milliGRAM(s) Oral daily  influenza   Vaccine 0.5 milliLiter(s) IntraMuscular once  insulin lispro (ADMELOG) corrective regimen sliding scale   SubCutaneous Before meals and at bedtime  ondansetron Injectable 4 milliGRAM(s) IV Push every 6 hours PRN  oxycodone    5 mG/acetaminophen 325 mG 2 Tablet(s) Oral once  vancomycin  IVPB 1500 milliGRAM(s) IV Intermittent every 12 hours      SOCIAL HISTORY:  Smoker:  YES / NO        PACK YEARS:                         WHEN QUIT?  ETOH use:  YES / NO               FREQUENCY / QUANTITY:  Ilicit Drug use:  YES / NO  Occupation:  Assisted device use (Cane / Walker):  Live with:    FAMILY HISTORY:      VITALS:  Vital Signs Last 24 Hrs  T(C): 36.4 (16 Oct 2021 14:40), Max: 39.4 (15 Oct 2021 22:25)  T(F): 97.5 (16 Oct 2021 14:40), Max: 103 (15 Oct 2021 22:25)  HR: 91 (16 Oct 2021 14:40) (83 - 122)  BP: 108/73 (16 Oct 2021 14:40) (97/63 - 124/76)  BP(mean): --  RR: 18 (16 Oct 2021 14:40) (16 - 18)  SpO2: 100% (16 Oct 2021 14:40) (96% - 100%)    LABS/DIAGNOSTIC TESTS:                          9.6    14.72 )-----------( 292      ( 16 Oct 2021 07:26 )             28.6     WBC Count: 14.72 K/uL (10-16 @ 07:26)  WBC Count: 12.36 K/uL (10-15 @ 13:37)      10-16    135  |  99  |  23<H>  ----------------------------<  271<H>  3.8   |  27  |  1.38<H>    Ca    8.5      16 Oct 2021 07:26  Phos  3.6     10-16  Mg     2.0     10-16    TPro  7.7  /  Alb  2.7<L>  /  TBili  0.7  /  DBili  x   /  AST  14  /  ALT  24  /  AlkPhos  68  10-15          LIVER FUNCTIONS - ( 15 Oct 2021 13:37 )  Alb: 2.7 g/dL / Pro: 7.7 g/dL / ALK PHOS: 68 U/L / ALT: 24 U/L DA / AST: 14 U/L / GGT: x             PT/INR - ( 15 Oct 2021 13:37 )   PT: 16.1 sec;   INR: 1.37 ratio         PTT - ( 15 Oct 2021 13:37 )  PTT:32.6 sec    LACTATE:    ABG -     CULTURES:       RADIOLOGY:< from: Xray Foot AP + Lateral, Right (10.15.21 @ 15:36) >  EXAM:  XR FOOT 2 VIEWS RT                            PROCEDURE DATE:  10/15/2021          INTERPRETATION:  Cellulitis swollen right foot.    3 views right foot.    No fracture or dislocation. No focal bone lysis or unusual periosteal reaction. Joint spaces preserved. Small calcaneal osteophytes. Vascular calcification. Soft tissue swelling mostly over the dorsum of the foot may reflect cellulitis. No soft tissue gas.    IMPRESSION: No acute or destructive osseous pathology.    If there is clinical suspicion of osteomyelitis consider bone scan or MRI    --- End of Report ---            RYANNE REESE MD; Attending Radiologist  This document has been electronically signed. Oct 16 2021  9:43AM    < end of copied text >  --------------------------------------------------------------------------------------------------------------------------------------------------------------------------------------------------------------  EXAM:  XR CHEST PORTABLE IMMED 1V                            PROCEDURE DATE:  10/15/2021          INTERPRETATION:  INDICATION: cellulitis    PRIORS: 2/5/2021.    VIEWS: Portable AP radiography of the chest performed.    FINDINGS: Heart size appearswithin normal limits. No hilar or superior mediastinal abnormalities are identified. There is no evidence for focal infiltrate, lobar consolidation or pulmonary edema. No pleural effusion or pneumothorax is demonstrated. No mediastinal shift is noted. The visualized osseous structures appear unremarkable.    IMPRESSION: No evidence for focal infiltrate or lobar consolidation.    --- End of Report ---            PERLA PIERCE MD; Attending Radiologist  This document has been electronically signed.Oct 15 2021  3:52PM    < end of copied text >        ROS  [  ] UNABLE TO ELICIT               HPI:  51 year old male with PMH CVA (10 years ago, no deficits), HTN, DM, CHF (EF 30-35% 2/21), presented with right foot swelling. Patient stated two weeks ago he noticed bleeding in his shoes and found a staple stuck to the bottom of his feet. He didn't notice it before because he states he doesn't have any sensation in his feet. He went to urgent care, where they prescribed him Keflex Q8hrs. Patient states the foot started to swell more so he returned to urgent care yesterday where they gave him a prescription of clindamycin. Patient this morning started to endorse chills, nausea, but denies any fever, vomiting, chest pain, SOB, abdominal pain, or changes in bowel habits.  Patient was admitted in February due to covid, and was found to have CHF and DM. Patient has not been complaint with any of his medications and states he just takes Lasix and Xanax.     In the ED:  Afebrile, 120-127/78  HR   98% RA  WBC 12.3, Hb 10.4, PT 16.1, INR 1.37  Lactate WNL   CXR: no focal infiltrates or consolidation   XRAY Right foot: pending  Given Vanc and Ronan   Podiatry performed I/D with sanguinous drainage upon manual manipulation  (15 Oct 2021 17:58)        History as above, pt who is non compliant with his meds (diabetes), presents to the hospital with right foot swelling and right leg swelling and was seen in urgent care in the outpatient setting and prescribed abxs without any benefit, he states that he found a large staple that had penetrated his shoe and foot at the junction of his 2nd and 3rd toes on the plantar aspect of foot through his shoe, he has neuropathy and so did not feel any pain. He was found to have a fever of 103 here ( did not check it at home), had chills at home. No other complaints. His foot X-ray does not show any evidence of Osteo but he is awaiting an MRI to r/o both Osteo and abscess.      PAST MEDICAL & SURGICAL HISTORY:  CVA (cerebral vascular accident)    HTN (hypertension)    Anxiety    DM (diabetes mellitus)    Chronic CHF        Nuts (Unknown)  tetracycline (Hives)      Meds:  acetaminophen   Tablet .. 650 milliGRAM(s) Oral every 6 hours PRN  ALPRAZolam 0.5 milliGRAM(s) Oral two times a day PRN  aspirin enteric coated 81 milliGRAM(s) Oral daily  atorvastatin 80 milliGRAM(s) Oral at bedtime  carvedilol 3.125 milliGRAM(s) Oral every 12 hours  cefepime   IVPB 2000 milliGRAM(s) IV Intermittent every 12 hours  enoxaparin Injectable 40 milliGRAM(s) SubCutaneous daily  furosemide    Tablet 20 milliGRAM(s) Oral daily  influenza   Vaccine 0.5 milliLiter(s) IntraMuscular once  insulin lispro (ADMELOG) corrective regimen sliding scale   SubCutaneous Before meals and at bedtime  ondansetron Injectable 4 milliGRAM(s) IV Push every 6 hours PRN  oxycodone    5 mG/acetaminophen 325 mG 2 Tablet(s) Oral once  vancomycin  IVPB 1500 milliGRAM(s) IV Intermittent every 12 hours      SOCIAL HISTORY:  Smoker:  no  ETOH use:  no    FAMILY HISTORY: diabetes      VITALS:  Vital Signs Last 24 Hrs  T(C): 36.4 (16 Oct 2021 14:40), Max: 39.4 (15 Oct 2021 22:25)  T(F): 97.5 (16 Oct 2021 14:40), Max: 103 (15 Oct 2021 22:25)  HR: 91 (16 Oct 2021 14:40) (83 - 122)  BP: 108/73 (16 Oct 2021 14:40) (97/63 - 124/76)  BP(mean): --  RR: 18 (16 Oct 2021 14:40) (16 - 18)  SpO2: 100% (16 Oct 2021 14:40) (96% - 100%)    LABS/DIAGNOSTIC TESTS:                          9.6    14.72 )-----------( 292      ( 16 Oct 2021 07:26 )             28.6     WBC Count: 14.72 K/uL (10-16 @ 07:26)  WBC Count: 12.36 K/uL (10-15 @ 13:37)      10-16    135  |  99  |  23<H>  ----------------------------<  271<H>  3.8   |  27  |  1.38<H>    Ca    8.5      16 Oct 2021 07:26  Phos  3.6     10-16  Mg     2.0     10-16    TPro  7.7  /  Alb  2.7<L>  /  TBili  0.7  /  DBili  x   /  AST  14  /  ALT  24  /  AlkPhos  68  10-15          LIVER FUNCTIONS - ( 15 Oct 2021 13:37 )  Alb: 2.7 g/dL / Pro: 7.7 g/dL / ALK PHOS: 68 U/L / ALT: 24 U/L DA / AST: 14 U/L / GGT: x             PT/INR - ( 15 Oct 2021 13:37 )   PT: 16.1 sec;   INR: 1.37 ratio         PTT - ( 15 Oct 2021 13:37 )  PTT:32.6 sec    LACTATE:    ABG -     CULTURES:       RADIOLOGY:< from: Xray Foot AP + Lateral, Right (10.15.21 @ 15:36) >  EXAM:  XR FOOT 2 VIEWS RT                            PROCEDURE DATE:  10/15/2021          INTERPRETATION:  Cellulitis swollen right foot.    3 views right foot.    No fracture or dislocation. No focal bone lysis or unusual periosteal reaction. Joint spaces preserved. Small calcaneal osteophytes. Vascular calcification. Soft tissue swelling mostly over the dorsum of the foot may reflect cellulitis. No soft tissue gas.    IMPRESSION: No acute or destructive osseous pathology.    If there is clinical suspicion of osteomyelitis consider bone scan or MRI    --- End of Report ---            RYANNE REESE MD; Attending Radiologist  This document has been electronically signed. Oct 16 2021  9:43AM    < end of copied text >  --------------------------------------------------------------------------------------------------------------------------------------------------------------------------------------------------------------  EXAM:  XR CHEST PORTABLE IMMED 1V                            PROCEDURE DATE:  10/15/2021          INTERPRETATION:  INDICATION: cellulitis    PRIORS: 2/5/2021.    VIEWS: Portable AP radiography of the chest performed.    FINDINGS: Heart size appearswithin normal limits. No hilar or superior mediastinal abnormalities are identified. There is no evidence for focal infiltrate, lobar consolidation or pulmonary edema. No pleural effusion or pneumothorax is demonstrated. No mediastinal shift is noted. The visualized osseous structures appear unremarkable.    IMPRESSION: No evidence for focal infiltrate or lobar consolidation.    --- End of Report ---            PERLA PIERCE MD; Attending Radiologist  This document has been electronically signed.Oct 15 2021  3:52PM    < end of copied text >        ROS  [  ] UNABLE TO ELICIT

## 2021-10-16 NOTE — CONSULT NOTE ADULT - SKIN COMMENTS
right leg and foot swelling ++ , right leg warmth + but no erythema, right foot dressed by podiatry just 1-2 hrs ago and so not removed

## 2021-10-16 NOTE — PROGRESS NOTE ADULT - SUBJECTIVE AND OBJECTIVE BOX
Podiatry Interval HPI:      Podiatry HPI: Podiatry consulted regarding a 52 yo male who presents to the ED for a right foot infection. Pt states that he stepped on a brass staple about 2 weeks ago and had no idea it punctured through the skin until the next morning when he started to feel pain. Pt states that he is diabetic so his sensation in his feet is diminished. Pt reports that he went to the urgent care and was prescribed keflex which he finished. He was also told by the urgent care to soak the foot in epsom salt and apply bacitracin cream on it. Pt reports that soaking it made it worse and turned into a infection a few days ago. Pt is complaining 6/10 pain on the right foot. Admits to having chills. Pt denies constitutional symptoms of N/V/C/F/Sob.     HPI: Patient is a 50 y/o male who presents with worsening right foot pain and wound x2 weeks for which he was prescribed keflex course which he completed and a clindamycin course which he just started. Patient has PMH significant for DM, CHF. Patient reports that a "contractor grade staple" punctured his foot and he didn't realize until the next morning when he couldn't bear weight. He reports he is up to date on his tetanus shot. He reports chills but denies fever as he "did not check it" and ascending leg pain at times. Denies shortness of breath, chest pain or pressure, nausea or vomiting.      Medications acetaminophen   Tablet .. 650 milliGRAM(s) Oral every 6 hours PRN  ALPRAZolam 0.5 milliGRAM(s) Oral two times a day PRN  aspirin enteric coated 81 milliGRAM(s) Oral daily  atorvastatin 80 milliGRAM(s) Oral at bedtime  carvedilol 3.125 milliGRAM(s) Oral every 12 hours  cefepime   IVPB 2000 milliGRAM(s) IV Intermittent every 12 hours  enoxaparin Injectable 40 milliGRAM(s) SubCutaneous daily  furosemide    Tablet 20 milliGRAM(s) Oral daily  influenza   Vaccine 0.5 milliLiter(s) IntraMuscular once  insulin lispro (ADMELOG) corrective regimen sliding scale   SubCutaneous Before meals and at bedtime  ondansetron Injectable 4 milliGRAM(s) IV Push every 6 hours PRN  oxycodone    5 mG/acetaminophen 325 mG 2 Tablet(s) Oral once  vancomycin  IVPB 1500 milliGRAM(s) IV Intermittent every 12 hours    FHNo pertinent family history    ,   PMHCVA (cerebral vascular accident)    HTN (hypertension)    Anxiety    DM (diabetes mellitus)    Chronic CHF       PSHNo pertinent past surgical history        Labs                          9.6    14.72 )-----------( 292      ( 16 Oct 2021 07:26 )             28.6      10-16    135  |  99  |  23<H>  ----------------------------<  271<H>  3.8   |  27  |  1.38<H>    Ca    8.5      16 Oct 2021 07:26  Phos  3.6     10-16  Mg     2.0     10-16    TPro  7.7  /  Alb  2.7<L>  /  TBili  0.7  /  DBili  x   /  AST  14  /  ALT  24  /  AlkPhos  68  10-15     Vital Signs Last 24 Hrs  T(C): 36.2 (16 Oct 2021 05:12), Max: 39.4 (15 Oct 2021 22:25)  T(F): 97.2 (16 Oct 2021 05:12), Max: 103 (15 Oct 2021 22:25)  HR: 83 (16 Oct 2021 05:12) (83 - 122)  BP: 102/70 (16 Oct 2021 05:12) (97/63 - 127/78)  BP(mean): --  RR: 18 (16 Oct 2021 05:12) (16 - 18)  SpO2: 100% (16 Oct 2021 05:12) (96% - 100%)  Sedimentation Rate, Erythrocyte: 99 mm/Hr (10-15-21 @ 13:37)         C-Reactive Protein, Serum: 106 mg/L (10-16-21 @ 01:09)   WBC Count: 14.72 K/uL *H* (10-16-21 @ 07:26)  WBC Count: 12.36 K/uL *H* (10-15-21 @ 13:37)        PHYSICAL EXAM  GEN: CLARIBEL REICH is a pleasant well-nourished, well developed 51y Male in no acute distress, alert awake, and oriented to person, place and time.   LE Focused:    Vasc: DP/PT pulses faintly palpable, CFT < 5 seconds to digits, TG warm to cool, right 3rd toe felt cool to touch, pitting edema present on bilateral legs  Derm: Cellulitis noted on the dorsum of 3rd digit tracking proximally to midfoot, soft tissue integrity of third digit is macerated and loose, discoloration noted to the 3rd toe, submet 2/3 of the right foot is macerated with fluctuance noted plantarly, puncture wound noted to plantar submet 3   Neuro: Protective sensation grossly diminished  MSK: Able to wiggles toes, mild pain on palpation to the third digit                  Podiatry Interval HPI: Pt seen bedside resting comfortably. Pt AAOx3. Pt endorses severe pain to R foot. States he had fever and chills overnight. Denies walking on R foot. Denies N/V/SOB. No other pedal complaints.        Podiatry HPI: Podiatry consulted regarding a 52 yo male who presents to the ED for a right foot infection. Pt states that he stepped on a brass staple about 2 weeks ago and had no idea it punctured through the skin until the next morning when he started to feel pain. Pt states that he is diabetic so his sensation in his feet is diminished. Pt reports that he went to the urgent care and was prescribed keflex which he finished. He was also told by the urgent care to soak the foot in epsom salt and apply bacitracin cream on it. Pt reports that soaking it made it worse and turned into a infection a few days ago. Pt is complaining 6/10 pain on the right foot. Admits to having chills. Pt denies constitutional symptoms of N/V/C/F/Sob.     HPI: Patient is a 52 y/o male who presents with worsening right foot pain and wound x2 weeks for which he was prescribed keflex course which he completed and a clindamycin course which he just started. Patient has PMH significant for DM, CHF. Patient reports that a "contractor grade staple" punctured his foot and he didn't realize until the next morning when he couldn't bear weight. He reports he is up to date on his tetanus shot. He reports chills but denies fever as he "did not check it" and ascending leg pain at times. Denies shortness of breath, chest pain or pressure, nausea or vomiting.      Medications acetaminophen   Tablet .. 650 milliGRAM(s) Oral every 6 hours PRN  ALPRAZolam 0.5 milliGRAM(s) Oral two times a day PRN  aspirin enteric coated 81 milliGRAM(s) Oral daily  atorvastatin 80 milliGRAM(s) Oral at bedtime  carvedilol 3.125 milliGRAM(s) Oral every 12 hours  cefepime   IVPB 2000 milliGRAM(s) IV Intermittent every 12 hours  enoxaparin Injectable 40 milliGRAM(s) SubCutaneous daily  furosemide    Tablet 20 milliGRAM(s) Oral daily  influenza   Vaccine 0.5 milliLiter(s) IntraMuscular once  insulin lispro (ADMELOG) corrective regimen sliding scale   SubCutaneous Before meals and at bedtime  ondansetron Injectable 4 milliGRAM(s) IV Push every 6 hours PRN  oxycodone    5 mG/acetaminophen 325 mG 2 Tablet(s) Oral once  vancomycin  IVPB 1500 milliGRAM(s) IV Intermittent every 12 hours    FHNo pertinent family history    ,   PMHCVA (cerebral vascular accident)    HTN (hypertension)    Anxiety    DM (diabetes mellitus)    Chronic CHF       PSHNo pertinent past surgical history        Labs                          9.6    14.72 )-----------( 292      ( 16 Oct 2021 07:26 )             28.6      10-16    135  |  99  |  23<H>  ----------------------------<  271<H>  3.8   |  27  |  1.38<H>    Ca    8.5      16 Oct 2021 07:26  Phos  3.6     10-16  Mg     2.0     10-16    TPro  7.7  /  Alb  2.7<L>  /  TBili  0.7  /  DBili  x   /  AST  14  /  ALT  24  /  AlkPhos  68  10-15     Vital Signs Last 24 Hrs  T(C): 36.2 (16 Oct 2021 05:12), Max: 39.4 (15 Oct 2021 22:25)  T(F): 97.2 (16 Oct 2021 05:12), Max: 103 (15 Oct 2021 22:25)  HR: 83 (16 Oct 2021 05:12) (83 - 122)  BP: 102/70 (16 Oct 2021 05:12) (97/63 - 127/78)  BP(mean): --  RR: 18 (16 Oct 2021 05:12) (16 - 18)  SpO2: 100% (16 Oct 2021 05:12) (96% - 100%)  Sedimentation Rate, Erythrocyte: 99 mm/Hr (10-15-21 @ 13:37)         C-Reactive Protein, Serum: 106 mg/L (10-16-21 @ 01:09)   WBC Count: 14.72 K/uL *H* (10-16-21 @ 07:26)  WBC Count: 12.36 K/uL *H* (10-15-21 @ 13:37)        PHYSICAL EXAM  GEN: REICH, CLARIBEL is a pleasant well-nourished, well developed 51y Male in no acute distress, alert awake, and oriented to person, place and time.   LE Focused:    Vasc: DP/PT pulses faintly palpable, CFT < 5 seconds to digits, TG warm to cool, right 3rd toe felt cool to touch, pitting edema present on bilateral legs  Derm: Cellulitis noted on the dorsum of 3rd digit tracking proximally to midfoot, soft tissue integrity of third digit is macerated and loose, discoloration noted to the 3rd toe, submet 2/3 of the right foot is macerated with fluctuance noted plantarly, puncture wound noted to plantar submet 3   Neuro: Protective sensation grossly diminished  MSK: Able to wiggles toes, mild pain on palpation to the third digit

## 2021-10-16 NOTE — PROGRESS NOTE ADULT - ASSESSMENT
A:  Right foot infection  Cellulitis    P:  Pt seen and evaluated  Discussed clinical findings with the patient  X ray reviewed   Flushed the area copiously with a mixture of betadine and saline   Applied iodoform packing to the incision site and applied DSD to the right foot  Pt tolerated the procedure well  Pod plan: Recommend MRI of the right foot to r/o abscess, continue to monitor cellulitis   MIGDALIA/PVR ordered  Will follow while in house  Discussed with attending Dr. Kitchen    A:  Right foot infection  Cellulitis    P:  Pt seen and evaluated  Discussed clinical findings with the patient  X ray reviewed   Pending culture  Pending MIGDALIA/PVR  Evaluated wound R foot  Flushed R foot with copious amounts of mixture of betadine and saline   Applied iodoform packing to the incision site and applied DSD to the right foot  Pt tolerated the procedure well  Recommend MRI of the right foot to r/o abscess, continue to monitor cellulitis   Will follow while in house  Discussed with attending Dr. Kitchen    A:  Right foot infection  Cellulitis    P:  Pt seen and evaluated  Discussed clinical findings with the patient  X ray reviewed   MIGDALIA/PVR reviewed  Pending culture  Evaluated wound R foot  Flushed R foot with copious amounts of mixture of betadine and saline   Applied betadine, DSD to R plantar foot  Recommend MRI of the right foot to r/o abscess, continue to monitor cellulitis   Pt to keep dressing clean, dry and intact  Pt to heel wb in surgical shoe to R foot  Will follow while in house  Recommend Vasc consult  Recommend ID consult  Possible surgical intervention to R 3rd digit  Requesting medical optimization  Discussed with attending Dr. Kitchen

## 2021-10-16 NOTE — PROGRESS NOTE ADULT - SUBJECTIVE AND OBJECTIVE BOX
Patient is a 51y old  Male who presents with a chief complaint of Cellulitis (16 Oct 2021 16:06)    Patient was seen and examined at bedside   Complains of mild pain in foot, denies any other complains     INTERVAL HPI/OVERNIGHT EVENTS:  T(C): 36.4 (10-16-21 @ 14:40), Max: 39.4 (10-15-21 @ 22:25)  HR: 91 (10-16-21 @ 14:40) (83 - 122)  BP: 108/73 (10-16-21 @ 14:40) (97/63 - 124/76)  RR: 18 (10-16-21 @ 14:40) (16 - 18)  SpO2: 100% (10-16-21 @ 14:40) (96% - 100%)  Wt(kg): --  I&O's Summary      REVIEW OF SYSTEMS: denies fever, chills, SOB, palpitations, chest pain, abdominal pain, nausea, vomiting, diarrhea, constipation, dizziness    MEDICATIONS  (STANDING):  aspirin enteric coated 81 milliGRAM(s) Oral daily  atorvastatin 80 milliGRAM(s) Oral at bedtime  carvedilol 3.125 milliGRAM(s) Oral every 12 hours  cefepime   IVPB 2000 milliGRAM(s) IV Intermittent every 12 hours  enoxaparin Injectable 40 milliGRAM(s) SubCutaneous daily  furosemide    Tablet 20 milliGRAM(s) Oral daily  influenza   Vaccine 0.5 milliLiter(s) IntraMuscular once  insulin lispro (ADMELOG) corrective regimen sliding scale   SubCutaneous Before meals and at bedtime  oxycodone    5 mG/acetaminophen 325 mG 2 Tablet(s) Oral once  vancomycin  IVPB 1500 milliGRAM(s) IV Intermittent every 12 hours    MEDICATIONS  (PRN):  acetaminophen   Tablet .. 650 milliGRAM(s) Oral every 6 hours PRN Temp greater or equal to 38C (100.4F), Moderate Pain (4 - 6)  ALPRAZolam 0.5 milliGRAM(s) Oral two times a day PRN anxiety  ondansetron Injectable 4 milliGRAM(s) IV Push every 6 hours PRN Nausea and/or Vomiting      PHYSICAL EXAM:  GENERAL: NAD, well-groomed, well-developed  NERVOUS SYSTEM:  Alert & Oriented X3, Good concentration; Motor Strength 5/5 B/L upper and lower extremities  CHEST/LUNG: Clear to auscultation bilaterally; No rales, rhonchi, wheezing, or rubs  HEART: Regular rate and rhythm; No murmurs, rubs, or gallops  ABDOMEN: Soft, Nontender, Nondistended; Bowel sounds present  EXTREMITIES:  RLE bandage clean, dry and intact  SKIN: No rashes or lesions    LABS:                        9.6    14.72 )-----------( 292      ( 16 Oct 2021 07:26 )             28.6     10-16    135  |  99  |  23<H>  ----------------------------<  271<H>  3.8   |  27  |  1.38<H>    Ca    8.5      16 Oct 2021 07:26  Phos  3.6     10-16  Mg     2.0     10-16    TPro  7.7  /  Alb  2.7<L>  /  TBili  0.7  /  DBili  x   /  AST  14  /  ALT  24  /  AlkPhos  68  10-15    PT/INR - ( 15 Oct 2021 13:37 )   PT: 16.1 sec;   INR: 1.37 ratio         PTT - ( 15 Oct 2021 13:37 )  PTT:32.6 sec    CAPILLARY BLOOD GLUCOSE      POCT Blood Glucose.: 159 mg/dL (16 Oct 2021 16:28)  POCT Blood Glucose.: 196 mg/dL (16 Oct 2021 11:35)  POCT Blood Glucose.: 302 mg/dL (16 Oct 2021 07:36)  POCT Blood Glucose.: 291 mg/dL (15 Oct 2021 22:11)

## 2021-10-16 NOTE — PROGRESS NOTE ADULT - PROBLEM SELECTOR PLAN 5
Not compliant with medications Coreg 3.125 BID, and Lasix 20mg, lisinopril  no signs of fluid overload  Primary cardiologist Dr. Fairchild

## 2021-10-17 DIAGNOSIS — I96 GANGRENE, NOT ELSEWHERE CLASSIFIED: ICD-10-CM

## 2021-10-17 DIAGNOSIS — I77.1 STRICTURE OF ARTERY: ICD-10-CM

## 2021-10-17 LAB
ANION GAP SERPL CALC-SCNC: 7 MMOL/L — SIGNIFICANT CHANGE UP (ref 5–17)
APPEARANCE UR: CLEAR — SIGNIFICANT CHANGE UP
BACTERIA # UR AUTO: NEGATIVE /HPF — SIGNIFICANT CHANGE UP
BASOPHILS # BLD AUTO: 0.02 K/UL — SIGNIFICANT CHANGE UP (ref 0–0.2)
BASOPHILS NFR BLD AUTO: 0.2 % — SIGNIFICANT CHANGE UP (ref 0–2)
BILIRUB UR-MCNC: NEGATIVE — SIGNIFICANT CHANGE UP
BUN SERPL-MCNC: 21 MG/DL — HIGH (ref 7–18)
CALCIUM SERPL-MCNC: 8.9 MG/DL — SIGNIFICANT CHANGE UP (ref 8.4–10.5)
CHLORIDE SERPL-SCNC: 98 MMOL/L — SIGNIFICANT CHANGE UP (ref 96–108)
CO2 SERPL-SCNC: 28 MMOL/L — SIGNIFICANT CHANGE UP (ref 22–31)
COLOR SPEC: YELLOW — SIGNIFICANT CHANGE UP
CREAT SERPL-MCNC: 1.24 MG/DL — SIGNIFICANT CHANGE UP (ref 0.5–1.3)
DIFF PNL FLD: ABNORMAL
EOSINOPHIL # BLD AUTO: 0.03 K/UL — SIGNIFICANT CHANGE UP (ref 0–0.5)
EOSINOPHIL NFR BLD AUTO: 0.2 % — SIGNIFICANT CHANGE UP (ref 0–6)
EPI CELLS # UR: SIGNIFICANT CHANGE UP /HPF
GLUCOSE BLDC GLUCOMTR-MCNC: 209 MG/DL — HIGH (ref 70–99)
GLUCOSE BLDC GLUCOMTR-MCNC: 232 MG/DL — HIGH (ref 70–99)
GLUCOSE BLDC GLUCOMTR-MCNC: 247 MG/DL — HIGH (ref 70–99)
GLUCOSE BLDC GLUCOMTR-MCNC: 250 MG/DL — HIGH (ref 70–99)
GLUCOSE SERPL-MCNC: 182 MG/DL — HIGH (ref 70–99)
GLUCOSE UR QL: NEGATIVE — SIGNIFICANT CHANGE UP
HCT VFR BLD CALC: 29.5 % — LOW (ref 39–50)
HGB BLD-MCNC: 10 G/DL — LOW (ref 13–17)
IMM GRANULOCYTES NFR BLD AUTO: 0.6 % — SIGNIFICANT CHANGE UP (ref 0–1.5)
KETONES UR-MCNC: NEGATIVE — SIGNIFICANT CHANGE UP
LEUKOCYTE ESTERASE UR-ACNC: NEGATIVE — SIGNIFICANT CHANGE UP
LYMPHOCYTES # BLD AUTO: 1.64 K/UL — SIGNIFICANT CHANGE UP (ref 1–3.3)
LYMPHOCYTES # BLD AUTO: 13.1 % — SIGNIFICANT CHANGE UP (ref 13–44)
MAGNESIUM SERPL-MCNC: 2.2 MG/DL — SIGNIFICANT CHANGE UP (ref 1.6–2.6)
MCHC RBC-ENTMCNC: 30.4 PG — SIGNIFICANT CHANGE UP (ref 27–34)
MCHC RBC-ENTMCNC: 33.9 GM/DL — SIGNIFICANT CHANGE UP (ref 32–36)
MCV RBC AUTO: 89.7 FL — SIGNIFICANT CHANGE UP (ref 80–100)
MONOCYTES # BLD AUTO: 0.87 K/UL — SIGNIFICANT CHANGE UP (ref 0–0.9)
MONOCYTES NFR BLD AUTO: 7 % — SIGNIFICANT CHANGE UP (ref 2–14)
NEUTROPHILS # BLD AUTO: 9.87 K/UL — HIGH (ref 1.8–7.4)
NEUTROPHILS NFR BLD AUTO: 78.9 % — HIGH (ref 43–77)
NITRITE UR-MCNC: NEGATIVE — SIGNIFICANT CHANGE UP
NRBC # BLD: 0 /100 WBCS — SIGNIFICANT CHANGE UP (ref 0–0)
PH UR: 5 — SIGNIFICANT CHANGE UP (ref 5–8)
PHOSPHATE SERPL-MCNC: 3.4 MG/DL — SIGNIFICANT CHANGE UP (ref 2.5–4.5)
PLATELET # BLD AUTO: 332 K/UL — SIGNIFICANT CHANGE UP (ref 150–400)
POTASSIUM SERPL-MCNC: 3.8 MMOL/L — SIGNIFICANT CHANGE UP (ref 3.5–5.3)
POTASSIUM SERPL-SCNC: 3.8 MMOL/L — SIGNIFICANT CHANGE UP (ref 3.5–5.3)
PROT UR-MCNC: 100
RBC # BLD: 3.29 M/UL — LOW (ref 4.2–5.8)
RBC # FLD: 11.9 % — SIGNIFICANT CHANGE UP (ref 10.3–14.5)
RBC CASTS # UR COMP ASSIST: SIGNIFICANT CHANGE UP /HPF (ref 0–2)
SODIUM SERPL-SCNC: 133 MMOL/L — LOW (ref 135–145)
SP GR SPEC: 1.02 — SIGNIFICANT CHANGE UP (ref 1.01–1.02)
UROBILINOGEN FLD QL: NEGATIVE — SIGNIFICANT CHANGE UP
VANCOMYCIN TROUGH SERPL-MCNC: 16.3 UG/ML — SIGNIFICANT CHANGE UP (ref 10–20)
WBC # BLD: 12.51 K/UL — HIGH (ref 3.8–10.5)
WBC # FLD AUTO: 12.51 K/UL — HIGH (ref 3.8–10.5)
WBC UR QL: SIGNIFICANT CHANGE UP /HPF (ref 0–5)

## 2021-10-17 PROCEDURE — 99233 SBSQ HOSP IP/OBS HIGH 50: CPT | Mod: GC

## 2021-10-17 PROCEDURE — 99221 1ST HOSP IP/OBS SF/LOW 40: CPT

## 2021-10-17 RX ORDER — GLUCAGON INJECTION, SOLUTION 0.5 MG/.1ML
1 INJECTION, SOLUTION SUBCUTANEOUS ONCE
Refills: 0 | Status: DISCONTINUED | OUTPATIENT
Start: 2021-10-17 | End: 2021-10-19

## 2021-10-17 RX ORDER — DEXTROSE 50 % IN WATER 50 %
25 SYRINGE (ML) INTRAVENOUS ONCE
Refills: 0 | Status: DISCONTINUED | OUTPATIENT
Start: 2021-10-17 | End: 2021-10-19

## 2021-10-17 RX ORDER — INSULIN LISPRO 100/ML
2 VIAL (ML) SUBCUTANEOUS
Refills: 0 | Status: DISCONTINUED | OUTPATIENT
Start: 2021-10-17 | End: 2021-10-19

## 2021-10-17 RX ORDER — FUROSEMIDE 40 MG
20 TABLET ORAL ONCE
Refills: 0 | Status: COMPLETED | OUTPATIENT
Start: 2021-10-17 | End: 2021-10-17

## 2021-10-17 RX ORDER — SODIUM CHLORIDE 9 MG/ML
1000 INJECTION, SOLUTION INTRAVENOUS
Refills: 0 | Status: DISCONTINUED | OUTPATIENT
Start: 2021-10-17 | End: 2021-10-19

## 2021-10-17 RX ORDER — FUROSEMIDE 40 MG
20 TABLET ORAL DAILY
Refills: 0 | Status: DISCONTINUED | OUTPATIENT
Start: 2021-10-17 | End: 2021-10-18

## 2021-10-17 RX ADMIN — Medication 20 MILLIGRAM(S): at 10:36

## 2021-10-17 RX ADMIN — CEFEPIME 100 MILLIGRAM(S): 1 INJECTION, POWDER, FOR SOLUTION INTRAMUSCULAR; INTRAVENOUS at 17:33

## 2021-10-17 RX ADMIN — Medication 2: at 08:09

## 2021-10-17 RX ADMIN — Medication 300 MILLIGRAM(S): at 18:38

## 2021-10-17 RX ADMIN — CEFEPIME 100 MILLIGRAM(S): 1 INJECTION, POWDER, FOR SOLUTION INTRAMUSCULAR; INTRAVENOUS at 05:30

## 2021-10-17 RX ADMIN — Medication 650 MILLIGRAM(S): at 22:33

## 2021-10-17 RX ADMIN — Medication 650 MILLIGRAM(S): at 23:03

## 2021-10-17 RX ADMIN — Medication 300 MILLIGRAM(S): at 06:00

## 2021-10-17 RX ADMIN — Medication 650 MILLIGRAM(S): at 05:49

## 2021-10-17 RX ADMIN — Medication 81 MILLIGRAM(S): at 11:41

## 2021-10-17 RX ADMIN — Medication 650 MILLIGRAM(S): at 06:19

## 2021-10-17 NOTE — PROGRESS NOTE ADULT - ATTENDING COMMENTS
Patient was seen and examined at bedside   Complains of mild pain in foot   Feeling anxious     Vitals stable     P/E:  BL basal crepitation   Gangrenous 3rd and 2nd toe of right toe    Labs noted    A/P:  Will cont Vanc and Cefepime  Pending MRI  Appreciate podiatry consult   Change lasix from PO to IV  Rest of the management as above

## 2021-10-17 NOTE — PROGRESS NOTE ADULT - SUBJECTIVE AND OBJECTIVE BOX
Patient is a 51y old  Male who presents with a chief complaint of Cellulitis (16 Oct 2021 16:06)    Patient was seen and examined at bedside   Complains of mild pain in foot, denies any other complains     INTERVAL HPI/OVERNIGHT EVENTS:  T(C): 36.4 (10-16-21 @ 14:40), Max: 39.4 (10-15-21 @ 22:25)  HR: 91 (10-16-21 @ 14:40) (83 - 122)  BP: 108/73 (10-16-21 @ 14:40) (97/63 - 124/76)  RR: 18 (10-16-21 @ 14:40) (16 - 18)  SpO2: 100% (10-16-21 @ 14:40) (96% - 100%)  Wt(kg): --  I&O's Summary      REVIEW OF SYSTEMS: denies fever, chills, SOB, palpitations, chest pain, abdominal pain, nausea, vomiting, diarrhea, constipation, dizziness    MEDICATIONS  (STANDING):  aspirin enteric coated 81 milliGRAM(s) Oral daily  atorvastatin 80 milliGRAM(s) Oral at bedtime  carvedilol 3.125 milliGRAM(s) Oral every 12 hours  cefepime   IVPB 2000 milliGRAM(s) IV Intermittent every 12 hours  enoxaparin Injectable 40 milliGRAM(s) SubCutaneous daily  furosemide    Tablet 20 milliGRAM(s) Oral daily  influenza   Vaccine 0.5 milliLiter(s) IntraMuscular once  insulin lispro (ADMELOG) corrective regimen sliding scale   SubCutaneous Before meals and at bedtime  oxycodone    5 mG/acetaminophen 325 mG 2 Tablet(s) Oral once  vancomycin  IVPB 1500 milliGRAM(s) IV Intermittent every 12 hours    MEDICATIONS  (PRN):  acetaminophen   Tablet .. 650 milliGRAM(s) Oral every 6 hours PRN Temp greater or equal to 38C (100.4F), Moderate Pain (4 - 6)  ALPRAZolam 0.5 milliGRAM(s) Oral two times a day PRN anxiety  ondansetron Injectable 4 milliGRAM(s) IV Push every 6 hours PRN Nausea and/or Vomiting      PHYSICAL EXAM:  GENERAL: NAD, well-groomed, well-developed  NERVOUS SYSTEM:  Alert & Oriented X3, Good concentration; Motor Strength 5/5 B/L upper and lower extremities  CHEST/LUNG: Clear to auscultation bilaterally; No rales, rhonchi, wheezing, or rubs  HEART: Regular rate and rhythm; No murmurs, rubs, or gallops  ABDOMEN: Soft, Nontender, Nondistended; Bowel sounds present  EXTREMITIES:  RLE bandage clean, dry and intact  SKIN: No rashes or lesions    LABS:                        9.6    14.72 )-----------( 292      ( 16 Oct 2021 07:26 )             28.6     10-16    135  |  99  |  23<H>  ----------------------------<  271<H>  3.8   |  27  |  1.38<H>    Ca    8.5      16 Oct 2021 07:26  Phos  3.6     10-16  Mg     2.0     10-16    TPro  7.7  /  Alb  2.7<L>  /  TBili  0.7  /  DBili  x   /  AST  14  /  ALT  24  /  AlkPhos  68  10-15    PT/INR - ( 15 Oct 2021 13:37 )   PT: 16.1 sec;   INR: 1.37 ratio         PTT - ( 15 Oct 2021 13:37 )  PTT:32.6 sec    CAPILLARY BLOOD GLUCOSE      POCT Blood Glucose.: 159 mg/dL (16 Oct 2021 16:28)  POCT Blood Glucose.: 196 mg/dL (16 Oct 2021 11:35)  POCT Blood Glucose.: 302 mg/dL (16 Oct 2021 07:36)  POCT Blood Glucose.: 291 mg/dL (15 Oct 2021 22:11)         PGY-1 Progress Note discussed with attending    PAGER #: [[phone]] TILL 5:00 PM  PLEASE CONTACT ON CALL TEAM:  - On Call Team (Please refer to Quintin) FROM 5:00 PM - 8:30PM  - Nightfloat Team FROM 8:30 -7:30 AM    CHIEF COMPLAINT & BRIEF HOSPITAL COURSE:  51 year old male with PMH CVA (10 years ago, no deficits), HTN, DM, CHF (EF 30-35% 2/21), presented with right foot swelling. Patient stated two weeks ago he noticed bleeding in his shoes and found a staple stuck to the bottom of his feet. He didn't notice it before because he states he doesn't have any sensation in his feet. He went to urgent care, where they prescribed him Keflex Q8hrs. Patient states the foot started to swell more so he returned to urgent care yesterday where they gave him a prescription of clindamycin. Patient this morning started to endorse chills, nausea, but denies any fever, vomiting, chest pain, SOB, abdominal pain, or changes in bowel habits.  Patient was admitted in February due to covid, and was found to have CHF and DM. Patient has not been complaint with any of his medications and states he just takes Lasix and Xanax.     INTERVAL HPI/OVERNIGHT EVENTS:   No events overnight. Pt complains of right foot neuropathic pain, rates it as a 4/10 and dull. Endorses a dry cough. Crackles were heard in b/l lung bases but pt denies any SOB    REVIEW OF SYSTEMS:  CONSTITUTIONAL: +ve chills No fever, weight loss, or fatigue  RESPIRATORY: +ve dry cough, Denies wheezing, chills or hemoptysis; No shortness of breath  CARDIOVASCULAR: No chest pain, palpitations, dizziness, or leg swelling  GASTROINTESTINAL: +ve Nausea, pt associates with Antibiotic use. No abdominal pain. No vomiting, or hematemesis; No diarrhea or constipation. No melena or hematochezia.  GENITOURINARY: No dysuria or hematuria, urinary frequency  NEUROLOGICAL: +ve numbness of feet b/l No headaches, memory loss, loss of strength  SKIN: Gangrenous 3rd toe of right foot.  mild pain to palpation,    Vital Signs Last 24 Hrs  T(C): 36.4 (17 Oct 2021 05:32), Max: 38.3 (16 Oct 2021 20:42)  T(F): 97.5 (17 Oct 2021 05:32), Max: 101 (16 Oct 2021 20:42)  HR: 94 (17 Oct 2021 05:32) (91 - 107)  BP: 104/67 (17 Oct 2021 05:32) (104/67 - 138/87)  BP(mean): --  RR: 17 (17 Oct 2021 05:32) (17 - 18)  SpO2: 100% (17 Oct 2021 05:32) (99% - 100%)    PHYSICAL EXAMINATION:  GENERAL: NAD, well built  HEAD:  Atraumatic, Normocephalic  EYES:  conjunctiva and sclera clear  NECK: Supple, No JVD, Normal thyroid  CHEST/LUNG: +ve crackles in b/l lung bases. No rales, rhonchi, wheezing, or rubs  HEART: Regular rate and rhythm; No murmurs, rubs, or gallops  ABDOMEN: Soft, Nontender, Nondistended; Bowel sounds present  NERVOUS SYSTEM:  Alert & Oriented X3,    EXTREMITIES:  2+ Peripheral Pulses, No clubbing, cyanosis, or edema  SKIN: Gangrenous 3rd toe of right foot. skin partially removed, yellowish color under 3rd toe, decrease sensation, mild pain to palpation, faint pulses.                          10.0   12.51 )-----------( 332      ( 17 Oct 2021 04:55 )             29.5     10-17    133<L>  |  98  |  21<H>  ----------------------------<  182<H>  3.8   |  28  |  1.24    Ca    8.9      17 Oct 2021 04:55  Phos  3.4     10-17  Mg     2.2     10-17    TPro  7.7  /  Alb  2.7<L>  /  TBili  0.7  /  DBili  x   /  AST  14  /  ALT  24  /  AlkPhos  68  10-15    LIVER FUNCTIONS - ( 15 Oct 2021 13:37 )  Alb: 2.7 g/dL / Pro: 7.7 g/dL / ALK PHOS: 68 U/L / ALT: 24 U/L DA / AST: 14 U/L / GGT: x               PT/INR - ( 15 Oct 2021 13:37 )   PT: 16.1 sec;   INR: 1.37 ratio         PTT - ( 15 Oct 2021 13:37 )  PTT:32.6 sec    CAPILLARY BLOOD GLUCOSE      RADIOLOGY & ADDITIONAL TESTS:

## 2021-10-17 NOTE — PROGRESS NOTE ADULT - PROBLEM SELECTOR PLAN 1
Cont Vanc and Cefepime  F/u Trough 10/19 5am  Dr Mesa - ID consulted  Pending MRI foot to r/o abscess or OM  MIGDALIA/PVR shows noncompressible vessels   Podiatry consulted

## 2021-10-17 NOTE — CONSULT NOTE ADULT - SUBJECTIVE AND OBJECTIVE BOX
52 yo male who presents to the ED for a right foot infection. Pt states that he stepped on a brass staple about 2 weeks ago and had no idea it punctured through the skin until the next morning when he started to feel pain. Pt states that he is diabetic so his sensation in his feet is diminished. Pt reports that he went to the urgent care and was prescribed keflex which he finished. He was also told by the urgent care to soak the foot in epsom salt and apply bacitracin cream on it. Pt reports that soaking it made it worse and turned into a infection a few days ago. Pt is complaining 6/10 pain on the right foot. Admits to having chills. Pt denies constitutional symptoms of N/V/C/F/Sob.     GEN: no acute distress, alert awake, and oriented to person, place and time.   S1S2  good b/l air entry  RLE warm, pitting edema b/l, moves toes, weakly palp R PT  LE Focused as per podiatry   Vasc: DP/PT pulses faintly palpable, TG warm to cool, right 3rd toe felt cool to touch, pitting edema present on bilateral legs  Derm: Cellulitis noted on the dorsum of 3rd digit tracking proximally to midfoot, soft tissue integrity of third digit is macerated and loose, discoloration noted to the 3rd toe and 2nd toe, submet 2/3 of the right foot is macerated with fluctuance noted on the plantar aspect, puncture wound noted to plantar submet 3, no drainage noted   Neuro: Protective sensation grossly diminished  MSK: Able to wiggles toes, pain on palpation to the third digit     per podiatry 10/15: s/p bedside I&D: tracking noted in all directions, probes to bone, serosanguinous drainage noted     < from: [facility] Physiol Extremity Lower 3+ Level, BI (10.16.21 @ 13:08) >    FINDINGS: There are pulsatile waveforms bilaterally. Segmental pressures could not be obtained due to calcified, noncompressible vessels. Therefore, ABIs could not be calculated.    IMPRESSION: ABIs could not be calculated due to calcified, noncompressible vessels.    < end of copied text >   50 yo male who presents to the ED for a right foot infection. Pt states that he stepped on a brass staple about 2 weeks ago and had no idea it punctured through the skin until the next morning when he started to feel pain. Pt states that he is diabetic so his sensation in his feet is diminished. Pt reports that he went to the urgent care and was prescribed keflex which he finished. He was also told by the urgent care to soak the foot in epsom salt and apply bacitracin cream on it. Pt reports that soaking it made it worse and turned into a infection a few days ago. Pt is complaining 6/10 pain on the right foot. Admits to having chills. Pt denies constitutional symptoms of N/V/C/F/Sob.     Vital Signs Last 24 Hrs  T(C): 36.3 (17 Oct 2021 13:52), Max: 38.3 (16 Oct 2021 20:42)  T(F): 97.4 (17 Oct 2021 13:52), Max: 101 (16 Oct 2021 20:42)  HR: 91 (17 Oct 2021 13:52) (91 - 107)  BP: 117/79 (17 Oct 2021 13:52) (104/67 - 138/87)  BP(mean): --  RR: 18 (17 Oct 2021 13:52) (17 - 18)  SpO2: 100% (17 Oct 2021 13:52) (99% - 100%)                          10.0   12.51 )-----------( 332      ( 17 Oct 2021 04:55 )             29.5     GEN: no acute distress, alert awake, and oriented to person, place and time.   S1S2  good b/l air entry  RLE warm, pitting edema b/l, moves toes, weakly palp R PT  LE Focused as per podiatry   Vasc: DP/PT pulses faintly palpable, TG warm to cool, right 3rd toe felt cool to touch, pitting edema present on bilateral legs  Derm: Cellulitis noted on the dorsum of 3rd digit tracking proximally to midfoot, soft tissue integrity of third digit is macerated and loose, discoloration noted to the 3rd toe and 2nd toe, submet 2/3 of the right foot is macerated with fluctuance noted on the plantar aspect, puncture wound noted to plantar submet 3, no drainage noted   Neuro: Protective sensation grossly diminished  MSK: Able to wiggles toes, pain on palpation to the third digit     per podiatry 10/15: s/p bedside I&D: tracking noted in all directions, probes to bone, serosanguinous drainage noted     < from: [facility] Physiol Extremity Lower 3+ Level, BI (10.16.21 @ 13:08) >    FINDINGS: There are pulsatile waveforms bilaterally. Segmental pressures could not be obtained due to calcified, noncompressible vessels. Therefore, ABIs could not be calculated.    IMPRESSION: ABIs could not be calculated due to calcified, noncompressible vessels.    < end of copied text >     HPI:  51 year old male with PMH CVA (10 years ago, no deficits), HTN, DM, CHF (EF 30-35% 2/21), presented with right foot swelling. Patient stated two weeks ago he noticed bleeding in his shoes and found a staple stuck to the bottom of his feet. He didn't notice it before because he states he doesn't have any sensation in his feet. He went to urgent care, where they prescribed him Keflex Q8hrs. Patient states the foot started to swell more so he returned to urgent care yesterday where they gave him a prescription of clindamycin. Patient this morning started to endorse chills, nausea, but denies any fever, vomiting, chest pain, SOB, abdominal pain, or changes in bowel habits.  Patient was admitted in February due to covid, and was found to have CHF and DM. Patient has not been complaint with any of his medications and states he just takes Lasix and Xanax.     In the ED:  Afebrile, 120-127/78  HR   98% RA  WBC 12.3, Hb 10.4, PT 16.1, INR 1.37  Lactate WNL   CXR: no focal infiltrates or consolidation   XRAY Right foot: pending  Given Vanc and Zosyn   Podiatry performed I/D with sanguinous drainage upon manual manipulation  (15 Oct 2021 17:58)      PAST MEDICAL & SURGICAL HISTORY:  CVA (cerebral vascular accident)    HTN (hypertension)    Anxiety    DM (diabetes mellitus)    Chronic CHF        Vital Signs Last 24 Hrs  T(C): 36.3 (17 Oct 2021 13:52), Max: 38.3 (16 Oct 2021 20:42)  T(F): 97.4 (17 Oct 2021 13:52), Max: 101 (16 Oct 2021 20:42)  HR: 91 (17 Oct 2021 13:52) (91 - 107)  BP: 117/79 (17 Oct 2021 13:52) (104/67 - 138/87)  BP(mean): --  RR: 18 (17 Oct 2021 13:52) (17 - 18)  SpO2: 100% (17 Oct 2021 13:52) (99% - 100%)                          10.0   12.51 )-----------( 332      ( 17 Oct 2021 04:55 )             29.5     10-17    133<L>  |  98  |  21<H>  ----------------------------<  182<H>  3.8   |  28  |  1.24    Ca    8.9      17 Oct 2021 04:55  Phos  3.4     10-17  Mg     2.2     10-17      GEN: no acute distress, alert awake, and oriented to person, place and time.   S1S2  good b/l air entry  RLE warm, pitting edema b/l, moves toes, weakly palp R PT  LE Focused as per podiatry   Vasc: DP/PT pulses faintly palpable, TG warm to cool, right 3rd toe felt cool to touch, pitting edema present on bilateral legs  Derm: Cellulitis noted on the dorsum of 3rd digit tracking proximally to midfoot, soft tissue integrity of third digit is macerated and loose, discoloration noted to the 3rd toe and 2nd toe, submet 2/3 of the right foot is macerated with fluctuance noted on the plantar aspect, puncture wound noted to plantar submet 3, no drainage noted   Neuro: Protective sensation grossly diminished  MSK: Able to wiggles toes, pain on palpation to the third digit     per podiatry 10/15: s/p bedside I&D: tracking noted in all directions, probes to bone, serosanguinous drainage noted     < from: [facility] Physiol Extremity Lower 3+ Level, BI (10.16.21 @ 13:08) >    FINDINGS: There are pulsatile waveforms bilaterally. Segmental pressures could not be obtained due to calcified, noncompressible vessels. Therefore, ABIs could not be calculated.    IMPRESSION: ABIs could not be calculated due to calcified, noncompressible vessels.    < end of copied text >    51 year old male with PMH CVA (10 years ago, no deficits), HTN, DM, CHF (EF 30-35% 2/21), presented with right foot swelling. Patient stated two weeks ago he noticed bleeding in his shoes and found a staple stuck to the bottom of his feet. He didn't notice it before because he states he doesn't have any sensation in his feet.  recommend cont care/mgmt as per podiatry  f/u MRI  case discussed with and PVRs reviewed with Dr Peña HPI:  51 year old male with PMH CVA (10 years ago, no deficits), HTN, DM, CHF (EF 30-35% 2/21), presented with right foot swelling. Patient stated two weeks ago he noticed bleeding in his shoes and found a staple stuck to the bottom of his feet. He didn't notice it before because he states he doesn't have any sensation in his feet. He went to urgent care, where they prescribed him Keflex Q8hrs. Patient states the foot started to swell more so he returned to urgent care yesterday where they gave him a prescription of clindamycin. Patient this morning started to endorse chills, nausea, but denies any fever, vomiting, chest pain, SOB, abdominal pain, or changes in bowel habits.  Patient was admitted in February due to covid, and was found to have CHF and DM. Patient has not been complaint with any of his medications and states he just takes Lasix and Xanax.   VASCULAR SURGERY ATT ADDENDUM  Pt denies le intermittent claudication or nocturnal leg or foot cramps     In the ED:  Afebrile, 120-127/78  HR   98% RA  WBC 12.3, Hb 10.4, PT 16.1, INR 1.37  Lactate WNL   CXR: no focal infiltrates or consolidation   XRAY Right foot: pending  Given Renetta and Ronan   Podiatry performed I/D with sanguinous drainage upon manual manipulation  (15 Oct 2021 17:58)      PAST MEDICAL & SURGICAL HISTORY:  CVA (cerebral vascular accident)    HTN (hypertension)    Anxiety    DM (diabetes mellitus)    Chronic CHF        Vital Signs Last 24 Hrs  T(C): 36.3 (17 Oct 2021 13:52), Max: 38.3 (16 Oct 2021 20:42)  T(F): 97.4 (17 Oct 2021 13:52), Max: 101 (16 Oct 2021 20:42)  HR: 91 (17 Oct 2021 13:52) (91 - 107)  BP: 117/79 (17 Oct 2021 13:52) (104/67 - 138/87)  BP(mean): --  RR: 18 (17 Oct 2021 13:52) (17 - 18)  SpO2: 100% (17 Oct 2021 13:52) (99% - 100%)                          10.0   12.51 )-----------( 332      ( 17 Oct 2021 04:55 )             29.5     10-17    133<L>  |  98  |  21<H>  ----------------------------<  182<H>  3.8   |  28  |  1.24    Ca    8.9      17 Oct 2021 04:55  Phos  3.4     10-17  Mg     2.2     10-17      GEN: no acute distress, alert awake, and oriented to person, place and time.   S1S2  good b/l air entry  RLE warm, pitting edema b/l, moves toes, weakly palp R PT  LE Focused as per podiatry   Vasc: DP/PT pulses faintly palpable, TG warm to cool, right 3rd toe felt cool to touch, pitting edema present on bilateral legs  Derm: Cellulitis noted on the dorsum of 3rd digit tracking proximally to midfoot, soft tissue integrity of third digit is macerated and loose, discoloration noted to the 3rd toe and 2nd toe, submet 2/3 of the right foot is macerated with fluctuance noted on the plantar aspect, puncture wound noted to plantar submet 3, no drainage noted   Neuro: Protective sensation grossly diminished  MSK: Able to wiggles toes, pain on palpation to the third digit     per podiatry 10/15: s/p bedside I&D: tracking noted in all directions, probes to bone, serosanguinous drainage noted     < from: [facility] Physiol Extremity Lower 3+ Level, BI (10.16.21 @ 13:08) >    FINDINGS: There are pulsatile waveforms bilaterally. Segmental pressures could not be obtained due to calcified, noncompressible vessels. Therefore, ABIs could not be calculated.    IMPRESSION: ABIs could not be calculated due to calcified, noncompressible vessels.    < end of copied text >

## 2021-10-17 NOTE — PROGRESS NOTE ADULT - PROBLEM SELECTOR PLAN 5
Not compliant with medications Coreg 3.125 BID, and Lasix 20mg, lisinopril  C/w Lasix 20mg IV on 10/17  C/w Coreg 3.125 daily   Will change to 40mg 10/18  B/l crackles in lower lung bases  Primary cardiologist Dr. Fairchild

## 2021-10-17 NOTE — PROGRESS NOTE ADULT - SUBJECTIVE AND OBJECTIVE BOX
Podiatry Interval HPI: Pt seen resting in bed. Pt has been wearing surgical shoe. Pt endorses severe pain to R foot. Admits to chills. No other pedal complaints.      Podiatry HPI: Podiatry consulted regarding a 50 yo male who presents to the ED for a right foot infection. Pt states that he stepped on a brass staple about 2 weeks ago and had no idea it punctured through the skin until the next morning when he started to feel pain. Pt states that he is diabetic so his sensation in his feet is diminished. Pt reports that he went to the urgent care and was prescribed keflex which he finished. He was also told by the urgent care to soak the foot in epsom salt and apply bacitracin cream on it. Pt reports that soaking it made it worse and turned into a infection a few days ago. Pt is complaining 6/10 pain on the right foot. Admits to having chills. Pt denies constitutional symptoms of N/V/C/F/Sob.     HPI: Patient is a 50 y/o male who presents with worsening right foot pain and wound x2 weeks for which he was prescribed keflex course which he completed and a clindamycin course which he just started. Patient has PMH significant for DM, CHF. Patient reports that a "contractor grade staple" punctured his foot and he didn't realize until the next morning when he couldn't bear weight. He reports he is up to date on his tetanus shot. He reports chills but denies fever as he "did not check it" and ascending leg pain at times. Denies shortness of breath, chest pain or pressure, nausea or vomiting.      Medications acetaminophen   Tablet .. 650 milliGRAM(s) Oral every 6 hours PRN  ALPRAZolam 0.5 milliGRAM(s) Oral two times a day PRN  aspirin enteric coated 81 milliGRAM(s) Oral daily  atorvastatin 80 milliGRAM(s) Oral at bedtime  carvedilol 3.125 milliGRAM(s) Oral every 12 hours  cefepime   IVPB 2000 milliGRAM(s) IV Intermittent every 12 hours  dextrose 5%. 1000 milliLiter(s) IV Continuous <Continuous>  dextrose 50% Injectable 25 Gram(s) IV Push once  enoxaparin Injectable 40 milliGRAM(s) SubCutaneous daily  furosemide    Tablet 20 milliGRAM(s) Oral daily  glucagon  Injectable 1 milliGRAM(s) IntraMuscular once  influenza   Vaccine 0.5 milliLiter(s) IntraMuscular once  insulin lispro (ADMELOG) corrective regimen sliding scale   SubCutaneous Before meals and at bedtime  insulin lispro Injectable (ADMELOG) 2 Unit(s) SubCutaneous three times a day before meals  ondansetron Injectable 4 milliGRAM(s) IV Push every 6 hours PRN  oxycodone    5 mG/acetaminophen 325 mG 2 Tablet(s) Oral once  vancomycin  IVPB 1500 milliGRAM(s) IV Intermittent every 12 hours    FH: No pertinent family history    ,   PMH: CVA (cerebral vascular accident)    HTN (hypertension)    Anxiety    DM (diabetes mellitus)    Chronic CHF       PSH: No pertinent past surgical history        Labs                          10.0   12.51 )-----------( 332      ( 17 Oct 2021 04:55 )             29.5      10-17    133<L>  |  98  |  21<H>  ----------------------------<  182<H>  3.8   |  28  |  1.24    Ca    8.9      17 Oct 2021 04:55  Phos  3.4     10-17  Mg     2.2     10-17    TPro  7.7  /  Alb  2.7<L>  /  TBili  0.7  /  DBili  x   /  AST  14  /  ALT  24  /  AlkPhos  68  10-15     Vital Signs Last 24 Hrs  T(C): 36.4 (17 Oct 2021 05:32), Max: 38.3 (16 Oct 2021 20:42)  T(F): 97.5 (17 Oct 2021 05:32), Max: 101 (16 Oct 2021 20:42)  HR: 94 (17 Oct 2021 05:32) (91 - 107)  BP: 104/67 (17 Oct 2021 05:32) (104/67 - 138/87)  BP(mean): --  RR: 17 (17 Oct 2021 05:32) (17 - 18)  SpO2: 100% (17 Oct 2021 05:32) (99% - 100%)  Sedimentation Rate, Erythrocyte: 99 mm/Hr (10-15-21 @ 13:37)         C-Reactive Protein, Serum: 106 mg/L (10-16-21 @ 01:09)   WBC Count: 12.51 K/uL *H* (10-17-21 @ 04:55)    PT/INR - ( 15 Oct 2021 13:37 )   PT: 16.1 sec;   INR: 1.37 ratio         PTT - ( 15 Oct 2021 13:37 )  PTT:32.6 sec  Urinalysis Basic - ( 17 Oct 2021 04:56 )    Color: Yellow / Appearance: Clear / S.020 / pH: x  Gluc: x / Ketone: Negative  / Bili: Negative / Urobili: Negative   Blood: x / Protein: 100 / Nitrite: Negative   Leuk Esterase: Negative / RBC: 0-2 /HPF / WBC 0-2 /HPF   Sq Epi: x / Non Sq Epi: Few /HPF / Bacteria: Negative /HPF      CAPILLARY BLOOD GLUCOSE      POCT Blood Glucose.: 232 mg/dL (17 Oct 2021 07:31)  POCT Blood Glucose.: 234 mg/dL (16 Oct 2021 21:15)  POCT Blood Glucose.: 159 mg/dL (16 Oct 2021 16:28)  POCT Blood Glucose.: 196 mg/dL (16 Oct 2021 11:35)    ROS: All others negative unless otherwise stated in the HPI      PHYSICAL EXAM  GEN: CLARIBEL REICH is a pleasant well-nourished, well developed 51y Male in no acute distress, alert awake, and oriented to person, place and time.   LE Focused:    Vasc: DP/PT pulses faintly palpable, CFT < 5 seconds to digits, TG warm to cool, right 3rd toe felt cool to touch, pitting edema present on bilateral legs  Derm: Cellulitis noted on the dorsum of 3rd digit tracking proximally to midfoot, soft tissue integrity of third digit is macerated and loose, discoloration noted to the 3rd toe and 2nd toe, submet 2/3 of the right foot is macerated with fluctuance noted on the plantar aspect, puncture wound noted to plantar submet 3, no drainage noted   Neuro: Protective sensation grossly diminished  MSK: Able to wiggles toes, pain on palpation to the third digit     10/15: s/p bedside I&D: tracking noted in all directions, probes to bone, serosanguinous drainage noted       Imaging:  EXAM:  XR FOOT 2 VIEWS RT                          PROCEDURE DATE:  10/15/2021        INTERPRETATION:  Cellulitis swollen right foot.    3 views right foot.    No fracture or dislocation. No focal bone lysis or unusual periosteal reaction. Joint spaces preserved. Small calcaneal osteophytes. Vascular calcification. Soft tissue swelling mostly over the dorsum of the foot may reflect cellulitis. No soft tissue gas.    IMPRESSION: No acute or destructive osseous pathology.    If there is clinical suspicion of osteomyelitis consider bone scan or MRI

## 2021-10-17 NOTE — PROGRESS NOTE ADULT - SUBJECTIVE AND OBJECTIVE BOX
PATIENT SEEN AND EXAMINED ON :-10/17/2021  DATE OF SERVICE:   10/17/2021          Interim events noted,Labs ,Radiological studies and Cardiology tests reviewed .    CHIEF COMPLAINT & BRIEF HOSPITAL COURSE:  51 year old male with PMH CVA (10 years ago, no deficits), HTN, DM, CHF (EF 30-35% 2/21), presented with right foot swelling. Patient was admitted in February due to covid, and was found to have CHF and DM. Patient has not been complaint with any of his medications and states he just takes Lasix and Xanax.     In the ED:  Afebrile, 120-127/78  HR   98% RA  WBC 12.3, Hb 10.4, PT 16.1, INR 1.37  Lactate WNL   CXR: no focal infiltrates or consolidation   XRAY Right foot: pending  Given Vanc and Zosyn     INTERVAL HPI/OVERNIGHT EVENTS:   No events overnight. Pt complains of right foot neuropathic pain, rates it as a 4/10 and dull. Endorses a dry cough. Crackles were heard in b/l lung bases but pt denies any SOB    REVIEW OF SYSTEMS:  CONSTITUTIONAL: +ve chills No fever, weight loss, or fatigue  RESPIRATORY: +ve dry cough, Denies wheezing, chills or hemoptysis; No shortness of breath  CARDIOVASCULAR: No chest pain, palpitations, dizziness, or leg swelling  GASTROINTESTINAL: +ve Nausea, pt associates with Antibiotic use. No abdominal pain. No vomiting, or hematemesis; No diarrhea or constipation. No melena or hematochezia.  GENITOURINARY: No dysuria or hematuria, urinary frequency  NEUROLOGICAL: +ve numbness of feet b/l No headaches, memory loss, loss of strength  SKIN: Gangrenous 3rd toe of right foot.  mild pain to palpation,    Vital Signs Last 24 Hrs  T(C): 36.4 (17 Oct 2021 05:32), Max: 38.3 (16 Oct 2021 20:42)  T(F): 97.5 (17 Oct 2021 05:32), Max: 101 (16 Oct 2021 20:42)  HR: 94 (17 Oct 2021 05:32) (91 - 107)  BP: 104/67 (17 Oct 2021 05:32) (104/67 - 138/87)  RR: 17 (17 Oct 2021 05:32) (17 - 18)  SpO2: 100% (17 Oct 2021 05:32) (99% - 100%)    PHYSICAL EXAMINATION:  GENERAL: NAD, well built  HEAD:  Atraumatic, Normocephalic  EYES:  conjunctiva and sclera clear  NECK: Supple, No JVD, Normal thyroid  CHEST/LUNG: +ve crackles in b/l lung bases. No rales, rhonchi, wheezing, or rubs  HEART: Regular rate and rhythm; No murmurs, rubs, or gallops  ABDOMEN: Soft, Nontender, Nondistended; Bowel sounds present  NERVOUS SYSTEM:  Alert & Oriented X3,    EXTREMITIES:  2+ Peripheral Pulses, No clubbing, cyanosis, or edema  SKIN: Gangrenous 3rd toe of right foot. skin partially removed, yellowish color under 3rd toe, decrease sensation, mild pain to palpation, faint pulses.                          10.0   12.51 )-----------( 332      ( 17 Oct 2021 04:55 )             29.5     10-17    133<L>  |  98  |  21<H>  ----------------------------<  182<H>  3.8   |  28  |  1.24    Ca    8.9      17 Oct 2021 04:55  Phos  3.4     10-17  Mg     2.2     10-17    TPro  7.7  /  Alb  2.7<L>  /  TBili  0.7  /  DBili  x   /  AST  14  /  ALT  24  /  AlkPhos  68  10-15    LIVER FUNCTIONS - ( 15 Oct 2021 13:37 )  Alb: 2.7 g/dL / Pro: 7.7 g/dL / ALK PHOS: 68 U/L / ALT: 24 U/L DA / AST: 14 U/L / GGT: x               PT/INR - ( 15 Oct 2021 13:37 )   PT: 16.1 sec;   INR: 1.37 ratio         PTT - ( 15 Oct 2021 13:37 )  PTT:32.6 sec

## 2021-10-17 NOTE — CONSULT NOTE ADULT - ASSESSMENT
A:  Right foot infection  Cellulitis    P:  Pt seen and evaluated  Discussed clinical findings with the patient  X ray reviewed   A verbal consent was obtained for incision and drainage  Prepped the foot with chlorhexidine   Injected 10 cc of lidocaine 1% plain in a v block fashion to the plantar foot   Using a sterile #15 blade, a 1 cm incision was made plantarly in the submet 2/3 region   Sanguinous drainage was noted upon manual expression   A sterile hemostat was used to explore around the area for evacuation of any pus  Flushed the area copiously with a mixture of betadine and saline   Applied iodoform packing to the incision site and applied DSD to the right foot  Pt tolerated the procedure well  Pod plan: Recommend MRI of the right foot to r/o abscess, continue to monitor cellulitis   MIGDALIA/PVR ordered  Will follow while in house  Discussed with attending Dr. Kitchen   
Right foot and leg cellulitis  Fevers  Leukocytosis  R/O foot abscess and/ or Osteomyelitis    Plan - Cont Vancomycin 1500mgs iv q12hrs   Cont Maxipime 2gms iv q12hrs  await MRI
51 year old male with PMH CVA (10 years ago, no deficits), HTN, DM, CHF (EF 30-35% 2/21), presented with right foot swelling. Patient stated two weeks ago he noticed bleeding in his shoes and found a staple stuck to the bottom of his feet. He didn't notice it before because he states he doesn't have any sensation in his feet.        Plan  d/w pt findings  agree w mri of the foot for  possible foot exploration by podiatry   d/w pt indications  for le angio depending on intra op findings by podiatry and  mri findings  continue current rx and woundcare  Please call Dr MARIAMA Castaneda  to follow  and if any questions

## 2021-10-17 NOTE — CONSULT NOTE ADULT - ATTENDING COMMENTS
JIMENA Peña MD performed a history and physical exam of the patient and discussed  the findings and plan with the house officer. I reviewed the resident note and agree with the findings and plan   I Gutierrez Peña MD have personally seen and examined the patient at bedside today at  9 pm

## 2021-10-17 NOTE — PROGRESS NOTE ADULT - ASSESSMENT
A:  Right foot infection  Cellulitis    P:  Pt seen and evaluated  MRI of right foot - pending  Culture - pending   Evaluated wound R foot  Applied betadine, DSD   Pt to keep dressing clean, dry and intact  Pt to heel wb in surgical shoe to R foot  Pod plan: Pending MRI, waiting for skin demarcation  Continue to monitor, continue LWC   Planning on surgical intervention once MRI comes back   Will follow while in house  Discussed and evaluated at bedside with attending Dr. Kitchen    A:  Right foot infection  Cellulitis    P:  Pt seen and evaluated  MRI of right foot - pending  Culture - pending   Evaluated wound R foot  Applied betadine, DSD   Pt to keep dressing clean, dry and intact  Pt to heel wb in surgical shoe to R foot  Pod plan: Pending MRI, waiting for skin demarcation  Recommend vasc consult - MIGDALIA/PVR shows noncompressible vessels   Continue to monitor, continue LWC   Planning on surgical intervention once MRI comes back   Will follow while in house  Discussed and evaluated at bedside with attending Dr. Kitchen

## 2021-10-17 NOTE — PROGRESS NOTE ADULT - ASSESSMENT
51 year old male with PMH CVA (10 yeas ago, no deficits), HTN, DM, CHF (EF 30-35% 2/21), presented with right foot swelling. Podiatry performed an I/D and pt was admitted for cellulitis     Problem/Plan - 1:  ·  Problem: Cellulitis of right foot.   ·  Plan: -Pt presented after noticing increase in swelling in foot  -Received Keflex course from urgent care, followed by two days of clindamycin   -s/p I &D by podiatry, skin partially removed, yellowish color under 3rd toe, decrease sensation, mild pain to palpation, faint pulses w/ partial darkening 3rd toe   -Afebrile WBC 12.3  -S/P vanc & Zosyn in ED  -Xray: follow up official read  -Will get an MRI to rule out abscess and osteomyelitis   -Will continue with vancomycin and start cefepime as pt is diabetic     Problem/Plan - 2:  ·  Problem: HTN (hypertension).   ·  Plan: pt has a hx of HTN, non compliant with lisinopril 2.5mg at home  -127/78  Continue to monitor for now  hold lisinopril for now.    Problem/Plan - 3:  ·  Problem: Chronic  SystolicHF.   ·  Plan: pt was diagnosed in 2/21 w/ HFrEF  30-35%  Not compliant with medications Coreg 3.125 BID, and Lasix 20mg, lisinopril  CXR: no signs of fluid overload   PT has bilateral pitting edema +1  fluid restrictions  daily weight  strict I/O.    Problem/Plan - 4:  ·  Problem: DM (diabetes mellitus).   ·  Plan: - not compliant with metformin 500mg at home  - will get A1c  -  - diabetic diet  - will start sliding scale  - monitor BS and adjust insulin as needed.    Problem/Plan - 5:  ·  Problem: Hyperlipidemia.   ·  Plan: not compliant with taking medications  Will start his home atorvastatin  DASH diet.    Problem/Plan - 6:  ·  Problem: Anemia.   ·  Plan: Hb 10.4, baseline is 14  MCV 89.5  No signs of bleeding, denies any melena or blood in stools  likely normocytic vs macrocytic anemia  Can follow outpatient for anemia work up  monitor CBC daily.    Problem/Plan - 7:  ·  Problem: Anxiety.   ·  Plan: has hx of anxiety, on xanax at home  c/w home medications PRN.    Problem/Plan - 8:  ·  Problem: Prophylactic measure.   ·  Plan: IMPROVE VTE Individual Risk Assessment   RISK                                                          Points  [  ] Previous VTE                                                3  [  ] Thrombophilia                                             2  [  ] Lower limb paralysis                                    2        (unable to hold up >15 seconds)    [  ] Current Cancer                                             2         (within 6 months)  [  x] Immobilization > 24 hrs                              1  [  ] ICU/CCU stay > 24 hours                            1  [  ] Age > 60                                                    1  IMPROVE VTE Score _________1    Lovenox for DVT prophylaxis.

## 2021-10-18 ENCOUNTER — TRANSCRIPTION ENCOUNTER (OUTPATIENT)
Age: 51
End: 2021-10-18

## 2021-10-18 DIAGNOSIS — Z01.818 ENCOUNTER FOR OTHER PREPROCEDURAL EXAMINATION: ICD-10-CM

## 2021-10-18 LAB
ANION GAP SERPL CALC-SCNC: 6 MMOL/L — SIGNIFICANT CHANGE UP (ref 5–17)
BASOPHILS # BLD AUTO: 0.02 K/UL — SIGNIFICANT CHANGE UP (ref 0–0.2)
BASOPHILS NFR BLD AUTO: 0.2 % — SIGNIFICANT CHANGE UP (ref 0–2)
BUN SERPL-MCNC: 17 MG/DL — SIGNIFICANT CHANGE UP (ref 7–18)
CALCIUM SERPL-MCNC: 9.1 MG/DL — SIGNIFICANT CHANGE UP (ref 8.4–10.5)
CHLORIDE SERPL-SCNC: 100 MMOL/L — SIGNIFICANT CHANGE UP (ref 96–108)
CO2 SERPL-SCNC: 30 MMOL/L — SIGNIFICANT CHANGE UP (ref 22–31)
CREAT SERPL-MCNC: 1.13 MG/DL — SIGNIFICANT CHANGE UP (ref 0.5–1.3)
EOSINOPHIL # BLD AUTO: 0.08 K/UL — SIGNIFICANT CHANGE UP (ref 0–0.5)
EOSINOPHIL NFR BLD AUTO: 0.9 % — SIGNIFICANT CHANGE UP (ref 0–6)
GLUCOSE BLDC GLUCOMTR-MCNC: 205 MG/DL — HIGH (ref 70–99)
GLUCOSE BLDC GLUCOMTR-MCNC: 208 MG/DL — HIGH (ref 70–99)
GLUCOSE BLDC GLUCOMTR-MCNC: 226 MG/DL — HIGH (ref 70–99)
GLUCOSE BLDC GLUCOMTR-MCNC: 269 MG/DL — HIGH (ref 70–99)
GLUCOSE BLDC GLUCOMTR-MCNC: 281 MG/DL — HIGH (ref 70–99)
GLUCOSE SERPL-MCNC: 219 MG/DL — HIGH (ref 70–99)
HCT VFR BLD CALC: 31.8 % — LOW (ref 39–50)
HGB BLD-MCNC: 10.6 G/DL — LOW (ref 13–17)
IMM GRANULOCYTES NFR BLD AUTO: 0.8 % — SIGNIFICANT CHANGE UP (ref 0–1.5)
LYMPHOCYTES # BLD AUTO: 1.11 K/UL — SIGNIFICANT CHANGE UP (ref 1–3.3)
LYMPHOCYTES # BLD AUTO: 12.4 % — LOW (ref 13–44)
MAGNESIUM SERPL-MCNC: 2.1 MG/DL — SIGNIFICANT CHANGE UP (ref 1.6–2.6)
MCHC RBC-ENTMCNC: 29.9 PG — SIGNIFICANT CHANGE UP (ref 27–34)
MCHC RBC-ENTMCNC: 33.3 GM/DL — SIGNIFICANT CHANGE UP (ref 32–36)
MCV RBC AUTO: 89.6 FL — SIGNIFICANT CHANGE UP (ref 80–100)
MONOCYTES # BLD AUTO: 0.57 K/UL — SIGNIFICANT CHANGE UP (ref 0–0.9)
MONOCYTES NFR BLD AUTO: 6.4 % — SIGNIFICANT CHANGE UP (ref 2–14)
NEUTROPHILS # BLD AUTO: 7.07 K/UL — SIGNIFICANT CHANGE UP (ref 1.8–7.4)
NEUTROPHILS NFR BLD AUTO: 79.3 % — HIGH (ref 43–77)
NRBC # BLD: 0 /100 WBCS — SIGNIFICANT CHANGE UP (ref 0–0)
PHOSPHATE SERPL-MCNC: 3.4 MG/DL — SIGNIFICANT CHANGE UP (ref 2.5–4.5)
PLATELET # BLD AUTO: 375 K/UL — SIGNIFICANT CHANGE UP (ref 150–400)
POTASSIUM SERPL-MCNC: 3.7 MMOL/L — SIGNIFICANT CHANGE UP (ref 3.5–5.3)
POTASSIUM SERPL-SCNC: 3.7 MMOL/L — SIGNIFICANT CHANGE UP (ref 3.5–5.3)
RBC # BLD: 3.55 M/UL — LOW (ref 4.2–5.8)
RBC # FLD: 11.9 % — SIGNIFICANT CHANGE UP (ref 10.3–14.5)
SODIUM SERPL-SCNC: 136 MMOL/L — SIGNIFICANT CHANGE UP (ref 135–145)
WBC # BLD: 8.92 K/UL — SIGNIFICANT CHANGE UP (ref 3.8–10.5)
WBC # FLD AUTO: 8.92 K/UL — SIGNIFICANT CHANGE UP (ref 3.8–10.5)

## 2021-10-18 PROCEDURE — 99233 SBSQ HOSP IP/OBS HIGH 50: CPT | Mod: GC

## 2021-10-18 PROCEDURE — 73718 MRI LOWER EXTREMITY W/O DYE: CPT | Mod: 26,RT

## 2021-10-18 PROCEDURE — 99232 SBSQ HOSP IP/OBS MODERATE 35: CPT

## 2021-10-18 RX ORDER — FUROSEMIDE 40 MG
20 TABLET ORAL DAILY
Refills: 0 | Status: DISCONTINUED | OUTPATIENT
Start: 2021-10-18 | End: 2021-10-18

## 2021-10-18 RX ORDER — LANOLIN ALCOHOL/MO/W.PET/CERES
3 CREAM (GRAM) TOPICAL AT BEDTIME
Refills: 0 | Status: DISCONTINUED | OUTPATIENT
Start: 2021-10-18 | End: 2021-10-19

## 2021-10-18 RX ADMIN — Medication 650 MILLIGRAM(S): at 08:27

## 2021-10-18 RX ADMIN — CEFEPIME 100 MILLIGRAM(S): 1 INJECTION, POWDER, FOR SOLUTION INTRAMUSCULAR; INTRAVENOUS at 05:00

## 2021-10-18 RX ADMIN — CEFEPIME 100 MILLIGRAM(S): 1 INJECTION, POWDER, FOR SOLUTION INTRAMUSCULAR; INTRAVENOUS at 18:00

## 2021-10-18 RX ADMIN — Medication 650 MILLIGRAM(S): at 09:27

## 2021-10-18 RX ADMIN — Medication 300 MILLIGRAM(S): at 19:02

## 2021-10-18 RX ADMIN — Medication 300 MILLIGRAM(S): at 05:51

## 2021-10-18 RX ADMIN — Medication 20 MILLIGRAM(S): at 05:51

## 2021-10-18 NOTE — PROGRESS NOTE ADULT - PROBLEM SELECTOR PLAN 5
Not compliant with medications Coreg 3.125 BID, and Lasix 20mg, lisinopril  C/w Lasix 20mg IV on 10/17  C/w Coreg 3.125 daily   Will change to 40mg 10/18  B/l crackles in lower lung bases  Primary cardiologist Dr. Fairchild

## 2021-10-18 NOTE — PROGRESS NOTE ADULT - ASSESSMENT
51 yoM with h/o HTN with arterial calcification, DM neuropathy, R foot wound    MRI pending to r/o OM  pod following, possible surgical intervention depending on MRI findings  MIGDALIA not calculated due to calcification  Vasc will continue to follow

## 2021-10-18 NOTE — PROGRESS NOTE ADULT - PROBLEM SELECTOR PLAN 4
A1c 7  Not taking his metformin at home   C/w SSI, adjust as needed  Added 2units premeal  Add Insulin Sliding Scale 10/18 A1c 7  Not taking his metformin at home   C/w SSI, adjust as needed  Added 2units premeal

## 2021-10-18 NOTE — CHART NOTE - NSCHARTNOTEFT_GEN_A_CORE
Spoke with Surgery  Patient scheduled for surgery tomorrow afternoon  Needs optimization by 10AM or so  NPO after midnight

## 2021-10-18 NOTE — PROGRESS NOTE ADULT - SUBJECTIVE AND OBJECTIVE BOX
Podiatry Interval HPI: Pt seen resting in bed. Pt has been wearing surgical shoe. Pt endorses severe pain to R foot. Admits to chills. No other pedal complaints.      Podiatry HPI: Podiatry consulted regarding a 50 yo male who presents to the ED for a right foot infection. Pt states that he stepped on a brass staple about 2 weeks ago and had no idea it punctured through the skin until the next morning when he started to feel pain. Pt states that he is diabetic so his sensation in his feet is diminished. Pt reports that he went to the urgent care and was prescribed keflex which he finished. He was also told by the urgent care to soak the foot in epsom salt and apply bacitracin cream on it. Pt reports that soaking it made it worse and turned into a infection a few days ago. Pt is complaining 6/10 pain on the right foot. Admits to having chills. Pt denies constitutional symptoms of N/V/C/F/Sob.     HPI: Patient is a 52 y/o male who presents with worsening right foot pain and wound x2 weeks for which he was prescribed keflex course which he completed and a clindamycin course which he just started. Patient has PMH significant for DM, CHF. Patient reports that a "contractor grade staple" punctured his foot and he didn't realize until the next morning when he couldn't bear weight. He reports he is up to date on his tetanus shot. He reports chills but denies fever as he "did not check it" and ascending leg pain at times. Denies shortness of breath, chest pain or pressure, nausea or vomiting.      Medications acetaminophen   Tablet .. 650 milliGRAM(s) Oral every 6 hours PRN  ALPRAZolam 0.5 milliGRAM(s) Oral two times a day PRN  aspirin enteric coated 81 milliGRAM(s) Oral daily  atorvastatin 80 milliGRAM(s) Oral at bedtime  carvedilol 3.125 milliGRAM(s) Oral every 12 hours  cefepime   IVPB 2000 milliGRAM(s) IV Intermittent every 12 hours  dextrose 5%. 1000 milliLiter(s) IV Continuous <Continuous>  dextrose 50% Injectable 25 Gram(s) IV Push once  enoxaparin Injectable 40 milliGRAM(s) SubCutaneous daily  furosemide   Injectable 20 milliGRAM(s) IV Push daily  glucagon  Injectable 1 milliGRAM(s) IntraMuscular once  influenza   Vaccine 0.5 milliLiter(s) IntraMuscular once  insulin lispro (ADMELOG) corrective regimen sliding scale   SubCutaneous Before meals and at bedtime  insulin lispro Injectable (ADMELOG) 2 Unit(s) SubCutaneous three times a day before meals  ondansetron Injectable 4 milliGRAM(s) IV Push every 6 hours PRN  oxycodone    5 mG/acetaminophen 325 mG 2 Tablet(s) Oral once  vancomycin  IVPB 1500 milliGRAM(s) IV Intermittent every 12 hours    FH: No pertinent family history    ,   PMH: CVA (cerebral vascular accident)    HTN (hypertension)    Anxiety    DM (diabetes mellitus)    Chronic CHF       PSH: No pertinent past surgical history        Labs                          10.6   8.92  )-----------( 375      ( 18 Oct 2021 06:58 )             31.8      10-18    136  |  100  |  17  ----------------------------<  219<H>  3.7   |  30  |  1.13    Ca    9.1      18 Oct 2021 06:58  Phos  3.4     10-18  Mg     2.1     10-18       Vital Signs Last 24 Hrs  T(C): 36.3 (18 Oct 2021 05:21), Max: 37.2 (17 Oct 2021 20:33)  T(F): 97.4 (18 Oct 2021 05:21), Max: 98.9 (17 Oct 2021 20:33)  HR: 83 (18 Oct 2021 05:21) (83 - 94)  BP: 111/73 (18 Oct 2021 05:21) (111/73 - 117/79)  BP(mean): --  RR: 17 (18 Oct 2021 05:21) (17 - 18)  SpO2: 100% (18 Oct 2021 05:21) (99% - 100%)  Sedimentation Rate, Erythrocyte: 99 mm/Hr (10-15-21 @ 13:37)         C-Reactive Protein, Serum: 106 mg/L (10-16-21 @ 01:09)  WBC Count: 8.92 K/uL (10-18-21 @ 06:58)      Urinalysis Basic - ( 17 Oct 2021 04:56 )    Color: Yellow / Appearance: Clear / S.020 / pH: x  Gluc: x / Ketone: Negative  / Bili: Negative / Urobili: Negative   Blood: x / Protein: 100 / Nitrite: Negative   Leuk Esterase: Negative / RBC: 0-2 /HPF / WBC 0-2 /HPF   Sq Epi: x / Non Sq Epi: Few /HPF / Bacteria: Negative /HPF      CAPILLARY BLOOD GLUCOSE    POCT Blood Glucose.: 269 mg/dL (18 Oct 2021 07:51)  POCT Blood Glucose.: 247 mg/dL (17 Oct 2021 21:10)  POCT Blood Glucose.: 209 mg/dL (17 Oct 2021 16:22)  POCT Blood Glucose.: 250 mg/dL (17 Oct 2021 11:07)      ROS: All others negative unless otherwise stated in the HPI        PHYSICAL EXAM  GEN: CLARIBEL REICH is a pleasant well-nourished, well developed 51y Male in no acute distress, alert awake, and oriented to person, place and time.   LE Focused:    Vasc: DP/PT pulses faintly palpable, CFT < 5 seconds to digits, TG warm to cool, right 3rd toe felt cool to touch, pitting edema present on bilateral legs  Derm: Cellulitis noted on the dorsum of 3rd digit tracking proximally to midfoot, soft tissue integrity of third digit is macerated and loose, discoloration noted to the 3rd toe and 2nd toe, submet 2/3 of the right foot is macerated with fluctuance noted on the plantar aspect, puncture wound noted to plantar submet 3, no drainage noted   Neuro: Protective sensation grossly diminished  MSK: Able to wiggles toes, pain on palpation to the third digit     10/15: s/p bedside I&D: tracking noted in all directions, probes to bone, serosanguinous drainage noted       Imaging:  EXAM:  XR FOOT 2 VIEWS RT                          PROCEDURE DATE:  10/15/2021        INTERPRETATION:  Cellulitis swollen right foot.    3 views right foot.    No fracture or dislocation. No focal bone lysis or unusual periosteal reaction. Joint spaces preserved. Small calcaneal osteophytes. Vascular calcification. Soft tissue swelling mostly over the dorsum of the foot may reflect cellulitis. No soft tissue gas.    IMPRESSION: No acute or destructive osseous pathology.    If there is clinical suspicion of osteomyelitis consider bone scan or MRI        EXAM:  US PHYSIOL LWR EXT 3+ LEV BI                          PROCEDURE DATE:  10/16/2021      INTERPRETATION:  Clinical indication: Ischemic toe    COMPARISON: None    FINDINGS: There are pulsatile waveforms bilaterally. Segmental pressures could not be obtained due to calcified, noncompressible vessels. Therefore, ABIs could not be calculated.    IMPRESSION: ABIs could not be calculated due to calcified, noncompressible vessels.      Culture  Specimen Source: .Surgical Swab R foot plantar wound (10.16.21 @ 02:28) Culture Results:   Few Streptococcus agalactiae (Group B) isolated   Group B streptococci are susceptible to ampicillin,   penicillin and cefazolin, but may be resistant to   erythromycin and clindamycin.   Recommendations for intrapartum prophylaxis for Group B   streptococci are penicillin or ampicillin. (10.16.21 @ 02:28)

## 2021-10-18 NOTE — PROGRESS NOTE ADULT - SUBJECTIVE AND OBJECTIVE BOX
PGY-1 Progress Note discussed with attending    PAGER #: [[phone]] TILL 5:00 PM  PLEASE CONTACT ON CALL TEAM:  - On Call Team (Please refer to Quintin) FROM 5:00 PM - 8:30PM  - Nightfloat Team FROM 8:30 -7:30 AM    CHIEF COMPLAINT & BRIEF HOSPITAL COURSE:  51 year old male with PMH CVA (10 years ago, no deficits), HTN, DM, CHF (EF 30-35% 2/21), presented with right foot swelling. Patient stated two weeks ago he noticed bleeding in his shoes and found a staple stuck to the bottom of his feet. He didn't notice it before because he states he doesn't have any sensation in his feet. He went to urgent care, where they prescribed him Keflex Q8hrs. Patient states the foot started to swell more so he returned to urgent care yesterday where they gave him a prescription of clindamycin. Patient this morning started to endorse chills, nausea, but denies any fever, vomiting, chest pain, SOB, abdominal pain, or changes in bowel habits.  Patient was admitted in February due to covid, and was found to have CHF and DM. Patient has not been complaint with any of his medications and states he just takes Lasix and Xanax.     INTERVAL HPI/OVERNIGHT EVENTS:   No events overnight. Pt complains of lack of sleep due to AM/overnight draws. Crackles in b/l lung bases are improved, pt denies any SOB.    REVIEW OF SYSTEMS:  CONSTITUTIONAL: No fever, chills, weight loss, or fatigue  RESPIRATORY: +ve dry cough, Denies wheezing, chills or hemoptysis; No shortness of breath  CARDIOVASCULAR: No chest pain, palpitations, dizziness, or leg swelling  GASTROINTESTINAL: No abdominal pain. No vomiting, or hematemesis; No diarrhea or constipation. No melena or hematochezia.  GENITOURINARY: No dysuria or hematuria, urinary frequency  NEUROLOGICAL: +ve numbness of feet b/l No headaches, memory loss, loss of strength  SKIN: Gangrenous 3rd toe of right foot. Mild pain to palpation    Vital Signs Last 24 Hrs  T(C): 36.3 (18 Oct 2021 05:21), Max: 37.2 (17 Oct 2021 20:33)  T(F): 97.4 (18 Oct 2021 05:21), Max: 98.9 (17 Oct 2021 20:33)  HR: 83 (18 Oct 2021 05:21) (83 - 94)  BP: 111/73 (18 Oct 2021 05:21) (111/73 - 117/79)  BP(mean): --  RR: 17 (18 Oct 2021 05:21) (17 - 18)  SpO2: 100% (18 Oct 2021 05:21) (99% - 100%)    PHYSICAL EXAMINATION:  GENERAL: NAD, well built  HEAD:  Atraumatic, Normocephalic  EYES:  conjunctiva and sclera clear  NECK: Supple, No JVD, Normal thyroid  CHEST/LUNG: +ve crackles in b/l lung bases. No rales, rhonchi, wheezing, or rubs  HEART: Regular rate and rhythm; No murmurs, rubs, or gallops  ABDOMEN: Soft, Nontender, Nondistended; Bowel sounds present  NERVOUS SYSTEM:  Alert & Oriented X3,    EXTREMITIES:  2+ Peripheral Pulses, No clubbing, cyanosis, or edema  SKIN: Gangrenous 3rd toe of right foot. skin partially removed, yellowish color under 3rd toe, decrease sensation, mild pain to palpation, faint pulses.               10.6   8.92  )-----------( 375      ( 18 Oct 2021 06:58 )             31.8     10-18    136  |  100  |  17  ----------------------------<  219<H>  3.7   |  30  |  1.13    Ca    9.1      18 Oct 2021 06:58  Phos  3.4     10-18  Mg     2.1     10-18      CAPILLARY BLOOD GLUCOSE      RADIOLOGY & ADDITIONAL TESTS:             < from: [facility] Physiol Extremity Lower 3+ Level, BI (10.16.21 @ 13:08) >  EXAM:  US PHYSIOL LWR EXT 3+ LEV BI                            PROCEDURE DATE:  10/16/2021          INTERPRETATION:  Clinical indication: Ischemic toe    COMPARISON: None    FINDINGS: There are pulsatile waveforms bilaterally. Segmental pressures could not be obtained due to calcified, noncompressible vessels. Therefore, ABIs could not be calculated.    IMPRESSION: ABIs could not be calculated due to calcified, noncompressible vessels.    < end of copied text >    < from: Xray Foot AP + Lateral, Right (10.15.21 @ 15:36) >  EXAM:  XR FOOT 2 VIEWS RT                            PROCEDURE DATE:  10/15/2021          INTERPRETATION:  Cellulitis swollen right foot.    3 views right foot.    No fracture or dislocation. No focal bone lysis or unusual periosteal reaction. Joint spaces preserved. Small calcaneal osteophytes. Vascular calcification. Soft tissue swelling mostly over the dorsum of the foot may reflect cellulitis. No soft tissue gas.    IMPRESSION: No acute or destructive osseous pathology.    If there is clinical suspicion of osteomyelitis consider bone scan or MRI    < end of copied text >         PGY-1 Progress Note discussed with attending    PAGER #: [[phone]] TILL 5:00 PM  PLEASE CONTACT ON CALL TEAM:  - On Call Team (Please refer to Quintin) FROM 5:00 PM - 8:30PM  - Nightfloat Team FROM 8:30 -7:30 AM    CHIEF COMPLAINT & BRIEF HOSPITAL COURSE:  51 year old male with PMH CVA (10 years ago, no deficits), HTN, DM, CHF (EF 30-35% 2/21), presented with right foot swelling. Patient stated two weeks ago he noticed bleeding in his shoes and found a staple stuck to the bottom of his feet. He didn't notice it before because he states he doesn't have any sensation in his feet. He went to urgent care, where they prescribed him Keflex Q8hrs. Patient states the foot started to swell more so he returned to urgent care yesterday where they gave him a prescription of clindamycin. Patient this morning started to endorse chills, nausea, but denies any fever, vomiting, chest pain, SOB, abdominal pain, or changes in bowel habits.  Patient was admitted in February due to covid, and was found to have CHF and DM. Patient has not been complaint with any of his medications and states he just takes Lasix and Xanax.     INTERVAL HPI/OVERNIGHT EVENTS:   No events overnight. Pt complains of lack of sleep due to AM/overnight draws. Crackles in b/l lung bases are improved, pt denies any SOB.    REVIEW OF SYSTEMS:  CONSTITUTIONAL: No fever, chills, weight loss, or fatigue  RESPIRATORY: +ve dry cough, Denies wheezing, chills or hemoptysis; No shortness of breath  CARDIOVASCULAR: No chest pain, palpitations, dizziness, or leg swelling  GASTROINTESTINAL: No abdominal pain. No vomiting, or hematemesis; No diarrhea or constipation. No melena or hematochezia.  GENITOURINARY: No dysuria or hematuria, urinary frequency  NEUROLOGICAL: +ve numbness of feet b/l No headaches, memory loss, loss of strength  SKIN: Gangrenous 3rd toe of right foot. Mild pain to palpation    Vital Signs Last 24 Hrs  T(C): 36.3 (18 Oct 2021 05:21), Max: 37.2 (17 Oct 2021 20:33)  T(F): 97.4 (18 Oct 2021 05:21), Max: 98.9 (17 Oct 2021 20:33)  HR: 83 (18 Oct 2021 05:21) (83 - 94)  BP: 111/73 (18 Oct 2021 05:21) (111/73 - 117/79)  BP(mean): --  RR: 17 (18 Oct 2021 05:21) (17 - 18)  SpO2: 100% (18 Oct 2021 05:21) (99% - 100%)    PHYSICAL EXAMINATION:  GENERAL: NAD, well built  HEAD:  Atraumatic, Normocephalic  EYES:  conjunctiva and sclera clear  NECK: Supple, No JVD, Normal thyroid  CHEST/LUNG: +ve crackles in b/l lung bases. No rales, rhonchi, wheezing, or rubs  HEART: Regular rate and rhythm; No murmurs, rubs, or gallops  ABDOMEN: Soft, Nontender, Nondistended; Bowel sounds present  NERVOUS SYSTEM:  Alert & Oriented X3,    EXTREMITIES:  2+ Peripheral Pulses, No clubbing, cyanosis, or edema  SKIN: Gangrenous 3rd toe of right foot. skin partially removed, yellowish color under 3rd toe, decrease sensation, mild pain to palpation, faint pulses.               10.6   8.92  )-----------( 375      ( 18 Oct 2021 06:58 )             31.8     10-18    136  |  100  |  17  ----------------------------<  219<H>  3.7   |  30  |  1.13    Ca    9.1      18 Oct 2021 06:58  Phos  3.4     10-18  Mg     2.1     10-18      CAPILLARY BLOOD GLUCOSE      RADIOLOGY & ADDITIONAL TESTS:    EXAM:  US PHYSIOL LWR EXT 3+ LEV BI                        PROCEDURE DATE:  10/16/2021    INTERPRETATION:  Clinical indication: Ischemic toe  COMPARISON: None  FINDINGS: There are pulsatile waveforms bilaterally. Segmental pressures could not be obtained due to calcified, noncompressible vessels. Therefore, ABIs could not be calculated.  IMPRESSION: ABIs could not be calculated due to calcified, noncompressible vessels.    EXAM:  XR FOOT 2 VIEWS RT                        PROCEDURE DATE:  10/15/2021    INTERPRETATION:  Cellulitis swollen right foot.  3 views right foot.  No fracture or dislocation. No focal bone lysis or unusual periosteal reaction. Joint spaces preserved. Small calcaneal osteophytes. Vascular calcification. Soft tissue swelling mostly over the dorsum of the foot may reflect cellulitis. No soft tissue gas.  IMPRESSION: No acute or destructive osseous pathology.  If there is clinical suspicion of osteomyelitis consider bone scan or MRI           PGY-1 Progress Note discussed with attending    PAGER #: [[phone]] TILL 5:00 PM  PLEASE CONTACT ON CALL TEAM:  - On Call Team (Please refer to Quintin) FROM 5:00 PM - 8:30PM  - Nightfloat Team FROM 8:30 -7:30 AM    CHIEF COMPLAINT & BRIEF HOSPITAL COURSE:  51 year old male with PMH CVA (10 years ago, no deficits), HTN, DM, CHF (EF 30-35% 2/21), presented with right foot swelling. Patient stated two weeks ago he noticed bleeding in his shoes and found a staple stuck to the bottom of his feet. He didn't notice it before because he states he doesn't have any sensation in his feet. He went to urgent care, where they prescribed him Keflex Q8hrs. Patient states the foot started to swell more so he returned to urgent care yesterday where they gave him a prescription of clindamycin. Patient this morning started to endorse chills, nausea, but denies any fever, vomiting, chest pain, SOB, abdominal pain, or changes in bowel habits.  Patient was admitted in February due to covid, and was found to have CHF and DM. Patient has not been complaint with any of his medications and states he just takes Lasix and Xanax.     INTERVAL HPI/OVERNIGHT EVENTS:   No events overnight. Pt complains of right foot neuropathic pain (4/10) and lack of sleep due to AM/overnight draws. Crackles in b/l lung bases are improved, pt denies any SOB. Pt. is still non-compliant with meds.    REVIEW OF SYSTEMS:  CONSTITUTIONAL: No fever, chills, weight loss, or fatigue  RESPIRATORY: +ve dry cough, Denies wheezing, chills or hemoptysis; No shortness of breath  CARDIOVASCULAR: No chest pain, palpitations, dizziness, or leg swelling  GASTROINTESTINAL: No abdominal pain. No vomiting, or hematemesis; No diarrhea or constipation. No melena or hematochezia.  GENITOURINARY: No dysuria or hematuria, urinary frequency  NEUROLOGICAL: +ve numbness of feet b/l No headaches, memory loss, loss of strength  SKIN: Gangrenous 3rd toe of right foot. Mild pain to palpation    Vital Signs Last 24 Hrs  T(C): 36.3 (18 Oct 2021 05:21), Max: 37.2 (17 Oct 2021 20:33)  T(F): 97.4 (18 Oct 2021 05:21), Max: 98.9 (17 Oct 2021 20:33)  HR: 83 (18 Oct 2021 05:21) (83 - 94)  BP: 111/73 (18 Oct 2021 05:21) (111/73 - 117/79)  BP(mean): --  RR: 17 (18 Oct 2021 05:21) (17 - 18)  SpO2: 100% (18 Oct 2021 05:21) (99% - 100%)    PHYSICAL EXAMINATION:  GENERAL: NAD, well built  HEAD:  Atraumatic, Normocephalic  EYES:  conjunctiva and sclera clear  NECK: Supple, No JVD, Normal thyroid  CHEST/LUNG: +ve crackles in b/l lung bases. No rales, rhonchi, wheezing, or rubs  HEART: Regular rate and rhythm; No murmurs, rubs, or gallops  ABDOMEN: Soft, Nontender, Nondistended; Bowel sounds present  NERVOUS SYSTEM:  Alert & Oriented X3,    EXTREMITIES:  2+ Peripheral Pulses, No clubbing, cyanosis, or edema  SKIN: Gangrenous 3rd toe of right foot. skin partially removed, yellowish color under 3rd toe, decrease sensation, mild pain to palpation, faint pulses.               10.6   8.92  )-----------( 375      ( 18 Oct 2021 06:58 )             31.8     10-18    136  |  100  |  17  ----------------------------<  219<H>  3.7   |  30  |  1.13    Ca    9.1      18 Oct 2021 06:58  Phos  3.4     10-18  Mg     2.1     10-18      CAPILLARY BLOOD GLUCOSE      RADIOLOGY & ADDITIONAL TESTS:    EXAM:  US PHYSIOL LWR EXT 3+ LEV BI                        PROCEDURE DATE:  10/16/2021    INTERPRETATION:  Clinical indication: Ischemic toe  COMPARISON: None  FINDINGS: There are pulsatile waveforms bilaterally. Segmental pressures could not be obtained due to calcified, noncompressible vessels. Therefore, ABIs could not be calculated.  IMPRESSION: ABIs could not be calculated due to calcified, noncompressible vessels.    EXAM:  XR FOOT 2 VIEWS RT                        PROCEDURE DATE:  10/15/2021    INTERPRETATION:  Cellulitis swollen right foot.  3 views right foot.  No fracture or dislocation. No focal bone lysis or unusual periosteal reaction. Joint spaces preserved. Small calcaneal osteophytes. Vascular calcification. Soft tissue swelling mostly over the dorsum of the foot may reflect cellulitis. No soft tissue gas.  IMPRESSION: No acute or destructive osseous pathology.  If there is clinical suspicion of osteomyelitis consider bone scan or MRI           PGY-1 Progress Note discussed with attending    PAGER #: [[phone]] TILL 5:00 PM  PLEASE CONTACT ON CALL TEAM:  - On Call Team (Please refer to Quintin) FROM 5:00 PM - 8:30PM  - Nightfloat Team FROM 8:30 -7:30 AM    CHIEF COMPLAINT & BRIEF HOSPITAL COURSE:  51 year old male with PMH CVA (10 years ago, no deficits), HTN, DM, CHF (EF 30-35% 2/21), presented with right foot swelling. Patient stated two weeks ago he noticed bleeding in his shoes and found a staple stuck to the bottom of his feet. He didn't notice it before because he states he doesn't have any sensation in his feet. He went to urgent care, where they prescribed him Keflex Q8hrs. Patient states the foot started to swell more so he returned to urgent care yesterday where they gave him a prescription of clindamycin. Patient this morning started to endorse chills, nausea, but denies any fever, vomiting, chest pain, SOB, abdominal pain, or changes in bowel habits.  Patient was admitted in February due to covid, and was found to have CHF and DM. Patient has not been complaint with any of his medications and states he just takes Lasix and Xanax.     INTERVAL HPI/OVERNIGHT EVENTS:   No events overnight. Pt complains of right foot neuropathic pain (4/10) and lack of sleep due to AM/overnight draws. Crackles in b/l lung bases have improved, pt denies any SOB. Pt. is still non-compliant with insulin and medications ordered for him in the hospital.    REVIEW OF SYSTEMS:  CONSTITUTIONAL: No fever, chills, weight loss, or fatigue  RESPIRATORY: +ve dry cough, Denies wheezing, chills or hemoptysis; No shortness of breath  CARDIOVASCULAR: No chest pain, palpitations, dizziness, or leg swelling  GASTROINTESTINAL: No abdominal pain. No vomiting, or hematemesis; No diarrhea or constipation. No melena or hematochezia.  GENITOURINARY: No dysuria or hematuria, urinary frequency  NEUROLOGICAL: +ve numbness of feet b/l No headaches, memory loss, loss of strength  SKIN: Gangrenous 3rd toe of right foot. Mild pain to palpation    Vital Signs Last 24 Hrs  T(C): 36.3 (18 Oct 2021 05:21), Max: 37.2 (17 Oct 2021 20:33)  T(F): 97.4 (18 Oct 2021 05:21), Max: 98.9 (17 Oct 2021 20:33)  HR: 83 (18 Oct 2021 05:21) (83 - 94)  BP: 111/73 (18 Oct 2021 05:21) (111/73 - 117/79)  BP(mean): --  RR: 17 (18 Oct 2021 05:21) (17 - 18)  SpO2: 100% (18 Oct 2021 05:21) (99% - 100%)    PHYSICAL EXAMINATION:  GENERAL: NAD, well built  HEAD:  Atraumatic, Normocephalic  EYES:  conjunctiva and sclera clear  NECK: Supple, No JVD, Normal thyroid  CHEST/LUNG: +ve crackles in b/l lung bases. No rales, rhonchi, wheezing, or rubs  HEART: Regular rate and rhythm; No murmurs, rubs, or gallops  ABDOMEN: Soft, Nontender, Nondistended; Bowel sounds present  NERVOUS SYSTEM:  Alert & Oriented X3,    EXTREMITIES:  2+ Peripheral Pulses, No clubbing, cyanosis, or edema  SKIN: Gangrenous 3rd toe of right foot. skin partially removed, yellowish color under 3rd toe, decrease sensation, mild pain to palpation, faint pulses.               10.6   8.92  )-----------( 375      ( 18 Oct 2021 06:58 )             31.8     10-18    136  |  100  |  17  ----------------------------<  219<H>  3.7   |  30  |  1.13    Ca    9.1      18 Oct 2021 06:58  Phos  3.4     10-18  Mg     2.1     10-18      CAPILLARY BLOOD GLUCOSE      RADIOLOGY & ADDITIONAL TESTS:    EXAM:  US PHYSIOL LWR EXT 3+ LEV BI                        PROCEDURE DATE:  10/16/2021    INTERPRETATION:  Clinical indication: Ischemic toe  COMPARISON: None  FINDINGS: There are pulsatile waveforms bilaterally. Segmental pressures could not be obtained due to calcified, noncompressible vessels. Therefore, ABIs could not be calculated.  IMPRESSION: ABIs could not be calculated due to calcified, noncompressible vessels.    EXAM:  XR FOOT 2 VIEWS RT                        PROCEDURE DATE:  10/15/2021    INTERPRETATION:  Cellulitis swollen right foot.  3 views right foot.  No fracture or dislocation. No focal bone lysis or unusual periosteal reaction. Joint spaces preserved. Small calcaneal osteophytes. Vascular calcification. Soft tissue swelling mostly over the dorsum of the foot may reflect cellulitis. No soft tissue gas.  IMPRESSION: No acute or destructive osseous pathology.  If there is clinical suspicion of osteomyelitis consider bone scan or MRI

## 2021-10-18 NOTE — PROGRESS NOTE ADULT - ATTENDING COMMENTS
patient was seen and examined at bedside today  Pain in right foot same as yesterday. anxious about the foot and unable to sleep at night     Vitals noted    P/E:  Right foot 2nd and 3rd toe gangrenous  BL lung crepitations improved     Labs noted     A/P:  Acute OM of 2nd and 3rd toe with suspected abscess  Possible PVD   Chronic CHF  CEDRIC improved   HTN   DM     Plan:  Cont Vanc and Cefepime, follow trough, ID on board   Pending CTA abd aorta with run off. Appreciate consult from Podiatry and [facility] surgery   Monitor renal function closely  Can change Lasix to PO  Had a long discussion with patient for insulin compliance to have better control of sugar. Agreed for Sliding scale and adjustment of insulin tomorrow   Dr. Fairchild primary cardiologist. BP in range. Patient not taking Carvedilol and Lisinopril at home. No w/u for CAD was done   Melatonin at night for sleep  Rest of the management as above patient was seen and examined at bedside today  Pain in right foot same as yesterday. anxious about the foot and unable to sleep at night     Vitals noted    P/E:  Right foot 2nd and 3rd toe gangrenous  BL lung crepitations improved     Labs noted     A/P:  Acute OM of 2nd and 3rd toe with suspected abscess  Possible PVD   Chronic CHF  CEDRIC improved   HTN   DM     Plan:  Cont Vanc and Cefepime, follow trough, ID on board   Pending CT Angio tomorrow. RCRI score 1. 6.0% risk of MACE. Patient is low risk for low risk procedure. No overt sign of heart failure. Use of Lasix buck-operatively depending on the volume status. Will hold am dose tomorrow. Monitor renal function closely.   Appreciate consult from Podiatry and Vasc surgery   Can change Lasix to PO  Had a long discussion with patient for insulin compliance to have better control of sugar. Agreed for Sliding scale and adjustment of insulin tomorrow   Dr. Fairchild primary cardiologist. BP in range. Patient not taking Carvedilol and Lisinopril at home. No w/u for CAD was done   Melatonin at night for sleep  Rest of the management as above patient was seen and examined at bedside today  Pain in right foot same as yesterday. anxious about the foot and unable to sleep at night     Vitals noted    P/E:  Right foot 2nd and 3rd toe gangrenous  BL lung crepitations improved     Labs noted     A/P:  Acute OM of 2nd and 3rd toe with suspected abscess  Possible PVD   Chronic CHF  CEDRIC improved   HTN   DM     Plan:  MRI noted, Cont Vanc and Cefepime, follow vanc trough, ID on board   Pending CT Angio tomorrow. RCRI score 1. 6.0% risk of MACE. Patient is low risk for low risk procedure. No overt sign of heart failure. Use of Lasix buck-operatively depending on the volume status. Will hold am dose tomorrow. Monitor renal function closely.   Appreciate consult from Podiatry and [facility] surgery   Had a long discussion with patient for insulin compliance to have better control of sugar. Agreed for Sliding scale and adjustment of insulin tomorrow   Dr. Fairchild primary cardiologist. BP in range. Patient not taking Carvedilol and Lisinopril at home. No w/u for CAD was done   Melatonin at night for sleep  Rest of the management as above patient was seen and examined at bedside today  Pain in right foot same as yesterday. anxious about the foot and unable to sleep at night     Vitals noted    P/E:  Right foot 2nd and 3rd toe gangrenous  BL lung crepitations improved     Labs noted     A/P:  Acute OM of 2nd and 3rd toe with suspected abscess  Possible PVD   Chronic CHF  CEDRIC improved   HTN   DM     Plan:  MRI noted, Cont Vanc and Cefepime, follow vanc trough, ID on board   Angio tomorrow. RCRI score 1. 6.0% risk of MACE. Patient is low risk for low risk procedure. No overt sign of heart failure. Use of Lasix buck-operatively depending on the volume status. Will hold am dose tomorrow. Monitor renal function closely.   Appreciate consult from Podiatry and [facility] surgery   Had a long discussion with patient for insulin compliance to have better control of sugar. Agreed for Sliding scale and adjustment of insulin tomorrow   Dr. Fairchild primary cardiologist. BP in range. Patient not taking Carvedilol and Lisinopril at home. No w/u for CAD was done   Melatonin at night for sleep  Rest of the management as above

## 2021-10-18 NOTE — PROGRESS NOTE ADULT - ASSESSMENT
A:  Right foot infection  Cellulitis    P:  Pt seen and evaluated  MRI of right foot - pending  Culture report reviewed - Strep B   Evaluated wound R foot  Applied betadine, DSD   Pt to keep dressing clean, dry and intact  Pt to heel wb in surgical shoe to R foot  Pod plan: Pending MRI, waiting for skin demarcation  Appreciate vasc consult   Continue to monitor, continue LWC   Planning on surgical intervention once MRI comes back   Will follow while in house  Discussed and evaluated at bedside with attending Dr. Kitchen    A:  Right foot infection  Cellulitis    P:  Pt seen and evaluated  MRI of right foot - pending  Culture report reviewed - Strep B   Evaluated wound R foot - 3rd toe more discolored, dusky, 2nd toe looks better   Applied betadine, DSD   Pt to keep dressing clean, dry and intact  Pt to heel wb in surgical shoe to R foot  Pod plan: Pending MRI, waiting for skin demarcation, planning on surgical intervention once MRI comes back   Continue to monitor, continue LWC   Appreciate vasc consult   Will follow while in house  Discussed and evaluated at bedside with attending Dr. Kitchen

## 2021-10-18 NOTE — CHART NOTE - NSCHARTNOTEFT_GEN_A_CORE
Patient seen and examined at bedside with Dr. Castaneda. Plan for right lower extremity angiogram tomorrow 10/19. NPO at midnight, preop labs, active covid swab, please document medical clearance in chart, consent to be obtained.

## 2021-10-18 NOTE — PROGRESS NOTE ADULT - ATTENDING COMMENTS
Asked by Dr. Peña to fu pt.  Athero/PAD/R foot path as above  MIGDALIA/PVRs: confirm athero but w decent perfusion.  Rec:   [facility] podiatry plans incl I&D if needed for infection control.  Check CTA aorta/runoff for arterial anatomy eval and revasc options if become necessary. Asked by Dr. Peña to fu pt.  Athero/PAD/R foot path as above  MIGDALIA/PVRs: confirm athero but w decent perfusion.  Rec:   [facility] podiatry plans incl I&D if needed for infection control.  Check CTA aorta/runoff for arterial anatomy eval and revasc options if become necessary.    *** ADdend: pt reex'ed w PA  19:00  R foot infection x ~2wks from plantar puncture wound  No prev vasc h/o  Non smoker  DM, CAD, CHF, CVA (carotids ok per pt)  R foot pain/swelling improving    Abd no pulsatillty  L LE skin intact, +fem/pop/DP  R LE: severe isch changes dist foot/toes.  No asc infection  +R fem/pop.  Pedals NP    xray: athero  ABIs: athero, decent waves    Plan: as above x defer CTA.  Plan angio +/- revasc tomorrow afternoon if ok from med/cardio (NPO post breaksfast)

## 2021-10-18 NOTE — PROGRESS NOTE ADULT - SUBJECTIVE AND OBJECTIVE BOX
INTERVAL HPI/OVERNIGHT EVENTS:    No acute events overnight.   Pt resting comfortably. No acute complaints.   denies f/c/n/v    MEDICATIONS  (STANDING):  aspirin enteric coated 81 milliGRAM(s) Oral daily  atorvastatin 80 milliGRAM(s) Oral at bedtime  carvedilol 3.125 milliGRAM(s) Oral every 12 hours  cefepime   IVPB 2000 milliGRAM(s) IV Intermittent every 12 hours  dextrose 5%. 1000 milliLiter(s) (100 mL/Hr) IV Continuous <Continuous>  dextrose 50% Injectable 25 Gram(s) IV Push once  enoxaparin Injectable 40 milliGRAM(s) SubCutaneous daily  furosemide   Injectable 20 milliGRAM(s) IV Push daily  glucagon  Injectable 1 milliGRAM(s) IntraMuscular once  influenza   Vaccine 0.5 milliLiter(s) IntraMuscular once  insulin lispro (ADMELOG) corrective regimen sliding scale   SubCutaneous Before meals and at bedtime  insulin lispro Injectable (ADMELOG) 2 Unit(s) SubCutaneous three times a day before meals  oxycodone    5 mG/acetaminophen 325 mG 2 Tablet(s) Oral once  vancomycin  IVPB 1500 milliGRAM(s) IV Intermittent every 12 hours    MEDICATIONS  (PRN):  acetaminophen   Tablet .. 650 milliGRAM(s) Oral every 6 hours PRN Temp greater or equal to 38C (100.4F), Moderate Pain (4 - 6)  ALPRAZolam 0.5 milliGRAM(s) Oral two times a day PRN anxiety  ondansetron Injectable 4 milliGRAM(s) IV Push every 6 hours PRN Nausea and/or Vomiting      Vital Signs Last 24 Hrs  T(C): 36.3 (18 Oct 2021 05:21), Max: 37.2 (17 Oct 2021 20:33)  T(F): 97.4 (18 Oct 2021 05:21), Max: 98.9 (17 Oct 2021 20:33)  HR: 83 (18 Oct 2021 05:21) (83 - 94)  BP: 111/73 (18 Oct 2021 05:21) (111/73 - 117/79)  BP(mean): --  RR: 17 (18 Oct 2021 05:21) (17 - 18)  SpO2: 100% (18 Oct 2021 05:21) (99% - 100%)      PHYSICAL EXAM  General: Alert and oriented, not in acute distress  Resp: Breathing unlabored  Ext: R foot dressed, no s/m/n/v deficit, nontender      I&O's Detail    17 Oct 2021 07:01  -  18 Oct 2021 07:00  --------------------------------------------------------  IN:  Total IN: 0 mL    OUT:    Voided (mL): 3150 mL  Total OUT: 3150 mL    Total NET: -3150 mL          LABS:                        10.6   8.92  )-----------( 375      ( 18 Oct 2021 06:58 )             31.8             10-18    136  |  100  |  17  ----------------------------<  219<H>  3.7   |  30  |  1.13    Ca    9.1      18 Oct 2021 06:58  Phos  3.4     10-18  Mg     2.1     10-18      < from: [facility] Physiol Extremity Lower 3+ Level, BI (10.16.21 @ 13:08) >  INTERPRETATION:  Clinical indication: Ischemic toe    COMPARISON: None    FINDINGS: There are pulsatile waveforms bilaterally. Segmental pressures could not be obtained due to calcified, noncompressible vessels. Therefore, ABIs could not be calculated.    IMPRESSION: ABIs could not be calculated due to calcified, noncompressible vessels.    < end of copied text >

## 2021-10-18 NOTE — PROGRESS NOTE ADULT - PROBLEM SELECTOR PLAN 1
Cont Vanc and Cefepime  F/u Trough 10/19 5am  Dr Mesa - ID consulted  Pending MRI 10/18 foot to r/o abscess or OM  MIGDALIA/PVR shows noncompressible vessels   Podiatry consulted Cont Vanc and Cefepime  F/u Trough 10/19 5am  Dr Mesa - ID consulted  Cx: Few Streptococcus agalactiae (Group B) isolated sensitive to Ampicillin and Penicillin  Pending MRI 10/18 foot to r/o abscess or OM  MIGDALIA/PVR shows noncompressible vessels   Podiatry consulted,   Vascular consulted, possible surgical intervention depending on MRI findings Cont Vanc and Cefepime  F/u Trough 10/19 5am  Dr Mesa - ID consulted  Cx: Few Streptococcus agalactiae (Group B) isolated sensitive to Ampicillin and Penicillin  MRI:  Findings favored to represent osteomyelitis involving second and third digit proximal phalanges.  Extensive overlying soft tissue infection involving the second and third digits with surrounding edema and phlegmon, may represent abscess-in-formation. Additionally, multiple rounded foci of hypointensity in soft tissue at the second and third digits may be related to vascular atherosclerosis versus foci of soft tissue gas.  MIGDALIA/PVR shows noncompressible vessels   Podiatry consulted.  F/u CTA Aorta w/ runoff  Vascular consulted, possible surgical intervention

## 2021-10-19 ENCOUNTER — TRANSCRIPTION ENCOUNTER (OUTPATIENT)
Age: 51
End: 2021-10-19

## 2021-10-19 DIAGNOSIS — I73.9 PERIPHERAL VASCULAR DISEASE, UNSPECIFIED: ICD-10-CM

## 2021-10-19 LAB
-  AMPICILLIN/SULBACTAM: SIGNIFICANT CHANGE UP
-  CEFAZOLIN: SIGNIFICANT CHANGE UP
-  CLINDAMYCIN: SIGNIFICANT CHANGE UP
-  ERYTHROMYCIN: SIGNIFICANT CHANGE UP
-  GENTAMICIN: SIGNIFICANT CHANGE UP
-  OXACILLIN: SIGNIFICANT CHANGE UP
-  RIFAMPIN: SIGNIFICANT CHANGE UP
-  TETRACYCLINE: SIGNIFICANT CHANGE UP
-  TRIMETHOPRIM/SULFAMETHOXAZOLE: SIGNIFICANT CHANGE UP
-  VANCOMYCIN: SIGNIFICANT CHANGE UP
ANION GAP SERPL CALC-SCNC: 4 MMOL/L — LOW (ref 5–17)
ANION GAP SERPL CALC-SCNC: 5 MMOL/L — SIGNIFICANT CHANGE UP (ref 5–17)
APTT BLD: 33.7 SEC — SIGNIFICANT CHANGE UP (ref 27.5–35.5)
BLD GP AB SCN SERPL QL: SIGNIFICANT CHANGE UP
BUN SERPL-MCNC: 14 MG/DL — SIGNIFICANT CHANGE UP (ref 7–18)
BUN SERPL-MCNC: 15 MG/DL — SIGNIFICANT CHANGE UP (ref 7–18)
CALCIUM SERPL-MCNC: 8.8 MG/DL — SIGNIFICANT CHANGE UP (ref 8.4–10.5)
CALCIUM SERPL-MCNC: 9 MG/DL — SIGNIFICANT CHANGE UP (ref 8.4–10.5)
CHLORIDE SERPL-SCNC: 101 MMOL/L — SIGNIFICANT CHANGE UP (ref 96–108)
CHLORIDE SERPL-SCNC: 102 MMOL/L — SIGNIFICANT CHANGE UP (ref 96–108)
CO2 SERPL-SCNC: 31 MMOL/L — SIGNIFICANT CHANGE UP (ref 22–31)
CO2 SERPL-SCNC: 31 MMOL/L — SIGNIFICANT CHANGE UP (ref 22–31)
CREAT SERPL-MCNC: 1.04 MG/DL — SIGNIFICANT CHANGE UP (ref 0.5–1.3)
CREAT SERPL-MCNC: 1.05 MG/DL — SIGNIFICANT CHANGE UP (ref 0.5–1.3)
CULTURE RESULTS: SIGNIFICANT CHANGE UP
GLUCOSE BLDC GLUCOMTR-MCNC: 153 MG/DL — HIGH (ref 70–99)
GLUCOSE BLDC GLUCOMTR-MCNC: 175 MG/DL — HIGH (ref 70–99)
GLUCOSE BLDC GLUCOMTR-MCNC: 191 MG/DL — HIGH (ref 70–99)
GLUCOSE BLDC GLUCOMTR-MCNC: 210 MG/DL — HIGH (ref 70–99)
GLUCOSE SERPL-MCNC: 173 MG/DL — HIGH (ref 70–99)
GLUCOSE SERPL-MCNC: 186 MG/DL — HIGH (ref 70–99)
HCT VFR BLD CALC: 31.2 % — LOW (ref 39–50)
HGB BLD-MCNC: 10.5 G/DL — LOW (ref 13–17)
INR BLD: 1.34 RATIO — HIGH (ref 0.88–1.16)
MCHC RBC-ENTMCNC: 30.2 PG — SIGNIFICANT CHANGE UP (ref 27–34)
MCHC RBC-ENTMCNC: 33.7 GM/DL — SIGNIFICANT CHANGE UP (ref 32–36)
MCV RBC AUTO: 89.7 FL — SIGNIFICANT CHANGE UP (ref 80–100)
METHOD TYPE: SIGNIFICANT CHANGE UP
NRBC # BLD: 0 /100 WBCS — SIGNIFICANT CHANGE UP (ref 0–0)
ORGANISM # SPEC MICROSCOPIC CNT: SIGNIFICANT CHANGE UP
ORGANISM # SPEC MICROSCOPIC CNT: SIGNIFICANT CHANGE UP
PLATELET # BLD AUTO: 380 K/UL — SIGNIFICANT CHANGE UP (ref 150–400)
POTASSIUM SERPL-MCNC: 3.8 MMOL/L — SIGNIFICANT CHANGE UP (ref 3.5–5.3)
POTASSIUM SERPL-MCNC: 3.8 MMOL/L — SIGNIFICANT CHANGE UP (ref 3.5–5.3)
POTASSIUM SERPL-SCNC: 3.8 MMOL/L — SIGNIFICANT CHANGE UP (ref 3.5–5.3)
POTASSIUM SERPL-SCNC: 3.8 MMOL/L — SIGNIFICANT CHANGE UP (ref 3.5–5.3)
PROTHROM AB SERPL-ACNC: 15.7 SEC — HIGH (ref 10.6–13.6)
RBC # BLD: 3.48 M/UL — LOW (ref 4.2–5.8)
RBC # FLD: 12 % — SIGNIFICANT CHANGE UP (ref 10.3–14.5)
SARS-COV-2 RNA SPEC QL NAA+PROBE: SIGNIFICANT CHANGE UP
SODIUM SERPL-SCNC: 137 MMOL/L — SIGNIFICANT CHANGE UP (ref 135–145)
SODIUM SERPL-SCNC: 137 MMOL/L — SIGNIFICANT CHANGE UP (ref 135–145)
SPECIMEN SOURCE: SIGNIFICANT CHANGE UP
VANCOMYCIN TROUGH SERPL-MCNC: 17.8 UG/ML — SIGNIFICANT CHANGE UP (ref 10–20)
WBC # BLD: 9.09 K/UL — SIGNIFICANT CHANGE UP (ref 3.8–10.5)
WBC # FLD AUTO: 9.09 K/UL — SIGNIFICANT CHANGE UP (ref 3.8–10.5)

## 2021-10-19 PROCEDURE — 76937 US GUIDE VASCULAR ACCESS: CPT | Mod: 26,GC

## 2021-10-19 PROCEDURE — 36200 PLACE CATHETER IN AORTA: CPT | Mod: RT,GC

## 2021-10-19 PROCEDURE — 99233 SBSQ HOSP IP/OBS HIGH 50: CPT | Mod: GC

## 2021-10-19 PROCEDURE — 75625 CONTRAST EXAM ABDOMINL AORTA: CPT | Mod: 26,GC

## 2021-10-19 RX ORDER — FENTANYL CITRATE 50 UG/ML
50 INJECTION INTRAVENOUS
Refills: 0 | Status: DISCONTINUED | OUTPATIENT
Start: 2021-10-19 | End: 2021-10-19

## 2021-10-19 RX ORDER — DEXTROSE 50 % IN WATER 50 %
25 SYRINGE (ML) INTRAVENOUS ONCE
Refills: 0 | Status: DISCONTINUED | OUTPATIENT
Start: 2021-10-19 | End: 2021-10-20

## 2021-10-19 RX ORDER — VANCOMYCIN HCL 1 G
1500 VIAL (EA) INTRAVENOUS EVERY 12 HOURS
Refills: 0 | Status: DISCONTINUED | OUTPATIENT
Start: 2021-10-19 | End: 2021-10-20

## 2021-10-19 RX ORDER — FENTANYL CITRATE 50 UG/ML
25 INJECTION INTRAVENOUS
Refills: 0 | Status: DISCONTINUED | OUTPATIENT
Start: 2021-10-19 | End: 2021-10-19

## 2021-10-19 RX ORDER — ACETAMINOPHEN 500 MG
650 TABLET ORAL EVERY 6 HOURS
Refills: 0 | Status: DISCONTINUED | OUTPATIENT
Start: 2021-10-19 | End: 2021-10-20

## 2021-10-19 RX ORDER — ATORVASTATIN CALCIUM 80 MG/1
80 TABLET, FILM COATED ORAL AT BEDTIME
Refills: 0 | Status: DISCONTINUED | OUTPATIENT
Start: 2021-10-19 | End: 2021-10-20

## 2021-10-19 RX ORDER — SODIUM CHLORIDE 9 MG/ML
1000 INJECTION, SOLUTION INTRAVENOUS
Refills: 0 | Status: DISCONTINUED | OUTPATIENT
Start: 2021-10-19 | End: 2021-10-19

## 2021-10-19 RX ORDER — INSULIN LISPRO 100/ML
VIAL (ML) SUBCUTANEOUS
Refills: 0 | Status: DISCONTINUED | OUTPATIENT
Start: 2021-10-19 | End: 2021-10-20

## 2021-10-19 RX ORDER — ASPIRIN/CALCIUM CARB/MAGNESIUM 324 MG
81 TABLET ORAL DAILY
Refills: 0 | Status: DISCONTINUED | OUTPATIENT
Start: 2021-10-19 | End: 2021-10-20

## 2021-10-19 RX ORDER — ONDANSETRON 8 MG/1
4 TABLET, FILM COATED ORAL ONCE
Refills: 0 | Status: DISCONTINUED | OUTPATIENT
Start: 2021-10-19 | End: 2021-10-19

## 2021-10-19 RX ORDER — GLUCAGON INJECTION, SOLUTION 0.5 MG/.1ML
1 INJECTION, SOLUTION SUBCUTANEOUS ONCE
Refills: 0 | Status: DISCONTINUED | OUTPATIENT
Start: 2021-10-19 | End: 2021-10-20

## 2021-10-19 RX ORDER — LANOLIN ALCOHOL/MO/W.PET/CERES
3 CREAM (GRAM) TOPICAL AT BEDTIME
Refills: 0 | Status: DISCONTINUED | OUTPATIENT
Start: 2021-10-19 | End: 2021-10-20

## 2021-10-19 RX ORDER — ONDANSETRON 8 MG/1
4 TABLET, FILM COATED ORAL EVERY 6 HOURS
Refills: 0 | Status: DISCONTINUED | OUTPATIENT
Start: 2021-10-19 | End: 2021-10-20

## 2021-10-19 RX ORDER — SODIUM CHLORIDE 9 MG/ML
1000 INJECTION, SOLUTION INTRAVENOUS
Refills: 0 | Status: DISCONTINUED | OUTPATIENT
Start: 2021-10-19 | End: 2021-10-20

## 2021-10-19 RX ORDER — ALPRAZOLAM 0.25 MG
0.5 TABLET ORAL
Refills: 0 | Status: DISCONTINUED | OUTPATIENT
Start: 2021-10-19 | End: 2021-10-20

## 2021-10-19 RX ORDER — ACETAMINOPHEN 500 MG
1000 TABLET ORAL ONCE
Refills: 0 | Status: DISCONTINUED | OUTPATIENT
Start: 2021-10-19 | End: 2021-10-19

## 2021-10-19 RX ORDER — INSULIN LISPRO 100/ML
2 VIAL (ML) SUBCUTANEOUS
Refills: 0 | Status: DISCONTINUED | OUTPATIENT
Start: 2021-10-19 | End: 2021-10-20

## 2021-10-19 RX ADMIN — FENTANYL CITRATE 25 MICROGRAM(S): 50 INJECTION INTRAVENOUS at 22:30

## 2021-10-19 RX ADMIN — Medication 300 MILLIGRAM(S): at 23:11

## 2021-10-19 RX ADMIN — FENTANYL CITRATE 25 MICROGRAM(S): 50 INJECTION INTRAVENOUS at 22:15

## 2021-10-19 RX ADMIN — Medication 300 MILLIGRAM(S): at 08:25

## 2021-10-19 RX ADMIN — CEFEPIME 100 MILLIGRAM(S): 1 INJECTION, POWDER, FOR SOLUTION INTRAMUSCULAR; INTRAVENOUS at 06:15

## 2021-10-19 NOTE — PROGRESS NOTE ADULT - SUBJECTIVE AND OBJECTIVE BOX
Podiatry Interval HPI: Pt seen resting in bed. Pt has been wearing surgical shoe. Pt endorses severe pain to R foot. Admits to chills. No other pedal complaints.      Podiatry HPI: Podiatry consulted regarding a 52 yo male who presents to the ED for a right foot infection. Pt states that he stepped on a brass staple about 2 weeks ago and had no idea it punctured through the skin until the next morning when he started to feel pain. Pt states that he is diabetic so his sensation in his feet is diminished. Pt reports that he went to the urgent care and was prescribed keflex which he finished. He was also told by the urgent care to soak the foot in epsom salt and apply bacitracin cream on it. Pt reports that soaking it made it worse and turned into a infection a few days ago. Pt is complaining 6/10 pain on the right foot. Admits to having chills. Pt denies constitutional symptoms of N/V/C/F/Sob.     HPI: Patient is a 52 y/o male who presents with worsening right foot pain and wound x2 weeks for which he was prescribed keflex course which he completed and a clindamycin course which he just started. Patient has PMH significant for DM, CHF. Patient reports that a "contractor grade staple" punctured his foot and he didn't realize until the next morning when he couldn't bear weight. He reports he is up to date on his tetanus shot. He reports chills but denies fever as he "did not check it" and ascending leg pain at times. Denies shortness of breath, chest pain or pressure, nausea or vomiting.      Medications acetaminophen   Tablet .. 650 milliGRAM(s) Oral every 6 hours PRN  ALPRAZolam 0.5 milliGRAM(s) Oral two times a day PRN  aspirin enteric coated 81 milliGRAM(s) Oral daily  atorvastatin 80 milliGRAM(s) Oral at bedtime  cefepime   IVPB 2000 milliGRAM(s) IV Intermittent every 12 hours  dextrose 5%. 1000 milliLiter(s) IV Continuous <Continuous>  dextrose 50% Injectable 25 Gram(s) IV Push once  glucagon  Injectable 1 milliGRAM(s) IntraMuscular once  influenza   Vaccine 0.5 milliLiter(s) IntraMuscular once  insulin lispro (ADMELOG) corrective regimen sliding scale   SubCutaneous Before meals and at bedtime  insulin lispro Injectable (ADMELOG) 2 Unit(s) SubCutaneous three times a day before meals  melatonin 3 milliGRAM(s) Oral at bedtime  ondansetron Injectable 4 milliGRAM(s) IV Push every 6 hours PRN  oxycodone    5 mG/acetaminophen 325 mG 2 Tablet(s) Oral once  vancomycin  IVPB 1500 milliGRAM(s) IV Intermittent every 12 hours    FH: No pertinent family history    PMH: CVA (cerebral vascular accident)    HTN (hypertension)    Anxiety    DM (diabetes mellitus)    Chronic CHF       PSH: No pertinent past surgical history      Labs                          10.5   9.09  )-----------( 380      ( 19 Oct 2021 08:22 )             31.2      10-19    137  |  101  |  14  ----------------------------<  173<H>  3.8   |  31  |  1.05    Ca    8.8      19 Oct 2021 08:22  Phos  3.4     10-18  Mg     2.1     10-18       Vital Signs Last 24 Hrs  T(C): 36.7 (19 Oct 2021 05:39), Max: 36.8 (18 Oct 2021 19:33)  T(F): 98 (19 Oct 2021 05:39), Max: 98.2 (18 Oct 2021 19:33)  HR: 85 (19 Oct 2021 05:39) (85 - 97)  BP: 108/70 (19 Oct 2021 05:39) (108/70 - 124/85)  BP(mean): --  RR: 17 (19 Oct 2021 05:39) (17 - 18)  SpO2: 99% (19 Oct 2021 05:39) (99% - 100%)  Sedimentation Rate, Erythrocyte: 99 mm/Hr (10-15-21 @ 13:37)         C-Reactive Protein, Serum: 106 mg/L (10-16-21 @ 01:09)  WBC Count: 9.09 K/uL (10-19-21 @ 08:22)    PT/INR - ( 19 Oct 2021 08:22 )   PT: 15.7 sec;   INR: 1.34 ratio         PTT - ( 19 Oct 2021 08:22 )  PTT:33.7 sec    CAPILLARY BLOOD GLUCOSE      POCT Blood Glucose.: 175 mg/dL (19 Oct 2021 07:39)  POCT Blood Glucose.: 226 mg/dL (18 Oct 2021 22:23)  POCT Blood Glucose.: 281 mg/dL (18 Oct 2021 21:10)  POCT Blood Glucose.: 208 mg/dL (18 Oct 2021 16:25)  POCT Blood Glucose.: 205 mg/dL (18 Oct 2021 11:31)      ROS: All others negative unless otherwise stated in the HPI        PHYSICAL EXAM  GEN: CLARIBEL REICH is a pleasant well-nourished, well developed 51y Male in no acute distress, alert awake, and oriented to person, place and time.   LE Focused:    Vasc: DP/PT pulses faintly palpable, CFT < 5 seconds to digits, TG warm to cool, right 3rd toe felt cool to touch, pitting edema present on bilateral legs  Derm: Cellulitis noted on the dorsum of 3rd digit tracking proximally to midfoot, soft tissue integrity of third digit is macerated and loose, discoloration noted to the 3rd toe and 2nd toe, submet 2/3 of the right foot is macerated with fluctuance noted on the plantar aspect, puncture wound noted to plantar submet 3, no drainage noted   Neuro: Protective sensation grossly diminished  MSK: Able to wiggles toes, pain on palpation to the third digit     10/15: s/p bedside I&D: tracking noted in all directions, probes to bone, serosanguinous drainage noted       Imaging:    EXAM:  MR FOOT RT                          PROCEDURE DATE:  10/18/2021      INTERPRETATION:  EXAMINATION: MR FOOT RIGHT    CLINICAL INDICATION:Evaluate for osteomyelitis    COMPARISON: Radiographs dated 10/15/2021    TECHNIQUE: Multiplanar, multi-sequence MRI of the right foot was performed without intravenous contrast.    INTERPRETATION:    Bones and soft tissues: There is no fracture. There is patchy STIR hyperintensity within the second digit proximal phalanx with mild patchy T1 hypointensity seen on the short axis TI images. Similar finding is seen at the base of the third digit proximal phalanx. The findings are favored to represent early osteomyelitis, with reactive osteitis considered less likely.    There is extensive surrounding soft tissue edema and phlegmon at the second and third digits which may represent abscess-in -formation with blistering of the overlying skin. Multiple foci of hypointensity seen on series 6 and 7 at the second and third digits may be related to vascular atherosclerosis versus foci of soft tissue gas. Advise radiographic correlation (prior radiographs are dated 10/15/2021).    Muscles: There is extensive edema within the intrinsic muscles of the foot which may be seen in neuropathy.    IMPRESSION:  1.  Findings favored to represent osteomyelitis involving second and third digit proximal phalanges.    2.  Extensive overlying soft tissue infection involving the second and third digits with surrounding edema and phlegmon, may represent abscess-in-formation. Additionally, multiple rounded foci of hypointensity in soft tissue at the second and third digits may be related to vascular atherosclerosis versus foci of soft tissue gas. Advise radiographic correlation (prior radiographs are dated 10/15/2021).      EXAM:  XR FOOT 2 VIEWS RT                          PROCEDURE DATE:  10/15/2021        INTERPRETATION:  Cellulitis swollen right foot.    3 views right foot.    No fracture or dislocation. No focal bone lysis or unusual periosteal reaction. Joint spaces preserved. Small calcaneal osteophytes. Vascular calcification. Soft tissue swelling mostly over the dorsum of the foot may reflect cellulitis. No soft tissue gas.    IMPRESSION: No acute or destructive osseous pathology.    If there is clinical suspicion of osteomyelitis consider bone scan or MRI        EXAM:  US PHYSIOL LWR EXT 3+ LEV BI                          PROCEDURE DATE:  10/16/2021      INTERPRETATION:  Clinical indication: Ischemic toe    COMPARISON: None    FINDINGS: There are pulsatile waveforms bilaterally. Segmental pressures could not be obtained due to calcified, noncompressible vessels. Therefore, ABIs could not be calculated.    IMPRESSION: ABIs could not be calculated due to calcified, noncompressible vessels.      Culture  Specimen Source: .Surgical Swab R foot plantar wound (10.16.21 @ 02:28) Culture Results:   Few Streptococcus agalactiae (Group B) isolated   Group B streptococci are susceptible to ampicillin,   penicillin and cefazolin, but may be resistant to   erythromycin and clindamycin.   Recommendations for intrapartum prophylaxis for Group B   streptococci are penicillin or ampicillin. (10.16.21 @ 02:28)

## 2021-10-19 NOTE — PROGRESS NOTE ADULT - SUBJECTIVE AND OBJECTIVE BOX
Angio:   Inline flow to R foot via dom PT and smaller peroneal.  AT occ.  Optimized from vasc standpoint.  [facility] podiatry plans  Med mgmt of athero  WIll need outpt vasc surveillance.

## 2021-10-19 NOTE — PROGRESS NOTE ADULT - ASSESSMENT
A:  Right foot infection  Cellulitis    P:  Pt seen and evaluated  MRI of right foot reviewed - shows OM of the 2nd and 3rd proximal phalanx with abscess in formation   Culture report reviewed - Strep B   Evaluated wound R foot - 3rd toe more discolored, dusky, 2nd toe looks better   Applied betadine, DSD   Pt to keep dressing clean, dry and intact  Pt to heel wb in surgical shoe to R foot  Pod plan: Scheduled for R 3rd toe amputation, possible 2nd toe amp on Wednesday (10/19) at 12 PM with Dr. Kitchen   Request medical clearance   Covid CPR ordered  NPO after midnight   Will follow while in house  Discussed with attending Dr. Kitchen

## 2021-10-19 NOTE — PROGRESS NOTE ADULT - PROBLEM SELECTOR PLAN 5
Not compliant with medications Coreg 3.125 BID, and Lasix 20mg, lisinopril  C/w Lasix 20mg IV on 10/17  C/w Coreg 3.125 daily   Will change to 40mg 10/18  Primary cardiologist Dr. Fairchild  Echo ordered 10/19 Not compliant with medications Coreg 3.125 BID, and Lasix 20mg, lisinopril  C/w Coreg 3.125 daily   Will change to 40mg 10/18  Primary cardiologist Dr. Fairchild  Echo ordered 10/19  Held Lasix dose in 10/19 will resume after angiogram

## 2021-10-19 NOTE — PROGRESS NOTE ADULT - PROBLEM SELECTOR PLAN 1
Cont Vanc and Cefepime  Vanc Trough 10/19 17.8  Dr Mesa - ID consulted  Cx: Few Streptococcus agalactiae (Group B) isolated sensitive to Ampicillin and Penicillin  MRI:  Findings favored to represent osteomyelitis involving second and third digit proximal phalanges.  Extensive overlying soft tissue infection involving the second and third digits with surrounding edema and phlegmon, may represent abscess-in-formation. Additionally, multiple rounded foci of hypointensity in soft tissue at the second and third digits may be related to vascular atherosclerosis versus foci of soft tissue gas.  MIGDALIA/PVR shows noncompressible vessels   Podiatry consulted.  Vascular consulted - pt. scheduled for CTA 10/19 Cont Vanc and Cefepime  Vanc Trough 10/19 17.8  F/u Vanc Trough 10/21  Dr Mesa - ID consulted  Cx: Few Streptococcus agalactiae (Group B) isolated sensitive to Ampicillin and Penicillin  MRI:  Findings favored to represent osteomyelitis involving second and third digit proximal phalanges.  Extensive overlying soft tissue infection involving the second and third digits with surrounding edema and phlegmon, may represent abscess-in-formation. Additionally, multiple rounded foci of hypointensity in soft tissue at the second and third digits may be related to vascular atherosclerosis versus foci of soft tissue gas.  MIGDALIA/PVR shows noncompressible vessels   Podiatry consulted.  Vascular consulted - pt. scheduled for OR for Angiogram

## 2021-10-19 NOTE — PROGRESS NOTE ADULT - ATTENDING COMMENTS
Patient seen and examined on bedside.    Vital Signs Last 24 Hrs  T(C): 36.8 (19 Oct 2021 17:27), Max: 36.8 (18 Oct 2021 19:33)  T(F): 98.3 (19 Oct 2021 17:27), Max: 98.3 (19 Oct 2021 17:27)  HR: 98 (19 Oct 2021 17:27) (85 - 98)  BP: 129/87 (19 Oct 2021 17:27) (108/70 - 129/87)  BP(mean): 85 (19 Oct 2021 13:20) (85 - 85)  RR: 16 (19 Oct 2021 17:27) (16 - 18)  SpO2: 100% (19 Oct 2021 17:27) (99% - 100%)    Labs and imaging studies noted.    Assessment and plan:     A/P:  Acute OM of 2nd and 3rd toe with suspected abscess  Possible PVD   Chronic CHF  CEDRIC improved   HTN   DM     Plan:  MRI noted showed OM, Cont Vanc and Cefepime, follow vanc trough, ID on board   MIGDALIA/PVR showed non compressible vessels. Vascular surgery on board. CT Angio today, will follow.   Podiatry planning for amputation of right 2nd toe possible 3 rd toe amputation.   RCRI score 1. 6.0% risk of MACE. Patient is low risk for low risk procedure including toe amputation surgery. No overt sign of heart failure. Use of Lasix buck-operatively depending on the volume status Lasix dose held today given CTA planned. Monitor renal function closely.  Had a long discussion with patient for insulin compliance to have better control of sugar. Agreed for Sliding scale and adjustment of insulin.  Dr. Fairchild primary cardiologist. BP in range. Patient not taking Carvedilol and Lisinopril at home. No w/u for CAD was done   Follow ECHO

## 2021-10-19 NOTE — PROGRESS NOTE ADULT - PROBLEM SELECTOR PLAN 4
A1c 7  Not taking his metformin at home   C/w SSI, adjust as needed - pt. noncompliant   Added 2units premeal

## 2021-10-19 NOTE — PROGRESS NOTE ADULT - PROBLEM SELECTOR PLAN 9
Lovenox for DVT prophylaxis  Held for CTA 10/19 - will resume afterwards Lovenox for DVT prophylaxis  Held on 10/19 - will resume after procedure

## 2021-10-19 NOTE — PROGRESS NOTE ADULT - SUBJECTIVE AND OBJECTIVE BOX
PGY-1 Progress Note discussed with attending    PAGER #: [[phone]] TILL 5:00 PM  PLEASE CONTACT ON CALL TEAM:  - On Call Team (Please refer to Quintin) FROM 5:00 PM - 8:30PM  - Nightfloat Team FROM 8:30 -7:30 AM    CHIEF COMPLAINT & BRIEF HOSPITAL COURSE:  51 year old male with PMH CVA (10 years ago, no deficits), HTN, DM, CHF (EF 30-35% 2/21), presented with right foot swelling. Patient stated two weeks ago he noticed bleeding in his shoes and found a staple stuck to the bottom of his feet. He didn't notice it before because he states he doesn't have any sensation in his feet. He went to urgent care, where they prescribed him Keflex Q8hrs. Patient states the foot started to swell more so he returned to urgent care yesterday where they gave him a prescription of clindamycin. Patient this morning started to endorse chills, nausea, but denies any fever, vomiting, chest pain, SOB, abdominal pain, or changes in bowel habits.  Patient was admitted in February due to covid, and was found to have CHF and DM. Patient has not been complaint with any of his medications and states he just takes Lasix and Xanax. MRI 10/18 shows proximal OM of 2nd and 3rd digit of R. Foot.     INTERVAL HPI/OVERNIGHT EVENTS:   No events overnight. Pt complains of right foot neuropathic pain (4/10) and reports improved sleep. No longer hearing crackles in b/l lung bases, pt denies any SOB. Pt. is still non-compliant with insulin and medications ordered for him in the hospital. Pt. is anxious and wants to leave. Scheduled for CT Angio today.    REVIEW OF SYSTEMS:  CONSTITUTIONAL: No fever, chills, weight loss, or fatigue  RESPIRATORY: No dry cough, Denies wheezing, chills or hemoptysis; No shortness of breath  CARDIOVASCULAR: No chest pain, palpitations, dizziness, or leg swelling  GASTROINTESTINAL: No abdominal pain. No vomiting, or hematemesis; No diarrhea or constipation. No melena or hematochezia.  GENITOURINARY: No dysuria or hematuria, urinary frequency  NEUROLOGICAL: +ve numbness of feet b/l No headaches, memory loss, loss of strength  SKIN: Slightly gangrenous 2nd and 3rd toe of right foot. Mild pain to palpation    Vital Signs Last 24 Hrs  T(C): 36.7 (19 Oct 2021 05:39), Max: 36.8 (18 Oct 2021 19:33)  T(F): 98 (19 Oct 2021 05:39), Max: 98.2 (18 Oct 2021 19:33)  HR: 85 (19 Oct 2021 05:39) (85 - 97)  BP: 108/70 (19 Oct 2021 05:39) (108/70 - 124/85)  BP(mean): --  RR: 17 (19 Oct 2021 05:39) (17 - 18)  SpO2: 99% (19 Oct 2021 05:39) (99% - 100%)    PHYSICAL EXAMINATION:  GENERAL: NAD, well built  HEAD:  Atraumatic, Normocephalic  EYES:  conjunctiva and sclera clear  NECK: Supple, No JVD, Normal thyroid  CHEST/LUNG: no crackles in b/l lung bases. No rales, rhonchi, wheezing, or rubs  HEART: Regular rate and rhythm; No murmurs, rubs, or gallops  ABDOMEN: Soft, Nontender, Nondistended; Bowel sounds present  NERVOUS SYSTEM:  Alert & Oriented X3,    EXTREMITIES:  2+ Peripheral Pulses, No clubbing, cyanosis, or edema  SKIN: slightly gangrenous 2nd and 3rd toe of right foot. skin partially removed, yellowish color under 3rd toe, decrease sensation, mild pain to palpation, faint pulses.                          10.5   9.09  )-----------( 380      ( 19 Oct 2021 08:22 )             31.2       137  |  101  |  14  ----------------------------<  173<H>  3.8   |  31  |  1.05      Ca    8.8      19 Oct 2021 08:22  Phos  3.4     10-18  Mg     2.1     10-18          CAPILLARY BLOOD GLUCOSE      RADIOLOGY & ADDITIONAL TESTS:    EXAM:  US PHYSIOL LWR EXT 3+ LEV BI                        PROCEDURE DATE:  10/16/2021    INTERPRETATION:  Clinical indication: Ischemic toe  COMPARISON: None  FINDINGS: There are pulsatile waveforms bilaterally. Segmental pressures could not be obtained due to calcified, noncompressible vessels. Therefore, ABIs could not be calculated.  IMPRESSION: ABIs could not be calculated due to calcified, noncompressible vessels.    EXAM:  XR FOOT 2 VIEWS RT                        PROCEDURE DATE:  10/15/2021    INTERPRETATION:  Cellulitis swollen right foot.  3 views right foot.  No fracture or dislocation. No focal bone lysis or unusual periosteal reaction. Joint spaces preserved. Small calcaneal osteophytes. Vascular calcification. Soft tissue swelling mostly over the dorsum of the foot may reflect cellulitis. No soft tissue gas.  IMPRESSION: No acute or destructive osseous pathology.  If there is clinical suspicion of osteomyelitis consider bone scan or MRI    EXAM:  MR FOOT RT                        PROCEDURE DATE:  10/18/2021    INTERPRETATION:  EXAMINATION: MR FOOT RIGHT  CLINICAL INDICATION: Evaluate for osteomyelitis  COMPARISON: Radiographs dated 10/15/2021  TECHNIQUE: Multiplanar, multi-sequence MRI of the right foot was performed without intravenous contrast.  INTERPRETATION:  Bones and soft tissues: There is no fracture. There is patchy STIR hyperintensity within the second digit proximal phalanx with mild patchy T1 hypointensity seen on the short axis TI images. Similar finding is seen at the base of the third digit proximal phalanx. The findings are favored to represent early osteomyelitis, with reactive osteitis considered less likely.  There is extensive surrounding soft tissue edema and phlegmon at the second and third digits which may represent abscess-in -formation with blistering of the overlying skin. Multiple foci of hypointensity seen on series 6 and 7 at the second and third digits may be related to vascular atherosclerosis versus foci of soft tissue gas. Advise radiographic correlation (prior radiographs are dated 10/15/2021).  Muscles: There is extensive edema within the intrinsic muscles of the foot which may be seen in neuropathy.  IMPRESSION:  1.  Findings favored to represent osteomyelitis involving second and third digit proximal phalanges.  2.  Extensive overlying soft tissue infection involving the second and third digits with surrounding edema and phlegmon, may represent abscess-in-formation. Additionally, multiple rounded foci of hypointensity in soft tissue at the second and third digits may be related to vascular atherosclerosis versus foci of soft tissue gas. Advise radiographic correlation (prior radiographs are dated 10/15/2021).     PGY-1 Progress Note discussed with attending    PAGER #: [[phone]] TILL 5:00 PM  PLEASE CONTACT ON CALL TEAM:  - On Call Team (Please refer to Quintin) FROM 5:00 PM - 8:30PM  - Nightfloat Team FROM 8:30 -7:30 AM    CHIEF COMPLAINT & BRIEF HOSPITAL COURSE:  51 year old male with PMH CVA (10 years ago, no deficits), HTN, DM, CHF (EF 30-35% 2/21), presented with right foot swelling. Patient stated two weeks ago he noticed bleeding in his shoes and found a staple stuck to the bottom of his feet. He didn't notice it before because he states he doesn't have any sensation in his feet. He went to urgent care, where they prescribed him Keflex Q8hrs. Patient states the foot started to swell more so he returned to urgent care yesterday where they gave him a prescription of clindamycin. Patient this morning started to endorse chills, nausea, but denies any fever, vomiting, chest pain, SOB, abdominal pain, or changes in bowel habits.  Patient was admitted in February due to covid, and was found to have CHF and DM. Patient has not been complaint with any of his medications and states he just takes Lasix and Xanax. MRI 10/18 shows proximal OM of 2nd and 3rd digit of R. Foot.     INTERVAL HPI/OVERNIGHT EVENTS:   No events overnight. Pt complains of right foot neuropathic pain (4/10) and reports improved sleep. No longer hearing crackles in b/l lung bases, pt denies any SOB. Pt. is still non-compliant with insulin and medications ordered for him in the hospital. Scheduled for Angiogram today.    REVIEW OF SYSTEMS:  CONSTITUTIONAL: No fever, chills, weight loss, or fatigue  RESPIRATORY: No dry cough, Denies wheezing, chills or hemoptysis; No shortness of breath  CARDIOVASCULAR: No chest pain, palpitations, dizziness, or leg swelling  GASTROINTESTINAL: No abdominal pain. No vomiting, or hematemesis; No diarrhea or constipation. No melena or hematochezia.  GENITOURINARY: No dysuria or hematuria, urinary frequency  NEUROLOGICAL: +ve numbness of feet b/l No headaches, memory loss, loss of strength  SKIN: Slightly gangrenous 2nd and 3rd toe of right foot. Mild pain to palpation    Vital Signs Last 24 Hrs  T(C): 36.7 (19 Oct 2021 05:39), Max: 36.8 (18 Oct 2021 19:33)  T(F): 98 (19 Oct 2021 05:39), Max: 98.2 (18 Oct 2021 19:33)  HR: 85 (19 Oct 2021 05:39) (85 - 97)  BP: 108/70 (19 Oct 2021 05:39) (108/70 - 124/85)  BP(mean): --  RR: 17 (19 Oct 2021 05:39) (17 - 18)  SpO2: 99% (19 Oct 2021 05:39) (99% - 100%)    PHYSICAL EXAMINATION:  GENERAL: NAD, well built  HEAD:  Atraumatic, Normocephalic  EYES:  conjunctiva and sclera clear  NECK: Supple, No JVD, Normal thyroid  CHEST/LUNG: no crackles in b/l lung bases. No rales, rhonchi, wheezing, or rubs  HEART: Regular rate and rhythm; No murmurs, rubs, or gallops  ABDOMEN: Soft, Nontender, Nondistended; Bowel sounds present  NERVOUS SYSTEM:  Alert & Oriented X3,    EXTREMITIES:  1+ pitting edema b/l 2+ Peripheral Pulses, No clubbing, cyanosis  SKIN: slightly gangrenous 2nd and 3rd toe of right foot. skin partially removed, yellowish color under 3rd toe, decrease sensation, mild pain to palpation, faint pulses.                          10.5   9.09  )-----------( 380      ( 19 Oct 2021 08:22 )             31.2       137  |  101  |  14  ----------------------------<  173<H>  3.8   |  31  |  1.05      Ca    8.8      19 Oct 2021 08:22  Phos  3.4     10-18  Mg     2.1     10-18          CAPILLARY BLOOD GLUCOSE      RADIOLOGY & ADDITIONAL TESTS:    EXAM:  US PHYSIOL LWR EXT 3+ LEV BI                        PROCEDURE DATE:  10/16/2021    INTERPRETATION:  Clinical indication: Ischemic toe  COMPARISON: None  FINDINGS: There are pulsatile waveforms bilaterally. Segmental pressures could not be obtained due to calcified, noncompressible vessels. Therefore, ABIs could not be calculated.  IMPRESSION: ABIs could not be calculated due to calcified, noncompressible vessels.    EXAM:  XR FOOT 2 VIEWS RT                        PROCEDURE DATE:  10/15/2021    INTERPRETATION:  Cellulitis swollen right foot.  3 views right foot.  No fracture or dislocation. No focal bone lysis or unusual periosteal reaction. Joint spaces preserved. Small calcaneal osteophytes. Vascular calcification. Soft tissue swelling mostly over the dorsum of the foot may reflect cellulitis. No soft tissue gas.  IMPRESSION: No acute or destructive osseous pathology.  If there is clinical suspicion of osteomyelitis consider bone scan or MRI    EXAM:  MR FOOT RT                        PROCEDURE DATE:  10/18/2021    INTERPRETATION:  EXAMINATION: MR FOOT RIGHT  CLINICAL INDICATION: Evaluate for osteomyelitis  COMPARISON: Radiographs dated 10/15/2021  TECHNIQUE: Multiplanar, multi-sequence MRI of the right foot was performed without intravenous contrast.  INTERPRETATION:  Bones and soft tissues: There is no fracture. There is patchy STIR hyperintensity within the second digit proximal phalanx with mild patchy T1 hypointensity seen on the short axis TI images. Similar finding is seen at the base of the third digit proximal phalanx. The findings are favored to represent early osteomyelitis, with reactive osteitis considered less likely.  There is extensive surrounding soft tissue edema and phlegmon at the second and third digits which may represent abscess-in -formation with blistering of the overlying skin. Multiple foci of hypointensity seen on series 6 and 7 at the second and third digits may be related to vascular atherosclerosis versus foci of soft tissue gas. Advise radiographic correlation (prior radiographs are dated 10/15/2021).  Muscles: There is extensive edema within the intrinsic muscles of the foot which may be seen in neuropathy.  IMPRESSION:  1.  Findings favored to represent osteomyelitis involving second and third digit proximal phalanges.  2.  Extensive overlying soft tissue infection involving the second and third digits with surrounding edema and phlegmon, may represent abscess-in-formation. Additionally, multiple rounded foci of hypointensity in soft tissue at the second and third digits may be related to vascular atherosclerosis versus foci of soft tissue gas. Advise radiographic correlation (prior radiographs are dated 10/15/2021).

## 2021-10-20 ENCOUNTER — RESULT REVIEW (OUTPATIENT)
Age: 51
End: 2021-10-20

## 2021-10-20 DIAGNOSIS — I50.22 CHRONIC SYSTOLIC (CONGESTIVE) HEART FAILURE: ICD-10-CM

## 2021-10-20 LAB
ANION GAP SERPL CALC-SCNC: 4 MMOL/L — LOW (ref 5–17)
BUN SERPL-MCNC: 10 MG/DL — SIGNIFICANT CHANGE UP (ref 7–18)
CALCIUM SERPL-MCNC: 9.1 MG/DL — SIGNIFICANT CHANGE UP (ref 8.4–10.5)
CHLORIDE SERPL-SCNC: 102 MMOL/L — SIGNIFICANT CHANGE UP (ref 96–108)
CO2 SERPL-SCNC: 33 MMOL/L — HIGH (ref 22–31)
CREAT SERPL-MCNC: 1.05 MG/DL — SIGNIFICANT CHANGE UP (ref 0.5–1.3)
GLUCOSE BLDC GLUCOMTR-MCNC: 132 MG/DL — HIGH (ref 70–99)
GLUCOSE BLDC GLUCOMTR-MCNC: 150 MG/DL — HIGH (ref 70–99)
GLUCOSE BLDC GLUCOMTR-MCNC: 156 MG/DL — HIGH (ref 70–99)
GLUCOSE BLDC GLUCOMTR-MCNC: 166 MG/DL — HIGH (ref 70–99)
GLUCOSE BLDC GLUCOMTR-MCNC: 179 MG/DL — HIGH (ref 70–99)
GLUCOSE BLDC GLUCOMTR-MCNC: 199 MG/DL — HIGH (ref 70–99)
GLUCOSE SERPL-MCNC: 158 MG/DL — HIGH (ref 70–99)
HCT VFR BLD CALC: 34.8 % — LOW (ref 39–50)
HGB BLD-MCNC: 11.5 G/DL — LOW (ref 13–17)
INR BLD: 1.29 RATIO — HIGH (ref 0.88–1.16)
MCHC RBC-ENTMCNC: 29.9 PG — SIGNIFICANT CHANGE UP (ref 27–34)
MCHC RBC-ENTMCNC: 33 GM/DL — SIGNIFICANT CHANGE UP (ref 32–36)
MCV RBC AUTO: 90.4 FL — SIGNIFICANT CHANGE UP (ref 80–100)
NRBC # BLD: 0 /100 WBCS — SIGNIFICANT CHANGE UP (ref 0–0)
PLATELET # BLD AUTO: 431 K/UL — HIGH (ref 150–400)
POTASSIUM SERPL-MCNC: 3.9 MMOL/L — SIGNIFICANT CHANGE UP (ref 3.5–5.3)
POTASSIUM SERPL-SCNC: 3.9 MMOL/L — SIGNIFICANT CHANGE UP (ref 3.5–5.3)
PROTHROM AB SERPL-ACNC: 15.2 SEC — HIGH (ref 10.6–13.6)
RBC # BLD: 3.85 M/UL — LOW (ref 4.2–5.8)
RBC # FLD: 12.2 % — SIGNIFICANT CHANGE UP (ref 10.3–14.5)
SODIUM SERPL-SCNC: 139 MMOL/L — SIGNIFICANT CHANGE UP (ref 135–145)
WBC # BLD: 8.16 K/UL — SIGNIFICANT CHANGE UP (ref 3.8–10.5)
WBC # FLD AUTO: 8.16 K/UL — SIGNIFICANT CHANGE UP (ref 3.8–10.5)

## 2021-10-20 PROCEDURE — 88311 DECALCIFY TISSUE: CPT | Mod: 26

## 2021-10-20 PROCEDURE — 88305 TISSUE EXAM BY PATHOLOGIST: CPT | Mod: 26

## 2021-10-20 PROCEDURE — 73630 X-RAY EXAM OF FOOT: CPT | Mod: 26,RT

## 2021-10-20 PROCEDURE — 88304 TISSUE EXAM BY PATHOLOGIST: CPT | Mod: 26

## 2021-10-20 PROCEDURE — 99233 SBSQ HOSP IP/OBS HIGH 50: CPT | Mod: GC

## 2021-10-20 RX ORDER — HYDROMORPHONE HYDROCHLORIDE 2 MG/ML
0.5 INJECTION INTRAMUSCULAR; INTRAVENOUS; SUBCUTANEOUS
Refills: 0 | Status: DISCONTINUED | OUTPATIENT
Start: 2021-10-20 | End: 2021-10-20

## 2021-10-20 RX ORDER — ATORVASTATIN CALCIUM 80 MG/1
80 TABLET, FILM COATED ORAL AT BEDTIME
Refills: 0 | Status: DISCONTINUED | OUTPATIENT
Start: 2021-10-20 | End: 2021-10-26

## 2021-10-20 RX ORDER — INSULIN LISPRO 100/ML
VIAL (ML) SUBCUTANEOUS
Refills: 0 | Status: DISCONTINUED | OUTPATIENT
Start: 2021-10-20 | End: 2021-10-26

## 2021-10-20 RX ORDER — LANOLIN ALCOHOL/MO/W.PET/CERES
3 CREAM (GRAM) TOPICAL AT BEDTIME
Refills: 0 | Status: DISCONTINUED | OUTPATIENT
Start: 2021-10-20 | End: 2021-10-26

## 2021-10-20 RX ORDER — INSULIN LISPRO 100/ML
2 VIAL (ML) SUBCUTANEOUS
Refills: 0 | Status: DISCONTINUED | OUTPATIENT
Start: 2021-10-20 | End: 2021-10-26

## 2021-10-20 RX ORDER — CEFEPIME 1 G/1
2000 INJECTION, POWDER, FOR SOLUTION INTRAMUSCULAR; INTRAVENOUS EVERY 12 HOURS
Refills: 0 | Status: DISCONTINUED | OUTPATIENT
Start: 2021-10-20 | End: 2021-10-20

## 2021-10-20 RX ORDER — ASPIRIN/CALCIUM CARB/MAGNESIUM 324 MG
81 TABLET ORAL DAILY
Refills: 0 | Status: DISCONTINUED | OUTPATIENT
Start: 2021-10-20 | End: 2021-10-26

## 2021-10-20 RX ORDER — HYDROMORPHONE HYDROCHLORIDE 2 MG/ML
1 INJECTION INTRAMUSCULAR; INTRAVENOUS; SUBCUTANEOUS
Refills: 0 | Status: DISCONTINUED | OUTPATIENT
Start: 2021-10-20 | End: 2021-10-20

## 2021-10-20 RX ORDER — CEFEPIME 1 G/1
2000 INJECTION, POWDER, FOR SOLUTION INTRAMUSCULAR; INTRAVENOUS EVERY 12 HOURS
Refills: 0 | Status: DISCONTINUED | OUTPATIENT
Start: 2021-10-20 | End: 2021-10-24

## 2021-10-20 RX ORDER — ACETAMINOPHEN 500 MG
650 TABLET ORAL ONCE
Refills: 0 | Status: COMPLETED | OUTPATIENT
Start: 2021-10-20 | End: 2021-10-20

## 2021-10-20 RX ORDER — OXYCODONE AND ACETAMINOPHEN 5; 325 MG/1; MG/1
1 TABLET ORAL ONCE
Refills: 0 | Status: DISCONTINUED | OUTPATIENT
Start: 2021-10-20 | End: 2021-10-20

## 2021-10-20 RX ORDER — CARVEDILOL PHOSPHATE 80 MG/1
3.12 CAPSULE, EXTENDED RELEASE ORAL EVERY 12 HOURS
Refills: 0 | Status: DISCONTINUED | OUTPATIENT
Start: 2021-10-20 | End: 2021-10-21

## 2021-10-20 RX ORDER — SODIUM CHLORIDE 9 MG/ML
1000 INJECTION, SOLUTION INTRAVENOUS
Refills: 0 | Status: DISCONTINUED | OUTPATIENT
Start: 2021-10-20 | End: 2021-10-20

## 2021-10-20 RX ORDER — ONDANSETRON 8 MG/1
4 TABLET, FILM COATED ORAL EVERY 6 HOURS
Refills: 0 | Status: DISCONTINUED | OUTPATIENT
Start: 2021-10-20 | End: 2021-10-26

## 2021-10-20 RX ORDER — VANCOMYCIN HCL 1 G
1000 VIAL (EA) INTRAVENOUS EVERY 12 HOURS
Refills: 0 | Status: DISCONTINUED | OUTPATIENT
Start: 2021-10-20 | End: 2021-10-26

## 2021-10-20 RX ADMIN — Medication 650 MILLIGRAM(S): at 23:04

## 2021-10-20 RX ADMIN — Medication 250 MILLIGRAM(S): at 17:53

## 2021-10-20 RX ADMIN — OXYCODONE AND ACETAMINOPHEN 1 TABLET(S): 5; 325 TABLET ORAL at 17:52

## 2021-10-20 RX ADMIN — OXYCODONE AND ACETAMINOPHEN 1 TABLET(S): 5; 325 TABLET ORAL at 18:51

## 2021-10-20 RX ADMIN — Medication 3 MILLIGRAM(S): at 23:04

## 2021-10-20 RX ADMIN — CEFEPIME 100 MILLIGRAM(S): 1 INJECTION, POWDER, FOR SOLUTION INTRAMUSCULAR; INTRAVENOUS at 17:53

## 2021-10-20 RX ADMIN — Medication 650 MILLIGRAM(S): at 23:34

## 2021-10-20 RX ADMIN — SODIUM CHLORIDE 60 MILLILITER(S): 9 INJECTION, SOLUTION INTRAVENOUS at 11:00

## 2021-10-20 NOTE — DIETITIAN INITIAL EVALUATION ADULT. - OTHER INFO
Patient from home. Visited pt. in mild pain & receiving pain meds, s/p surgery - complete amputation of second & third toe of right foot in am, followed by Podiatry, per discussion with nursing/PCA, d/milena NPO & pt. consumed ~45% of lunch tray, needs meal set up & encouragement, rt. foot & leg edema 2+, on pain management.

## 2021-10-20 NOTE — PROGRESS NOTE ADULT - SUBJECTIVE AND OBJECTIVE BOX
Podiatry Interval HPI: Pt seen resting in bed. Pt is consented for surgical management of R 2nd and 3rd digit osteomyelitis by amputating right 2nd and 3rd digits and debridement of all nonviable tissue. Pt understands all risks and procedures associated with surgery this AM. Pt has been NPO since midnight 10/20. Pt is COVID negative as of 10/19. Denies constitutional symptoms. PT received angio last night. No other pedal complaints.     Podiatry HPI: Podiatry consulted regarding a 50 yo male who presents to the ED for a right foot infection. Pt states that he stepped on a brass staple about 2 weeks ago and had no idea it punctured through the skin until the next morning when he started to feel pain. Pt states that he is diabetic so his sensation in his feet is diminished. Pt reports that he went to the urgent care and was prescribed keflex which he finished. He was also told by the urgent care to soak the foot in epsom salt and apply bacitracin cream on it. Pt reports that soaking it made it worse and turned into a infection a few days ago. Pt is complaining 6/10 pain on the right foot. Admits to having chills. Pt denies constitutional symptoms of N/V/C/F/Sob.     HPI: Patient is a 52 y/o male who presents with worsening right foot pain and wound x2 weeks for which he was prescribed keflex course which he completed and a clindamycin course which he just started. Patient has PMH significant for DM, CHF. Patient reports that a "contractor grade staple" punctured his foot and he didn't realize until the next morning when he couldn't bear weight. He reports he is up to date on his tetanus shot. He reports chills but denies fever as he "did not check it" and ascending leg pain at times. Denies shortness of breath, chest pain or pressure, nausea or vomiting.      Medications acetaminophen   Tablet .. 650 milliGRAM(s) Oral every 6 hours PRN  ALPRAZolam 0.5 milliGRAM(s) Oral two times a day PRN  aspirin enteric coated 81 milliGRAM(s) Oral daily  atorvastatin 80 milliGRAM(s) Oral at bedtime  cefepime   IVPB 2000 milliGRAM(s) IV Intermittent every 12 hours  dextrose 5%. 1000 milliLiter(s) IV Continuous <Continuous>  dextrose 50% Injectable 25 Gram(s) IV Push once  glucagon  Injectable 1 milliGRAM(s) IntraMuscular once  influenza   Vaccine 0.5 milliLiter(s) IntraMuscular once  insulin lispro (ADMELOG) corrective regimen sliding scale   SubCutaneous Before meals and at bedtime  insulin lispro Injectable (ADMELOG) 2 Unit(s) SubCutaneous three times a day before meals  melatonin 3 milliGRAM(s) Oral at bedtime  ondansetron Injectable 4 milliGRAM(s) IV Push every 6 hours PRN  vancomycin  IVPB 1500 milliGRAM(s) IV Intermittent every 12 hours    FHNo pertinent family history    ,   PMHCVA (cerebral vascular accident)    HTN (hypertension)    Anxiety    DM (diabetes mellitus)    Chronic CHF       PSHNo pertinent past surgical history        Labs                          10.5   9.09  )-----------( 380      ( 19 Oct 2021 08:22 )             31.2      10-19    137  |  101  |  14  ----------------------------<  173<H>  3.8   |  31  |  1.05    Ca    8.8      19 Oct 2021 08:22       Vital Signs Last 24 Hrs  T(C): 36.6 (20 Oct 2021 05:30), Max: 37.1 (19 Oct 2021 20:00)  T(F): 97.8 (20 Oct 2021 05:30), Max: 98.8 (19 Oct 2021 20:00)  HR: 86 (20 Oct 2021 05:30) (83 - 98)  BP: 107/72 (20 Oct 2021 05:30) (107/72 - 130/85)  BP(mean): 99 (19 Oct 2021 22:00) (85 - 99)  RR: 16 (20 Oct 2021 05:30) (12 - 20)  SpO2: 99% (20 Oct 2021 05:30) (96% - 100%)  Sedimentation Rate, Erythrocyte: 99 mm/Hr (10-15-21 @ 13:37)         C-Reactive Protein, Serum: 106 mg/L (10-16-21 @ 01:09)       PHYSICAL EXAM  GEN: CLARIBEL REICH is a pleasant well-nourished, well developed 51y Male in no acute distress, alert awake, and oriented to person, place and time.   LE Focused:    Vasc: DP/PT pulses faintly palpable, CFT < 5 seconds to digits, TG warm to cool, right 3rd toe felt cool to touch, pitting edema present on bilateral legs  Derm: Cellulitis noted on the dorsum of 3rd digit tracking proximally to midfoot, soft tissue integrity of third digit is macerated and loose, discoloration noted to the 3rd toe and 2nd toe, submet 2/3 of the right foot is macerated with fluctuance noted on the plantar aspect, puncture wound noted to plantar submet 3, no drainage noted   Neuro: Protective sensation grossly diminished  MSK: Able to wiggles toes, pain on palpation to the third digit     10/15: s/p bedside I&D: tracking noted in all directions, probes to bone, serosanguinous drainage noted       Imaging:    EXAM:  MR FOOT RT                          PROCEDURE DATE:  10/18/2021      INTERPRETATION:  EXAMINATION: MR FOOT RIGHT    CLINICAL INDICATION:Evaluate for osteomyelitis    COMPARISON: Radiographs dated 10/15/2021    TECHNIQUE: Multiplanar, multi-sequence MRI of the right foot was performed without intravenous contrast.    INTERPRETATION:    Bones and soft tissues: There is no fracture. There is patchy STIR hyperintensity within the second digit proximal phalanx with mild patchy T1 hypointensity seen on the short axis TI images. Similar finding is seen at the base of the third digit proximal phalanx. The findings are favored to represent early osteomyelitis, with reactive osteitis considered less likely.    There is extensive surrounding soft tissue edema and phlegmon at the second and third digits which may represent abscess-in -formation with blistering of the overlying skin. Multiple foci of hypointensity seen on series 6 and 7 at the second and third digits may be related to vascular atherosclerosis versus foci of soft tissue gas. Advise radiographic correlation (prior radiographs are dated 10/15/2021).    Muscles: There is extensive edema within the intrinsic muscles of the foot which may be seen in neuropathy.    IMPRESSION:  1.  Findings favored to represent osteomyelitis involving second and third digit proximal phalanges.    2.  Extensive overlying soft tissue infection involving the second and third digits with surrounding edema and phlegmon, may represent abscess-in-formation. Additionally, multiple rounded foci of hypointensity in soft tissue at the second and third digits may be related to vascular atherosclerosis versus foci of soft tissue gas. Advise radiographic correlation (prior radiographs are dated 10/15/2021).      EXAM:  XR FOOT 2 VIEWS RT                          PROCEDURE DATE:  10/15/2021        INTERPRETATION:  Cellulitis swollen right foot.    3 views right foot.    No fracture or dislocation. No focal bone lysis or unusual periosteal reaction. Joint spaces preserved. Small calcaneal osteophytes. Vascular calcification. Soft tissue swelling mostly over the dorsum of the foot may reflect cellulitis. No soft tissue gas.    IMPRESSION: No acute or destructive osseous pathology.    If there is clinical suspicion of osteomyelitis consider bone scan or MRI        EXAM:  US PHYSIOL LWR EXT 3+ LEV BI                          PROCEDURE DATE:  10/16/2021      INTERPRETATION:  Clinical indication: Ischemic toe    COMPARISON: None    FINDINGS: There are pulsatile waveforms bilaterally. Segmental pressures could not be obtained due to calcified, noncompressible vessels. Therefore, ABIs could not be calculated.    IMPRESSION: ABIs could not be calculated due to calcified, noncompressible vessels.      Culture  Specimen Source: .Surgical Swab R foot plantar wound (10.16.21 @ 02:28) Culture Results:   Few Streptococcus agalactiae (Group B) isolated   Group B streptococci are susceptible to ampicillin,   penicillin and cefazolin, but may be resistant to   erythromycin and clindamycin.   Recommendations for intrapartum prophylaxis for Group B   streptococci are penicillin or ampicillin. (10.16.21 @ 02:28)

## 2021-10-20 NOTE — DIETITIAN INITIAL EVALUATION ADULT. - NUTRITION DIAGNOSTIC #1
Pt reports history of PTSD. States, \"I was having hallucinations before vaping.\"  Asked if she has triggers to the \"hallucinations\" or flash backs she is having. She stated that she doesn't want to talk about it because it makes it worse. Pt is very anxious and panicky, asking for water and orange juice. Gave pt water and orange juice as requested.    Statement Selected

## 2021-10-20 NOTE — DIETITIAN INITIAL EVALUATION ADULT. - PROBLEM SELECTOR PLAN 1
-Pt presented after noticing increase in swelling in foot  -Received Keflex course from urgent care, followed by two days of clindamycin   -s/p I &D by podiatry, skin partially removed, yellowish color under 3rd toe, decrease sensation, mild pain to palpation, faint pulses w/ partial darkening 3rd toe   -Afebrile WBC 12.3  -S/P vanc & Zosyn in ED  -Xray: follow up official read  -Will get an MRI to rule out abscess and osteomyelitis   -Will continue with vancomycin and start cefepime as pt is diabetic   - f/u [facility]  -ID Dr. Mesa was consulted

## 2021-10-20 NOTE — DIETITIAN INITIAL EVALUATION ADULT. - PROBLEM SELECTOR PLAN 3
pt was diagnosed in 2/21 w/ HFrEF  30-35%  Not compliant with medications Coreg 3.125 BID, and Lasix 20mg, lisinopril  CXR: no signs of fluid overload   PT has bilateral pitting edema +1  Will give Lasix 40 IV stat now one dose and then start with Lasix 20mg oral tomorrow  fluid restrictions  daily weight  strict I/O

## 2021-10-20 NOTE — PROGRESS NOTE ADULT - PROBLEM SELECTOR PLAN 2
Resolved Not compliant with medications Coreg 3.125 BID, and Lasix 20mg, lisinopril  C/w Coreg 3.125 daily   Primary cardiologist Dr. Fairchild  Echo ordered 10/19  Held Lasix dose in 10/19 will resume after angiogram

## 2021-10-20 NOTE — PROGRESS NOTE ADULT - SUBJECTIVE AND OBJECTIVE BOX
PGY-1 Progress Note discussed with attending    PAGER #: [[phone]] TILL 5:00 PM  PLEASE CONTACT ON CALL TEAM:  - On Call Team (Please refer to Quintin) FROM 5:00 PM - 8:30PM  - Nightfloat Team FROM 8:30 -7:30 AM    CHIEF COMPLAINT & BRIEF HOSPITAL COURSE:  51 year old male with PMH CVA (10 years ago, no deficits), HTN, DM, CHF (EF 30-35% 2/21), presented with right foot swelling. Patient stated two weeks ago he noticed bleeding in his shoes and found a staple stuck to the bottom of his feet. He didn't notice it before because he states he doesn't have any sensation in his feet. He went to urgent care, where they prescribed him Keflex Q8hrs. Patient states the foot started to swell more so he returned to urgent care yesterday where they gave him a prescription of clindamycin. Patient this morning started to endorse chills, nausea, but denies any fever, vomiting, chest pain, SOB, abdominal pain, or changes in bowel habits.  Patient was admitted in February due to covid, and was found to have CHF and DM. Patient has not been complaint with any of his medications and states he just takes Lasix and Xanax. MRI 10/18 shows proximal OM of 2nd and 3rd digit of R. Foot.     INTERVAL HPI/OVERNIGHT EVENTS:   No events overnight. Pt complains of right foot neuropathic pain (6/10) and reports improved sleep. No longer hearing crackles in b/l lung bases, pt denies any SOB. Pt. is still non-compliant with insulin and medications ordered for him in the hospital. Scheduled for Angiogram today.    REVIEW OF SYSTEMS:  CONSTITUTIONAL: No fever, chills, weight loss, or fatigue  RESPIRATORY: No dry cough, Denies wheezing, chills or hemoptysis; No shortness of breath  CARDIOVASCULAR: No chest pain, palpitations, dizziness, or leg swelling  GASTROINTESTINAL: No abdominal pain. No vomiting, or hematemesis; No diarrhea or constipation. No melena or hematochezia.  GENITOURINARY: No dysuria or hematuria, urinary frequency  NEUROLOGICAL: +ve numbness of feet b/l No headaches, memory loss, loss of strength  SKIN: Slightly gangrenous 2nd and 3rd toe of right foot. Mild pain to palpation    Vital Signs Last 24 Hrs  T(C): 36.7 (19 Oct 2021 05:39), Max: 36.8 (18 Oct 2021 19:33)  T(F): 98 (19 Oct 2021 05:39), Max: 98.2 (18 Oct 2021 19:33)  HR: 85 (19 Oct 2021 05:39) (85 - 97)  BP: 108/70 (19 Oct 2021 05:39) (108/70 - 124/85)  BP(mean): --  RR: 17 (19 Oct 2021 05:39) (17 - 18)  SpO2: 99% (19 Oct 2021 05:39) (99% - 100%)    PHYSICAL EXAMINATION:  GENERAL: NAD, well built  HEAD:  Atraumatic, Normocephalic  EYES:  conjunctiva and sclera clear  NECK: Supple, No JVD, Normal thyroid  CHEST/LUNG: no crackles in b/l lung bases. No rales, rhonchi, wheezing, or rubs  HEART: Regular rate and rhythm; No murmurs, rubs, or gallops  ABDOMEN: Soft, Nontender, Nondistended; Bowel sounds present  NERVOUS SYSTEM:  Alert & Oriented X3,    EXTREMITIES:  1+ pitting edema b/l 2+ Peripheral Pulses, No clubbing, cyanosis  SKIN: slightly gangrenous 2nd and 3rd toe of right foot. skin partially removed, yellowish color under 3rd toe, decrease sensation, mild pain to palpation, faint pulses.                          10.5   9.09  )-----------( 380      ( 19 Oct 2021 08:22 )             31.2       137  |  101  |  14  ----------------------------<  173<H>  3.8   |  31  |  1.05      Ca    8.8      19 Oct 2021 08:22  Phos  3.4     10-18  Mg     2.1     10-18          CAPILLARY BLOOD GLUCOSE      RADIOLOGY & ADDITIONAL TESTS:    EXAM:  US PHYSIOL LWR EXT 3+ LEV BI                        PROCEDURE DATE:  10/16/2021    INTERPRETATION:  Clinical indication: Ischemic toe  COMPARISON: None  FINDINGS: There are pulsatile waveforms bilaterally. Segmental pressures could not be obtained due to calcified, noncompressible vessels. Therefore, ABIs could not be calculated.  IMPRESSION: ABIs could not be calculated due to calcified, noncompressible vessels.    EXAM:  XR FOOT 2 VIEWS RT                        PROCEDURE DATE:  10/15/2021    INTERPRETATION:  Cellulitis swollen right foot.  3 views right foot.  No fracture or dislocation. No focal bone lysis or unusual periosteal reaction. Joint spaces preserved. Small calcaneal osteophytes. Vascular calcification. Soft tissue swelling mostly over the dorsum of the foot may reflect cellulitis. No soft tissue gas.  IMPRESSION: No acute or destructive osseous pathology.  If there is clinical suspicion of osteomyelitis consider bone scan or MRI    EXAM:  MR FOOT RT                        PROCEDURE DATE:  10/18/2021    INTERPRETATION:  EXAMINATION: MR FOOT RIGHT  CLINICAL INDICATION: Evaluate for osteomyelitis  COMPARISON: Radiographs dated 10/15/2021  TECHNIQUE: Multiplanar, multi-sequence MRI of the right foot was performed without intravenous contrast.  INTERPRETATION:  Bones and soft tissues: There is no fracture. There is patchy STIR hyperintensity within the second digit proximal phalanx with mild patchy T1 hypointensity seen on the short axis TI images. Similar finding is seen at the base of the third digit proximal phalanx. The findings are favored to represent early osteomyelitis, with reactive osteitis considered less likely.  There is extensive surrounding soft tissue edema and phlegmon at the second and third digits which may represent abscess-in -formation with blistering of the overlying skin. Multiple foci of hypointensity seen on series 6 and 7 at the second and third digits may be related to vascular atherosclerosis versus foci of soft tissue gas. Advise radiographic correlation (prior radiographs are dated 10/15/2021).  Muscles: There is extensive edema within the intrinsic muscles of the foot which may be seen in neuropathy.  IMPRESSION:  1.  Findings favored to represent osteomyelitis involving second and third digit proximal phalanges.  2.  Extensive overlying soft tissue infection involving the second and third digits with surrounding edema and phlegmon, may represent abscess-in-formation. Additionally, multiple rounded foci of hypointensity in soft tissue at the second and third digits may be related to vascular atherosclerosis versus foci of soft tissue gas. Advise radiographic correlation (prior radiographs are dated 10/15/2021).     PGY-1 Progress Note discussed with attending    PAGER #: [[phone]] TILL 5:00 PM  PLEASE CONTACT ON CALL TEAM:  - On Call Team (Please refer to Quintin) FROM 5:00 PM - 8:30PM  - Nightfloat Team FROM 8:30 -7:30 AM    CHIEF COMPLAINT & BRIEF HOSPITAL COURSE:  51 year old male with PMH CVA (10 years ago, no deficits), HTN, DM, CHF (EF 30-35% 2/21), presented with right foot swelling. Patient stated two weeks ago he noticed bleeding in his shoes and found a staple stuck to the bottom of his feet. He didn't notice it before because he states he doesn't have any sensation in his feet. He went to urgent care, where they prescribed him Keflex Q8hrs. Patient states the foot started to swell more so he returned to urgent care yesterday where they gave him a prescription of clindamycin. Patient this morning started to endorse chills, nausea, but denies any fever, vomiting, chest pain, SOB, abdominal pain, or changes in bowel habits.  Patient was admitted in February due to covid, and was found to have CHF and DM. Patient has not been complaint with any of his medications and states he just takes Lasix and Xanax. MRI 10/18 shows proximal OM of 2nd and 3rd digit of R. Foot. Angiogram 10/19 shows Inline flow to R foot via dominant PT and smaller peroneal with AT occlusion.     INTERVAL HPI/OVERNIGHT EVENTS:   No events overnight. Pt complains of right foot neuropathic pain (6/10) and reports improved sleep. Pt. is still non-compliant with insulin and medications ordered for him in the hospital. Scheduled for R 2nd and 3rd digit amputation today with Dr. Kitchen.    REVIEW OF SYSTEMS:  CONSTITUTIONAL: No fever, chills, weight loss, or fatigue  RESPIRATORY: No dry cough, Denies wheezing, chills or hemoptysis; No shortness of breath  CARDIOVASCULAR: No chest pain, palpitations, dizziness, or leg swelling  GASTROINTESTINAL: No abdominal pain. No vomiting, or hematemesis; No diarrhea or constipation. No melena or hematochezia.  GENITOURINARY: No dysuria or hematuria, urinary frequency  NEUROLOGICAL: +ve numbness of feet b/l No headaches, memory loss, loss of strength  SKIN: Slightly gangrenous 2nd and 3rd toe of right foot. Mild pain to palpation    Vital Signs Last 24 Hrs  T(C): 36.6 (20 Oct 2021 05:30), Max: 37.1 (19 Oct 2021 20:00)  T(F): 97.8 (20 Oct 2021 05:30), Max: 98.8 (19 Oct 2021 20:00)  HR: 86 (20 Oct 2021 05:30) (83 - 98)  BP: 107/72 (20 Oct 2021 05:30) (107/72 - 130/85)  BP(mean): 99 (19 Oct 2021 22:00) (87 - 99)  RR: 16 (20 Oct 2021 05:30) (12 - 20)  SpO2: 99% (20 Oct 2021 05:30) (96% - 100%)    PHYSICAL EXAMINATION:  GENERAL: NAD, well built  HEAD:  Atraumatic, Normocephalic  EYES:  conjunctiva and sclera clear  NECK: Supple, No JVD, Normal thyroid  CHEST/LUNG: no crackles in b/l lung bases. No rales, rhonchi, wheezing, or rubs  HEART: Regular rate and rhythm; No murmurs, rubs, or gallops  ABDOMEN: Soft, Nontender, Nondistended; Bowel sounds present  NERVOUS SYSTEM:  Alert & Oriented X3,    EXTREMITIES:  1+ pitting edema b/l 2+ Peripheral Pulses, No clubbing, cyanosis  SKIN: slightly gangrenous 2nd and 3rd toe of right foot. skin partially removed, yellowish color under 3rd toe, decrease sensation, mild pain to palpation, faint pulses. s/p Angiogram bruising seen on anterior leg                                11.5   8.16  )-----------( 431      ( 20 Oct 2021 10:43 )             34.8       139  |  102  |  10  ----------------------------<  158<H>  3.9   |  33<H>  |  1.05    Ca    9.1      20 Oct 2021 10:43      CAPILLARY BLOOD GLUCOSE    POCT Blood Glucose.: 150 mg/dL (20 Oct 2021 11:35)  POCT Blood Glucose.: 156 mg/dL (20 Oct 2021 07:33)  POCT Blood Glucose.: 210 mg/dL (19 Oct 2021 23:40)  POCT Blood Glucose.: 153 mg/dL (19 Oct 2021 16:27)      RADIOLOGY & ADDITIONAL TESTS:    EXAM:  US PHYSIOL LWR EXT 3+ LEV BI                        PROCEDURE DATE:  10/16/2021    INTERPRETATION:  Clinical indication: Ischemic toe  COMPARISON: None  FINDINGS: There are pulsatile waveforms bilaterally. Segmental pressures could not be obtained due to calcified, noncompressible vessels. Therefore, ABIs could not be calculated.  IMPRESSION: ABIs could not be calculated due to calcified, noncompressible vessels.    ------------------------------------------------------------------------    EXAM:  XR FOOT 2 VIEWS RT                        PROCEDURE DATE:  10/15/2021    INTERPRETATION:  Cellulitis swollen right foot.  3 views right foot.  No fracture or dislocation. No focal bone lysis or unusual periosteal reaction. Joint spaces preserved. Small calcaneal osteophytes. Vascular calcification. Soft tissue swelling mostly over the dorsum of the foot may reflect cellulitis. No soft tissue gas.  IMPRESSION: No acute or destructive osseous pathology.  If there is clinical suspicion of osteomyelitis consider bone scan or MRI    ------------------------------------------------------------------------    EXAM:  MR FOOT RT                        PROCEDURE DATE:  10/18/2021    INTERPRETATION:  EXAMINATION: MR FOOT RIGHT  CLINICAL INDICATION: Evaluate for osteomyelitis  COMPARISON: Radiographs dated 10/15/2021  TECHNIQUE: Multiplanar, multi-sequence MRI of the right foot was performed without intravenous contrast.  INTERPRETATION:  Bones and soft tissues: There is no fracture. There is patchy STIR hyperintensity within the second digit proximal phalanx with mild patchy T1 hypointensity seen on the short axis TI images. Similar finding is seen at the base of the third digit proximal phalanx. The findings are favored to represent early osteomyelitis, with reactive osteitis considered less likely.  There is extensive surrounding soft tissue edema and phlegmon at the second and third digits which may represent abscess-in -formation with blistering of the overlying skin. Multiple foci of hypointensity seen on series 6 and 7 at the second and third digits may be related to vascular atherosclerosis versus foci of soft tissue gas. Advise radiographic correlation (prior radiographs are dated 10/15/2021).  Muscles: There is extensive edema within the intrinsic muscles of the foot which may be seen in neuropathy.  IMPRESSION:  1.  Findings favored to represent osteomyelitis involving second and third digit proximal phalanges.  2.  Extensive overlying soft tissue infection involving the second and third digits with surrounding edema and phlegmon, may represent abscess-in-formation. Additionally, multiple rounded foci of hypointensity in soft tissue at the second and third digits may be related to vascular atherosclerosis versus foci of soft tissue gas. Advise radiographic correlation (prior radiographs are dated 10/15/2021).    ------------------------------------------------------------------------    PROCEDURE: Transthoracic echocardiogram with 2-D, M-Mode  and complete spectral and color flow Doppler.  Study Date: 10/19/2021  INDICATION: Cardiomyopathy, unspecified (I42.9)  HISTORY:    DIMENSIONS:  Dimensions:     Normal Values:  LA:     4.9 cm    2.0 - 4.0 cm  Ao:     3.9 cm    2.0 - 3.8 cm  SEPTUM: 0.9 cm    0.6 - 1.2 cm  PWT:    1.0 cm    0.6 - 1.1 cm  LVIDd:  5.9 cm    3.0 - 5.6 cm  LVIDs:  5.9 cm    1.8 - 4.0 cm    Derived Variables:  LVMI: 101 g/m2  RWT: 0.33  Ejection Fraction Visual Estimate: 30-35 %  Ejection Fraction Stevens: 33 %    OBSERVATIONS:  Mitral Valve: Normal mitral valve. Mild mitral  regurgitation.  Aortic Root: Aortic Root: 3.9 cm.    Aortic Valve: Calcified trileaflet aortic valve with normal  opening. Mild aortic regurgitation.  Left Atrium: Normal left atrium.  LA volume index = 31  cc/m2.  Left Ventricle: Dilated lv with severe segmental left  ventricular systolic dysfunction.The distal  septum,apex,inferiorand infero-lateral walls are  hypokinetic EF=30-35%. Normal left ventricular internal  dimensions and wall thicknesses. Grade I diastolic  dysfunction (Impaired relaxation, mild).  Right Heart: Normal right atrium. Normal right ventricular  size and systolic function (TAPSE  1.7cm). There is mild  tricuspid regurgitation. Normal pulmonic valve.  Pericardium/PleuraNormal pericardium with no pericardial  effusion.  Hemodynamic: RV systolic pressure is severely increased at  65 mm Hg.    CONCLUSIONS:  1. Normal mitral valve. Mild mitral regurgitation.  2. Calcified trileaflet aortic valve with normal opening.  Mild aortic regurgitation.  3. Aortic Root: 3.9 cm.    4. Normal left atrium.  LA volume index = 31 cc/m2.  5. Normal left ventricular internal dimensions and wall  thicknesses.  6. Dilated lv with severe segmental left ventricular  systolic dysfunction.The distal septum,apex,inferiorand  infero-lateral walls are hypokinetic EF=30-35%.  7. Grade I diastolic dysfunction (Impaired relaxation,  mild).  8. Normal right atrium.  9. Normal right ventricular size and systolic function  (TAPSE  1.7cm).  10. RV systolic pressure is severely increased at  65 mm  Hg.  11. There is mild tricuspid regurgitation.  12. Normal pulmonic valve.  13. Normal pericardium with no pericardial effusion.    ------------------------------------------------------------------------    EXAM: R LE ANGIOGRAM (UNOFFICIAL READ)  Inline flow to R foot via dominant PT and smaller peroneal with AT occlusion

## 2021-10-20 NOTE — CHART NOTE - NSCHARTNOTEFT_GEN_A_CORE
Pt POD 0 s/p RLE angio  Pt awake, alert, NAD  Angio:   Inline flow to R foot via dom PT and smaller peroneal.  AT occ.  Vital Signs Last 24 Hrs  T(C): 36.7 (19 Oct 2021 22:49), Max: 37.1 (19 Oct 2021 20:00)  T(F): 98 (19 Oct 2021 22:49), Max: 98.8 (19 Oct 2021 20:00)  HR: 93 (19 Oct 2021 22:49) (83 - 98)  BP: 119/72 (19 Oct 2021 22:49) (108/70 - 130/85)  BP(mean): 99 (19 Oct 2021 22:00) (85 - 99)  RR: 18 (19 Oct 2021 22:49) (12 - 20)  SpO2: 100% (19 Oct 2021 22:49) (96% - 100%)    R groin soft, dressing CDI, no hematoma    stable post-op

## 2021-10-20 NOTE — DIETITIAN INITIAL EVALUATION ADULT. - FACTORS AFF FOOD INTAKE
difficulty with food procurement/preparation/pain/Hinduism/ethnic/cultural/personal food preferences/other (specify) cellulitis of right lower limb, diabetes, CHF, anxiety, PVD, CVA/difficulty with food procurement/preparation/pain/Congregation/ethnic/cultural/personal food preferences/other (specify)

## 2021-10-20 NOTE — DIETITIAN INITIAL EVALUATION ADULT. - PROBLEM SELECTOR PLAN 4
- not compliant with metformin 500mg at home  - will get A1c  -  - diabetic diet  - will start sliding scale  - monitor BS and adjust insulin as needed

## 2021-10-20 NOTE — DIETITIAN INITIAL EVALUATION ADULT. - PROBLEM SELECTOR PLAN 2
pt has a hx of HTN, non compliant with lisinopril 2.5mg at home  -127/78  Continue to monitor for now  hold lisinopril for now

## 2021-10-20 NOTE — DIETITIAN INITIAL EVALUATION ADULT. - PROBLEM SELECTOR PLAN 6
Hb 10.4, baseline is 14  MCV 89.5  No signs of bleeding, denies any melena or blood in stools  likely normocytic vs macrocytic anemia  Can follow outpatient for anemia work up  monitor CBC daily

## 2021-10-20 NOTE — PROGRESS NOTE ADULT - ATTENDING COMMENTS
for operating room for amputation non viable digits   consented and surgery and risks reviewed   answered all quations

## 2021-10-20 NOTE — PROGRESS NOTE ADULT - ATTENDING COMMENTS
Patient seen and examined this morning with Housestapatricia.     51 year old male with PMH CVA (10 years ago, no deficits), HTN, DM, CHF (EF 30-35% 2/21), presented with right foot swelling. Patient stated two weeks ago he noticed bleeding in his shoes and found a staple stuck to the bottom of his feet. He didn't notice it before because he states he doesn't have any sensation in his feet. He went to urgent care, where they prescribed him Keflex and due to progressive worsening leg swelling send from Urgent Care to ED. Patient admitted with Cellulitis and Abscess        Vital Signs Last 24 Hrs  T(C): 36.8 (19 Oct 2021 17:27), Max: 36.8 (18 Oct 2021 19:33)  T(F): 98.3 (19 Oct 2021 17:27), Max: 98.3 (19 Oct 2021 17:27)  HR: 98 (19 Oct 2021 17:27) (85 - 98)  BP: 129/87 (19 Oct 2021 17:27) (108/70 - 129/87)  BP(mean): 85 (19 Oct 2021 13:20) (85 - 85)  RR: 16 (19 Oct 2021 17:27) (16 - 18)  SpO2: 100% (19 Oct 2021 17:27) (99% - 100%)    Labs:                        11.5   8.16  )-----------( 431      ( 20 Oct 2021 10:43 )             34.8   10-20    139  |  102  |  10  ----------------------------<  158<H>  3.9   |  33<H>  |  1.05    Ca    9.1      20 Oct 2021 10:43    MR Foot No Cont, Right (10.18.21 @ 12:44)    IMPRESSION:  1.  Findings favored to represent osteomyelitis involving second and third digit proximal phalanges.  2.  Extensive overlying soft tissue infection involving the second and third digits with surrounding edema and phlegmon, may represent abscess-in-formation. Additionally, multiple rounded foci of hypointensity in soft tissue at the second and third digits may be related to vascular atherosclerosis versus foci of soft tissue gas. Advise radiographic correlation (prior radiographs are dated 10/15/2021).    Transthoracic Echocardiogram (10.19.21 @ 09:58)     CONCLUSIONS:  1. Normal mitral valve. Mild mitral regurgitation.  2. Calcified trileaflet aortic valve with normal opening. Mild aortic regurgitation.  3. Aortic Root: 3.9 cm.  4. Normal left atrium.  LA volume index = 31 cc/m2.  5. Normal left ventricular internal dimensions and wall thicknesses.  6. Dilated lv with severe segmental left ventricular systolic dysfunction. The distal septum, apex, inferior and inferolateral walls are hypokinetic EF=30-35%.  7. Grade I diastolic dysfunction (Impaired relaxation, mild).  8. Normal right atrium.  9. Normal right ventricular size and systolic function (TAPSE  1.7cm).  10. RV systolic pressure is severely increased at  65 mm Hg.  11. There is mild tricuspid regurgitation.  12. Normal pulmonic valve.  13. Normal pericardium with no pericardial effusion.    A/P:  Cellulitis of the Right foot with suspected abscess/ Osteomyelitis second and third digit   PVD   Chronic systolic CHF  CEDRIC improved   HTN   DM with likely Neuropathy  Non compliance with medication regimen  ?? Depressed mood    Plan:  Patient planned for OR this afternoon for possible Amputation/ I & D  Podiatry planning for amputation of right 2nd toe possible 3 rd toe amputation.   MRI noted showing OM, Cont Vancomycin and Cefepime, follow vanc trough, ID on board   MIGDALIA/PVR showed non compressible vessels. Vascular surgery on board. CT Angio with mild occlusion without intervention; may benefit form antiplatelet therapy once podiatrically stable    Patient is low risk for low risk procedure including toe amputation surgery. No overt sign of heart failure. Use of Lasix buck-operatively depending on the volume status. Monitor renal function closely.    Lengthy d/w Patient counseled on  importance of better medication compliance. As per patient stated most medications does not go well with him. I had suggested that he likely has underlying neuropathy contributing to him not recognizing that he stepped on jean carlos.   Dr. Fairchild primary cardiologist. Patient not taking Carvedilol and Lisinopril prescribed for HF. No w/u for CAD was done   He was strongly encouraged to at least take medications for his Heart failure which can improve his cardiac function.     Discussed with Housestaff at length findings and plan of care Patient seen and examined this morning with Housestaff.     51 year old male with PMH CVA (10 years ago, no deficits), HTN, DM, CHF (EF 30-35% 2/21), presented with right foot swelling. Patient stated two weeks ago he noticed bleeding in his shoes and found a staple stuck to the bottom of his feet. He didn't notice it before because he states he doesn't have any sensation in his feet. He went to urgent care, where they prescribed him Keflex and due to progressive worsening leg swelling send from Urgent Care to ED. Patient admitted with Cellulitis and Abscess      Patient doing okay without new complaints. Pain controlled. awaiting OR for amputation of toe.   denies fever, chills, SOB, palpitations, chest pain, nausea, vomiting, diarrhea, constipation, dizziness  Admit to being non compliant with medications due to side effects    Vital Signs Last 24 Hrs: Reviewed as above    P/E: As above  middle agedmale, OOB to chair, NAD  Deprressed mood  Neuro: AAO x3; No  gross focal deficits  CVS: S1S2 present, regular  Resp: BLAE+, No wheeze or Rhonchi  GI: Soft, BS+, NT  Extr: No edema or calf tenderness Left leg  Right foot dressing intact    Labs: Reviewed                      11.5   8.16  )-----------( 431      ( 20 Oct 2021 10:43 )             34.8   10-20    139  |  102  |  10  ----------------------------<  158<H>  3.9   |  33<H>  |  1.05  Ca    9.1      20 Oct 2021 10:43    Vancomycin Level, Trough (10.19.21 @ 06:22) Vancomycin Level, Trough: 17.8:     MR Foot No Cont, Right (10.18.21 @ 12:44)  IMPRESSION:  1.  Findings favored to represent osteomyelitis involving second and third digit proximal phalanges.  2.  Extensive overlying soft tissue infection involving the second and third digits with surrounding edema and phlegmon, may represent abscess-in-formation. Additionally, multiple rounded foci of hypointensity in soft tissue at the second and third digits may be related to vascular atherosclerosis versus foci of soft tissue gas. Advise radiographic correlation (prior radiographs are dated 10/15/2021).    Transthoracic Echocardiogram (10.19.21 @ 09:58)   CONCLUSIONS:  1. Normal mitral valve. Mild mitral regurgitation.  2. Calcified trileaflet aortic valve with normal opening. Mild aortic regurgitation.  3. Aortic Root: 3.9 cm.  4. Normal left atrium.  LA volume index = 31 cc/m2.  5. Normal left ventricular internal dimensions and wall thicknesses.  6. Dilated lv with severe segmental left ventricular systolic dysfunction. The distal septum, apex, inferior and inferolateral walls are hypokinetic EF=30-35%.  7. Grade I diastolic dysfunction (Impaired relaxation, mild).  8. Normal right atrium.  9. Normal right ventricular size and systolic function (TAPSE  1.7cm).  10. RV systolic pressure is severely increased at  65 mm Hg.  11. There is mild tricuspid regurgitation.  12. Normal pulmonic valve.  13. Normal pericardium with no pericardial effusion.    A/P:  Cellulitis of the Right foot with suspected abscess/ Osteomyelitis second and third digit   PVD   Chronic systolic CHF  CEDRIC improved   HTN   DM with likely Neuropathy  Non compliance with medication regimen  ?? Depressed mood    Plan:  Patient planned for OR this afternoon for possible Amputation/ I & D  Podiatry planning for amputation of right 2nd toe possible 3 rd toe amputation.   MRI showing OM, Cont Vancomycin and Cefepime, follow vanc trough, ID follow up for duration of antibiotics  MIGDALIA/PVR showed non compressible vessels. Vascular surgery on board. CT Angio with mild occlusion without intervention; may benefit form antiplatelet therapy once podiatrically stable    Patient is low risk for low risk procedure including toe amputation surgery. No overt sign of heart failure. Use of Lasix buck-operatively depending on the volume status. Monitor renal function closely.    Lengthy d/w Patient counseled on  importance of better medication compliance. As per patient stated most medications does not go well with him. I had suggested that he likely has underlying neuropathy contributing to him not recognizing that he stepped on jean carlos.   Dr. Fairchild primary cardiologist. Patient not taking Carvedilol and Lisinopril prescribed for HF. No w/u for CAD was done   He was strongly encouraged to at least take medications for his Heart failure which can improve his cardiac function.   Diet and Exercise counseled to improve BP and sugar contorl    Discussed with Housestaff at length findings and plan of care  Discussed with Patient

## 2021-10-20 NOTE — PROGRESS NOTE ADULT - PROBLEM SELECTOR PLAN 1
Cont Vanc and Cefepime  Vanc Trough 10/19 17.8  F/u Vanc Trough 10/21  Dr Mesa - ID consulted  Cx: Few Streptococcus agalactiae (Group B) isolated sensitive to Ampicillin and Penicillin  MRI:  Findings favored to represent osteomyelitis involving second and third digit proximal phalanges.  Extensive overlying soft tissue infection involving the second and third digits with surrounding edema and phlegmon, may represent abscess-in-formation. Additionally, multiple rounded foci of hypointensity in soft tissue at the second and third digits may be related to vascular atherosclerosis versus foci of soft tissue gas.  MIGDALIA/PVR shows noncompressible vessels   Podiatry consulted.  Vascular consulted - pt. scheduled for OR for Angiogram Cont Vanc and Cefepime  Vanc Trough 10/19 17.8  F/u Vanc Trough 10/21  Dr Mesa - ID consulted  Cx: Few Streptococcus agalactiae (Group B) isolated sensitive to Ampicillin and Penicillin  MRI:  Findings favored to represent osteomyelitis involving second and third digit proximal phalanges.  Extensive overlying soft tissue infection involving the second and third digits with surrounding edema and phlegmon, may represent abscess-in-formation. Additionally, multiple rounded foci of hypointensity in soft tissue at the second and third digits may be related to vascular atherosclerosis versus foci of soft tissue gas.  MIGDALIA/PVR shows noncompressible vessels   Vascular consulted - Angiogram shows Inline flow to R foot via dominant PT and smaller peroneal with AT occlusion  Podiatry consulted - recommends amputation scheduled 10/20 Cont Vancomycin and Cefepime  Vanc Trough 10/19 17.8  F/u Vanc Trough 10/21  Dr Mesa - ID consulted  Cx: Few Streptococcus agalactiae (Group B) isolated sensitive to Ampicillin and Penicillin  MRI:  Findings favored to represent osteomyelitis involving second and third digit proximal phalanges.  Extensive overlying soft tissue infection involving the second and third digits with surrounding edema and phlegmon, may represent abscess-in-formation. Additionally, multiple rounded foci of hypointensity in soft tissue at the second and third digits may be related to vascular atherosclerosis versus foci of soft tissue gas.  MIGDALIA/PVR shows noncompressible vessels   Vascular consulted - Angiogram shows Inline flow to R foot via dominant PT and smaller peroneal with AT occlusion  Podiatry consulted - recommends amputation scheduled 10/20

## 2021-10-20 NOTE — PROGRESS NOTE ADULT - ASSESSMENT
s/p RLE angiogram POD#1  Inline flow to R foot via dominant PT and smaller peroneal with AT occlusion    Pt optimized from vasc standpoint  Continue care as per podiatry recs  Recommend medical management of atherosclerosis   Recommend outpatient vascular surveillance with Dr. Castaneda in the office  Please reconsult as needed

## 2021-10-20 NOTE — DIETITIAN INITIAL EVALUATION ADULT. - PERTINENT MEDS FT
MEDICATIONS  (STANDING):  influenza   Vaccine 0.5 milliLiter(s) IntraMuscular once    MEDICATIONS  (PRN):

## 2021-10-20 NOTE — DIETITIAN INITIAL EVALUATION ADULT. - PERTINENT LABORATORY DATA
10-20 Na139 mmol/L Glu 158 mg/dL<H> K+ 3.9 mmol/L Cr  1.05 mg/dL BUN 10 mg/dL   10-18 Phos 3.4 mg/dL   10-15 Alb 2.7 g/dL<L>        10-16-21 @ 10:44 HgbA1C 7.0

## 2021-10-20 NOTE — PROGRESS NOTE ADULT - PROBLEM SELECTOR PLAN 4
A1c 7  Not taking his metformin at home   C/w SSI, adjust as needed - pt. noncompliant   Added 2units premeal Patient noncompliant w/ meds   On Lisinopril 2.5mg at home

## 2021-10-20 NOTE — PROGRESS NOTE ADULT - SUBJECTIVE AND OBJECTIVE BOX
INTERVAL HPI/OVERNIGHT EVENTS:  No acute events overnight. No acute complaints.     MEDICATIONS  (STANDING):  aspirin enteric coated 81 milliGRAM(s) Oral daily  atorvastatin 80 milliGRAM(s) Oral at bedtime  dextrose 5%. 1000 milliLiter(s) (100 mL/Hr) IV Continuous <Continuous>  dextrose 50% Injectable 25 Gram(s) IV Push once  glucagon  Injectable 1 milliGRAM(s) IntraMuscular once  influenza   Vaccine 0.5 milliLiter(s) IntraMuscular once  insulin lispro (ADMELOG) corrective regimen sliding scale   SubCutaneous Before meals and at bedtime  insulin lispro Injectable (ADMELOG) 2 Unit(s) SubCutaneous three times a day before meals  melatonin 3 milliGRAM(s) Oral at bedtime  vancomycin  IVPB 1500 milliGRAM(s) IV Intermittent every 12 hours    MEDICATIONS  (PRN):  acetaminophen   Tablet .. 650 milliGRAM(s) Oral every 6 hours PRN Temp greater or equal to 38C (100.4F), Moderate Pain (4 - 6)  ALPRAZolam 0.5 milliGRAM(s) Oral two times a day PRN anxiety  ondansetron Injectable 4 milliGRAM(s) IV Push every 6 hours PRN Nausea and/or Vomiting      Vital Signs Last 24 Hrs  T(C): 36.6 (20 Oct 2021 05:30), Max: 37.1 (19 Oct 2021 20:00)  T(F): 97.8 (20 Oct 2021 05:30), Max: 98.8 (19 Oct 2021 20:00)  HR: 86 (20 Oct 2021 05:30) (83 - 98)  BP: 107/72 (20 Oct 2021 05:30) (107/72 - 130/85)  BP(mean): 99 (19 Oct 2021 22:00) (85 - 99)  RR: 16 (20 Oct 2021 05:30) (12 - 20)  SpO2: 99% (20 Oct 2021 05:30) (96% - 100%)    Physical:  General: Alert and oriented, not in acute distress  Resp: Breathing unlabored  Groin: right groin dressing c/d/i; no palpable hematoma, no skin changes; distal extremity warm  Extremities: RLE in ACE wrap and protective boot    I&O's Detail    19 Oct 2021 07:01  -  20 Oct 2021 07:00  --------------------------------------------------------  IN:    Lactated Ringers Bolus: 300 mL  Total IN: 300 mL    OUT:  Total OUT: 0 mL  Total NET: 300 mL      LABS:                      10.5   9.09  )-----------( 380      ( 19 Oct 2021 08:22 )             31.2             10-19    137  |  101  |  14  ----------------------------<  173<H>  3.8   |  31  |  1.05    Ca    8.8      19 Oct 2021 08:22

## 2021-10-20 NOTE — PROGRESS NOTE ADULT - PROBLEM SELECTOR PLAN 5
Not compliant with medications Coreg 3.125 BID, and Lasix 20mg, lisinopril  C/w Coreg 3.125 daily   Will change to 40mg 10/18  Primary cardiologist Dr. Fairchild  Echo ordered 10/19  Held Lasix dose in 10/19 will resume after angiogram A1c 7  Not taking his metformin at home   C/w SSI, adjust as needed - pt. noncompliant   Added 2units premeal

## 2021-10-20 NOTE — PROGRESS NOTE ADULT - ASSESSMENT
A:  Right foot infection  Cellulitis    P:  Pt seen and evaluated  MRI of right foot reviewed - shows OM of the 2nd and 3rd proximal phalanx with abscess in formation   Culture report reviewed - Strep B   Evaluated wound R foot - 3rd toe more discolored, dusky, 2nd toe looks better   Applied betadine, DSD   Pt to keep dressing clean, dry and intact  Pt to heel wb in surgical shoe to R foot  Scheduled for R 2nd and 3rd digit amputation Wednesday (10/19) at 12 PM with Dr. Kitchen   Appreciate medical optimization   Covid Negative - 10/19  NPO after midnight 10/20  Will follow while in house  Discussed and seen bedside with attending Dr. Kitchen    A:  Right foot infection  Cellulitis    P:  Pt seen and evaluated  MRI of right foot reviewed - shows OM of the 2nd and 3rd proximal phalanx with abscess in formation   Culture report reviewed - Strep B   Left dressing to R foot intact  Pt to keep dressing clean, dry and intact  Pt to heel wb in surgical shoe to R foot  Scheduled for R 2nd and 3rd digit amputation Wednesday (10/19) at 12 PM with Dr. Kitchen   Appreciate medical optimization   Covid Negative - 10/19  NPO after midnight 10/20  Will follow while in house  Discussed and seen bedside with attending Dr. Kitchen

## 2021-10-21 LAB
ALBUMIN SERPL ELPH-MCNC: 2.4 G/DL — LOW (ref 3.5–5)
ALP SERPL-CCNC: 66 U/L — SIGNIFICANT CHANGE UP (ref 40–120)
ALT FLD-CCNC: 22 U/L DA — SIGNIFICANT CHANGE UP (ref 10–60)
ANION GAP SERPL CALC-SCNC: 5 MMOL/L — SIGNIFICANT CHANGE UP (ref 5–17)
AST SERPL-CCNC: 10 U/L — SIGNIFICANT CHANGE UP (ref 10–40)
BILIRUB SERPL-MCNC: 0.4 MG/DL — SIGNIFICANT CHANGE UP (ref 0.2–1.2)
BUN SERPL-MCNC: 14 MG/DL — SIGNIFICANT CHANGE UP (ref 7–18)
CALCIUM SERPL-MCNC: 8.6 MG/DL — SIGNIFICANT CHANGE UP (ref 8.4–10.5)
CHLORIDE SERPL-SCNC: 102 MMOL/L — SIGNIFICANT CHANGE UP (ref 96–108)
CO2 SERPL-SCNC: 31 MMOL/L — SIGNIFICANT CHANGE UP (ref 22–31)
CREAT SERPL-MCNC: 1.16 MG/DL — SIGNIFICANT CHANGE UP (ref 0.5–1.3)
CRP SERPL-MCNC: 42 MG/L — HIGH
ERYTHROCYTE [SEDIMENTATION RATE] IN BLOOD: 98 MM/HR — HIGH (ref 0–20)
GLUCOSE BLDC GLUCOMTR-MCNC: 182 MG/DL — HIGH (ref 70–99)
GLUCOSE BLDC GLUCOMTR-MCNC: 189 MG/DL — HIGH (ref 70–99)
GLUCOSE BLDC GLUCOMTR-MCNC: 192 MG/DL — HIGH (ref 70–99)
GLUCOSE BLDC GLUCOMTR-MCNC: 210 MG/DL — HIGH (ref 70–99)
GLUCOSE SERPL-MCNC: 140 MG/DL — HIGH (ref 70–99)
GRAM STN FLD: SIGNIFICANT CHANGE UP
GRAM STN FLD: SIGNIFICANT CHANGE UP
HCT VFR BLD CALC: 30.6 % — LOW (ref 39–50)
HGB BLD-MCNC: 10 G/DL — LOW (ref 13–17)
MCHC RBC-ENTMCNC: 29.7 PG — SIGNIFICANT CHANGE UP (ref 27–34)
MCHC RBC-ENTMCNC: 32.7 GM/DL — SIGNIFICANT CHANGE UP (ref 32–36)
MCV RBC AUTO: 90.8 FL — SIGNIFICANT CHANGE UP (ref 80–100)
NRBC # BLD: 0 /100 WBCS — SIGNIFICANT CHANGE UP (ref 0–0)
PLATELET # BLD AUTO: 375 K/UL — SIGNIFICANT CHANGE UP (ref 150–400)
POTASSIUM SERPL-MCNC: 4.2 MMOL/L — SIGNIFICANT CHANGE UP (ref 3.5–5.3)
POTASSIUM SERPL-SCNC: 4.2 MMOL/L — SIGNIFICANT CHANGE UP (ref 3.5–5.3)
PROT SERPL-MCNC: 6.9 G/DL — SIGNIFICANT CHANGE UP (ref 6–8.3)
RBC # BLD: 3.37 M/UL — LOW (ref 4.2–5.8)
RBC # FLD: 12.4 % — SIGNIFICANT CHANGE UP (ref 10.3–14.5)
SODIUM SERPL-SCNC: 138 MMOL/L — SIGNIFICANT CHANGE UP (ref 135–145)
SPECIMEN SOURCE: SIGNIFICANT CHANGE UP
SPECIMEN SOURCE: SIGNIFICANT CHANGE UP
VANCOMYCIN TROUGH SERPL-MCNC: 18.3 UG/ML — SIGNIFICANT CHANGE UP (ref 10–20)
WBC # BLD: 10.61 K/UL — HIGH (ref 3.8–10.5)
WBC # FLD AUTO: 10.61 K/UL — HIGH (ref 3.8–10.5)

## 2021-10-21 PROCEDURE — 99233 SBSQ HOSP IP/OBS HIGH 50: CPT | Mod: GC

## 2021-10-21 RX ORDER — SACUBITRIL AND VALSARTAN 24; 26 MG/1; MG/1
1 TABLET, FILM COATED ORAL
Refills: 0 | Status: DISCONTINUED | OUTPATIENT
Start: 2021-10-21 | End: 2021-10-26

## 2021-10-21 RX ORDER — OXYCODONE AND ACETAMINOPHEN 5; 325 MG/1; MG/1
1 TABLET ORAL EVERY 6 HOURS
Refills: 0 | Status: DISCONTINUED | OUTPATIENT
Start: 2021-10-21 | End: 2021-10-22

## 2021-10-21 RX ORDER — CHLORHEXIDINE GLUCONATE 213 G/1000ML
1 SOLUTION TOPICAL
Refills: 0 | Status: DISCONTINUED | OUTPATIENT
Start: 2021-10-21 | End: 2021-10-26

## 2021-10-21 RX ORDER — ACETAMINOPHEN 500 MG
1000 TABLET ORAL ONCE
Refills: 0 | Status: COMPLETED | OUTPATIENT
Start: 2021-10-21 | End: 2021-10-21

## 2021-10-21 RX ADMIN — OXYCODONE AND ACETAMINOPHEN 1 TABLET(S): 5; 325 TABLET ORAL at 18:00

## 2021-10-21 RX ADMIN — Medication 400 MILLIGRAM(S): at 10:29

## 2021-10-21 RX ADMIN — OXYCODONE AND ACETAMINOPHEN 1 TABLET(S): 5; 325 TABLET ORAL at 17:21

## 2021-10-21 RX ADMIN — Medication 1000 MILLIGRAM(S): at 11:00

## 2021-10-21 RX ADMIN — CEFEPIME 100 MILLIGRAM(S): 1 INJECTION, POWDER, FOR SOLUTION INTRAMUSCULAR; INTRAVENOUS at 05:22

## 2021-10-21 RX ADMIN — Medication 250 MILLIGRAM(S): at 17:22

## 2021-10-21 RX ADMIN — CEFEPIME 100 MILLIGRAM(S): 1 INJECTION, POWDER, FOR SOLUTION INTRAMUSCULAR; INTRAVENOUS at 17:22

## 2021-10-21 RX ADMIN — Medication 250 MILLIGRAM(S): at 05:39

## 2021-10-21 NOTE — PROGRESS NOTE ADULT - SUBJECTIVE AND OBJECTIVE BOX
51y Male    Meds:  cefepime   IVPB 2000 milliGRAM(s) IV Intermittent every 12 hours  vancomycin  IVPB 1000 milliGRAM(s) IV Intermittent every 12 hours    Allergies    Nuts (Unknown)  tetracycline (Hives)    Intolerances        VITALS:  Vital Signs Last 24 Hrs  T(C): 36.3 (21 Oct 2021 13:50), Max: 37.1 (20 Oct 2021 19:49)  T(F): 97.3 (21 Oct 2021 13:50), Max: 98.8 (20 Oct 2021 19:49)  HR: 81 (21 Oct 2021 13:50) (81 - 91)  BP: 114/73 (21 Oct 2021 13:50) (102/64 - 114/73)  BP(mean): 82 (21 Oct 2021 13:50) (82 - 82)  RR: 16 (21 Oct 2021 13:50) (16 - 18)  SpO2: 100% (21 Oct 2021 13:50) (98% - 100%)    LABS/DIAGNOSTIC TESTS:                          10.0   10.61 )-----------( 375      ( 21 Oct 2021 04:40 )             30.6         10-21    138  |  102  |  14  ----------------------------<  140<H>  4.2   |  31  |  1.16    Ca    8.6      21 Oct 2021 04:40    TPro  6.9  /  Alb  2.4<L>  /  TBili  0.4  /  DBili  x   /  AST  10  /  ALT  22  /  AlkPhos  66  10-21      LIVER FUNCTIONS - ( 21 Oct 2021 04:40 )  Alb: 2.4 g/dL / Pro: 6.9 g/dL / ALK PHOS: 66 U/L / ALT: 22 U/L DA / AST: 10 U/L / GGT: x             CULTURES: .Tissue right 3rd metatarsal  10-20 @ 21:21 --  --    No polymorphonuclear cells seen per low power field  No organisms seen per oil power field      .Tissue right 2nd metatarsal  10-20 @ 21:20 --  --    No polymorphonuclear cells seen per low power field  No organisms seen per oil power field      .Surgical Swab R foot plantar wound  10-16 @ 02:28   Numerous Finegoldia magna "Susceptibilities not performed"  Few Staphylococcus aureus  Few Streptococcus agalactiae (Group B) isolated  Group B streptococci are susceptible to ampicillin,  penicillin and cefazolin, but may be resistant to  erythromycin and clindamycin.  Recommendations for intrapartum prophylaxis for Group B  streptococci are penicillin or ampicillin.  --  Staphylococcus aureus            RADIOLOGY:      ROS:  [  ] UNABLE TO ELICIT 51y Male who is s/p amputation of his right 2nd and 3rd toes and has open wound and so connected to a wound vac. He has no fevers , chills, diarrhea or other complaints.    Meds:  cefepime   IVPB 2000 milliGRAM(s) IV Intermittent every 12 hours  vancomycin  IVPB 1000 milliGRAM(s) IV Intermittent every 12 hours    Allergies    Nuts (Unknown)  tetracycline (Hives)    Intolerances        VITALS:  Vital Signs Last 24 Hrs  T(C): 36.3 (21 Oct 2021 13:50), Max: 37.1 (20 Oct 2021 19:49)  T(F): 97.3 (21 Oct 2021 13:50), Max: 98.8 (20 Oct 2021 19:49)  HR: 81 (21 Oct 2021 13:50) (81 - 91)  BP: 114/73 (21 Oct 2021 13:50) (102/64 - 114/73)  BP(mean): 82 (21 Oct 2021 13:50) (82 - 82)  RR: 16 (21 Oct 2021 13:50) (16 - 18)  SpO2: 100% (21 Oct 2021 13:50) (98% - 100%)    LABS/DIAGNOSTIC TESTS:                          10.0   10.61 )-----------( 375      ( 21 Oct 2021 04:40 )             30.6     Vancomycin Level, Trough (10.21.21 @ 04:40)   Vancomycin Level, Trough: 18.3:   10-21    138  |  102  |  14  ----------------------------<  140<H>  4.2   |  31  |  1.16    Ca    8.6      21 Oct 2021 04:40    TPro  6.9  /  Alb  2.4<L>  /  TBili  0.4  /  DBili  x   /  AST  10  /  ALT  22  /  AlkPhos  66  10-21      LIVER FUNCTIONS - ( 21 Oct 2021 04:40 )  Alb: 2.4 g/dL / Pro: 6.9 g/dL / ALK PHOS: 66 U/L / ALT: 22 U/L DA / AST: 10 U/L / GGT: x             CULTURES: .Tissue right 3rd metatarsal  10-20 @ 21:21 --  --    No polymorphonuclear cells seen per low power field  No organisms seen per oil power field      .Tissue right 2nd metatarsal  10-20 @ 21:20 --  --    No polymorphonuclear cells seen per low power field  No organisms seen per oil power field      .Surgical Swab R foot plantar wound  10-16 @ 02:28   Numerous Finegoldia magna "Susceptibilities not performed"  Few Staphylococcus aureus  Few Streptococcus agalactiae (Group B) isolated  Group B streptococci are susceptible to ampicillin,  penicillin and cefazolin, but may be resistant to  erythromycin and clindamycin.  Recommendations for intrapartum prophylaxis for Group B  streptococci are penicillin or ampicillin.  --  Staphylococcus aureus            RADIOLOGY:      ROS:  [  ] UNABLE TO ELICIT

## 2021-10-21 NOTE — PROGRESS NOTE ADULT - SUBJECTIVE AND OBJECTIVE BOX
Podiatry Interval HPI: Pt seen resting in bed s/p 2nd and 3rd toe amputation Pt states that there is still mild pain and pressure in the surgical site. Pt denies any acute overnight events. Pt denies any constitutional symptoms of N/V/C/F/Sob.     Podiatry HPI: Podiatry consulted regarding a 50 yo male who presents to the ED for a right foot infection. Pt states that he stepped on a brass staple about 2 weeks ago and had no idea it punctured through the skin until the next morning when he started to feel pain. Pt states that he is diabetic so his sensation in his feet is diminished. Pt reports that he went to the urgent care and was prescribed keflex which he finished. He was also told by the urgent care to soak the foot in epsom salt and apply bacitracin cream on it. Pt reports that soaking it made it worse and turned into a infection a few days ago. Pt is complaining 6/10 pain on the right foot. Admits to having chills. Pt denies constitutional symptoms of N/V/C/F/Sob.     HPI: Patient is a 52 y/o male who presents with worsening right foot pain and wound x2 weeks for which he was prescribed keflex course which he completed and a clindamycin course which he just started. Patient has PMH significant for DM, CHF. Patient reports that a "contractor grade staple" punctured his foot and he didn't realize until the next morning when he couldn't bear weight. He reports he is up to date on his tetanus shot. He reports chills but denies fever as he "did not check it" and ascending leg pain at times. Denies shortness of breath, chest pain or pressure, nausea or vomiting.      Medications aspirin enteric coated 81 milliGRAM(s) Oral daily  atorvastatin 80 milliGRAM(s) Oral at bedtime  cefepime   IVPB 2000 milliGRAM(s) IV Intermittent every 12 hours  chlorhexidine 2% Cloths 1 Application(s) Topical <User Schedule>  influenza   Vaccine 0.5 milliLiter(s) IntraMuscular once  insulin lispro (ADMELOG) corrective regimen sliding scale   SubCutaneous Before meals and at bedtime  insulin lispro Injectable (ADMELOG) 2 Unit(s) SubCutaneous three times a day before meals  melatonin 3 milliGRAM(s) Oral at bedtime  ondansetron Injectable 4 milliGRAM(s) IV Push every 6 hours PRN  sacubitril 24 mG/valsartan 26 mG 1 Tablet(s) Oral two times a day  vancomycin  IVPB 1000 milliGRAM(s) IV Intermittent every 12 hours    FH: No pertinent family history    ,   PMH: CVA (cerebral vascular accident)    HTN (hypertension)    Anxiety    DM (diabetes mellitus)    Chronic CHF    Chronic systolic congestive heart failure       PSH: No pertinent past surgical history    Labs                          10.0   10.61 )-----------( 375      ( 21 Oct 2021 04:40 )             30.6      10-21    138  |  102  |  14  ----------------------------<  140<H>  4.2   |  31  |  1.16    Ca    8.6      21 Oct 2021 04:40    TPro  6.9  /  Alb  2.4<L>  /  TBili  0.4  /  DBili  x   /  AST  10  /  ALT  22  /  AlkPhos  66  10-21     Vital Signs Last 24 Hrs  T(C): 37 (21 Oct 2021 05:30), Max: 37.1 (20 Oct 2021 19:49)  T(F): 98.6 (21 Oct 2021 05:30), Max: 98.8 (20 Oct 2021 19:49)  HR: 86 (21 Oct 2021 12:34) (81 - 92)  BP: 103/76 (21 Oct 2021 12:34) (102/64 - 120/74)  BP(mean): 79 (20 Oct 2021 14:38) (79 - 85)  RR: 17 (21 Oct 2021 05:30) (14 - 20)  SpO2: 100% (21 Oct 2021 12:34) (95% - 100%)  Sedimentation Rate, Erythrocyte: 98 mm/Hr (10-21-21 @ 04:40)  Sedimentation Rate, Erythrocyte: 99 mm/Hr (10-15-21 @ 13:37)         C-Reactive Protein, Serum: 106 mg/L (10-16-21 @ 01:09)  WBC Count: 10.61 K/uL *H* (10-21-21 @ 04:40)    PT/INR - ( 20 Oct 2021 10:43 )   PT: 15.2 sec;   INR: 1.29 ratio      CAPILLARY BLOOD GLUCOSE    POCT Blood Glucose.: 182 mg/dL (21 Oct 2021 11:31)  POCT Blood Glucose.: 189 mg/dL (21 Oct 2021 07:39)  POCT Blood Glucose.: 166 mg/dL (20 Oct 2021 22:36)  POCT Blood Glucose.: 199 mg/dL (20 Oct 2021 21:18)  POCT Blood Glucose.: 132 mg/dL (20 Oct 2021 16:37)  POCT Blood Glucose.: 179 mg/dL (20 Oct 2021 14:27)    ROS: All others negative unless otherwise stated in the HPI       PHYSICAL EXAM  GEN: CLARIBEL REICH is a pleasant well-nourished, well developed 51y Male in no acute distress, alert awake, and oriented to person, place and time.   LE Focused:    Vasc: DP/PT pulses faintly palpable, CFT < 5 seconds to digits, TG warm to cool, pitting edema present on bilateral legs  Derm: Dressing and wound vac intact on the right foot  Neuro: Protective sensation grossly diminished  MSK: Able to wiggles toes,     10/15: s/p bedside I&D: tracking noted in all directions, probes to bone, serosanguinous drainage noted       Imaging:    EXAM:  MR FOOT RT                          PROCEDURE DATE:  10/18/2021      INTERPRETATION:  EXAMINATION: MR FOOT RIGHT    CLINICAL INDICATION:Evaluate for osteomyelitis    COMPARISON: Radiographs dated 10/15/2021    TECHNIQUE: Multiplanar, multi-sequence MRI of the right foot was performed without intravenous contrast.    INTERPRETATION:    Bones and soft tissues: There is no fracture. There is patchy STIR hyperintensity within the second digit proximal phalanx with mild patchy T1 hypointensity seen on the short axis TI images. Similar finding is seen at the base of the third digit proximal phalanx. The findings are favored to represent early osteomyelitis, with reactive osteitis considered less likely.    There is extensive surrounding soft tissue edema and phlegmon at the second and third digits which may represent abscess-in -formation with blistering of the overlying skin. Multiple foci of hypointensity seen on series 6 and 7 at the second and third digits may be related to vascular atherosclerosis versus foci of soft tissue gas. Advise radiographic correlation (prior radiographs are dated 10/15/2021).    Muscles: There is extensive edema within the intrinsic muscles of the foot which may be seen in neuropathy.    IMPRESSION:  1.  Findings favored to represent osteomyelitis involving second and third digit proximal phalanges.    2.  Extensive overlying soft tissue infection involving the second and third digits with surrounding edema and phlegmon, may represent abscess-in-formation. Additionally, multiple rounded foci of hypointensity in soft tissue at the second and third digits may be related to vascular atherosclerosis versus foci of soft tissue gas. Advise radiographic correlation (prior radiographs are dated 10/15/2021).      EXAM:  XR FOOT 2 VIEWS RT                          PROCEDURE DATE:  10/15/2021        INTERPRETATION:  Cellulitis swollen right foot.    3 views right foot.    No fracture or dislocation. No focal bone lysis or unusual periosteal reaction. Joint spaces preserved. Small calcaneal osteophytes. Vascular calcification. Soft tissue swelling mostly over the dorsum of the foot may reflect cellulitis. No soft tissue gas.    IMPRESSION: No acute or destructive osseous pathology.    If there is clinical suspicion of osteomyelitis consider bone scan or MRI        EXAM:  US PHYSIOL LWR EXT 3+ LEV BI                          PROCEDURE DATE:  10/16/2021      INTERPRETATION:  Clinical indication: Ischemic toe    COMPARISON: None    FINDINGS: There are pulsatile waveforms bilaterally. Segmental pressures could not be obtained due to calcified, noncompressible vessels. Therefore, ABIs could not be calculated.    IMPRESSION: ABIs could not be calculated due to calcified, noncompressible vessels.      Culture  Specimen Source: .Surgical Swab R foot plantar wound (10.16.21 @ 02:28) Culture Results:   Few Streptococcus agalactiae (Group B) isolated   Group B streptococci are susceptible to ampicillin,   penicillin and cefazolin, but may be resistant to   erythromycin and clindamycin.   Recommendations for intrapartum prophylaxis for Group B   streptococci are penicillin or ampicillin. (10.16.21 @ 02:28)

## 2021-10-21 NOTE — PROGRESS NOTE ADULT - ATTENDING COMMENTS
Patient seen and examined this morning with Housestaff/ Medical students.     51 year old male with PMH CVA (10 years ago, no deficits), HTN, DM, CHF (EF 30-35% 2/21), presented with right foot swelling. Patient stated two weeks ago he noticed bleeding in his shoes and found a staple stuck to the bottom of his feet. He didn't notice it before because he states he doesn't have any sensation in his feet. He went to urgent care, where they prescribed him Keflex and due to progressive worsening leg swelling send from Urgent Care to ED. Patient admitted with Cellulitis and Abscess found to have osteomyelitis to the second and third toe s/p amputation in OR yesterday.     Patient doing okay without new complaints. somewhat more pleasant and co-operative, Pain not as well controlled today despite IV Tylenol.     Vital Signs Last 24 Hrs: Reviewed   Vital Signs Last 24 Hrs  T(C): 36.3 (21 Oct 2021 13:50), Max: 37.1 (20 Oct 2021 19:49)  T(F): 97.3 (21 Oct 2021 13:50), Max: 98.8 (20 Oct 2021 19:49)  HR: 81 (21 Oct 2021 13:50) (81 - 91)  BP: 114/73 (21 Oct 2021 13:50) (102/64 - 114/73)  BP(mean): 82 (21 Oct 2021 13:50) (82 - 82)  RR: 16 (21 Oct 2021 13:50) (16 - 18)  SpO2: 100% (21 Oct 2021 13:50) (98% - 100%)    P/E: As above  middle aged male, OOB to chair, NAD  Depressed mood although somewhat more pleasant  Neuro: AAO x3; No  gross focal deficits  CVS: S1S2 present, regular  Resp: BLAE+, No wheeze or Rhonchi  GI: Soft, BS+, NT  Extr: No edema or calf tenderness Left leg  Right foot dressing intact with wound vac in place    Labs: Reviewed                                 10.0   10.61 )-----------( 375      ( 21 Oct 2021 04:40 )             30.6   10-21    138  |  102  |  14  ----------------------------<  140<H>  4.2   |  31  |  1.16    Ca    8.6      21 Oct 2021 04:40    TPro  6.9  /  Alb  2.4<L>  /  TBili  0.4  /  DBili  x   /  AST  10  /  ALT  22  /  AlkPhos  66  10-21      MR Foot No Cont, Right (10.18.21 @ 12:44)  IMPRESSION:  1.  Findings favored to represent osteomyelitis involving second and third digit proximal phalanges.  2.  Extensive overlying soft tissue infection involving the second and third digits with surrounding edema and phlegmon, may represent abscess-in-formation. Additionally, multiple rounded foci of hypointensity in soft tissue at the second and third digits may be related to vascular atherosclerosis versus foci of soft tissue gas. Advise radiographic correlation (prior radiographs are dated 10/15/2021).      A/P:  Cellulitis of the Right foot with suspected abscess/ Osteomyelitis second and third digit s/p amputation  PVD   DM with likely Neuropathy  Chronic systolic CHF  Severe Pulmonary HTN  CEDRIC improved   HTN   Non compliance with medication regimen  ?? Depressed mood    Plan:  MRI showing OM, Cont Vancomycin and Cefepime s/p amputation of right 2nd toe possible 3rd toe amputation  Follow vanc trough, ID follow up for duration of antibiotics  Discussed with ID will follow up Bone biopsy from proximal margin of amputation site  Podiatry follow up noted; Planned for delayed closure of the wound Monday/ Tuesday  MIGDALIA/PVR showed non compressible vessels. Vascular surgery on board. CT Angio with mild occlusion without intervention; may benefit form antiplatelet therapy once podiatrically stable  Spoke with Patient again; counseled on  importance of better medication compliance. d/w Dr. Fairchild who will also d/w patient  He was strongly encouraged to at least take medications for his Heart failure which can improve his cardiac function.   Diet and Exercise counseled to improve BP and sugar control  DVT ppx    Discussed with Housestaff at length findings and plan of care  Discussed with Patient Patient seen and examined this morning with Housestaff/ Medical students.     51 year old male with PMH CVA (10 years ago, no deficits), HTN, DM, CHF (EF 30-35% 2/21), presented with right foot swelling. Patient stated two weeks ago he noticed bleeding in his shoes and found a staple stuck to the bottom of his feet. He didn't notice it before because he states he doesn't have any sensation in his feet. He went to urgent care, where they prescribed him Keflex and due to progressive worsening leg swelling send from Urgent Care to ED. Patient admitted with Cellulitis and Abscess found to have osteomyelitis to the second and third toe s/p amputation in OR yesterday.     Patient doing okay without new complaints. somewhat more pleasant and co-operative, Pain not as well controlled today despite IV Tylenol.     Vital Signs Last 24 Hrs: Reviewed   Vital Signs Last 24 Hrs  T(C): 36.3 (21 Oct 2021 13:50), Max: 37.1 (20 Oct 2021 19:49)  T(F): 97.3 (21 Oct 2021 13:50), Max: 98.8 (20 Oct 2021 19:49)  HR: 81 (21 Oct 2021 13:50) (81 - 91)  BP: 114/73 (21 Oct 2021 13:50) (102/64 - 114/73)  BP(mean): 82 (21 Oct 2021 13:50) (82 - 82)  RR: 16 (21 Oct 2021 13:50) (16 - 18)  SpO2: 100% (21 Oct 2021 13:50) (98% - 100%)    P/E: As above  middle aged male, OOB to chair, NAD  Depressed mood although somewhat more pleasant  Neuro: AAO x3; No  gross focal deficits  CVS: S1S2 present, regular  Resp: BLAE+, No wheeze or Rhonchi  GI: Soft, BS+, NT  Extr: No edema or calf tenderness Left leg  Right foot dressing intact with wound vac in place    Labs: Reviewed                                 10.0   10.61 )-----------( 375      ( 21 Oct 2021 04:40 )             30.6   10-21    138  |  102  |  14  ----------------------------<  140<H>  4.2   |  31  |  1.16    Ca    8.6      21 Oct 2021 04:40    TPro  6.9  /  Alb  2.4<L>  /  TBili  0.4  /  DBili  x   /  AST  10  /  ALT  22  /  AlkPhos  66  10-21      MR Foot No Cont, Right (10.18.21 @ 12:44)  IMPRESSION:  1.  Findings favored to represent osteomyelitis involving second and third digit proximal phalanges.  2.  Extensive overlying soft tissue infection involving the second and third digits with surrounding edema and phlegmon, may represent abscess-in-formation. Additionally, multiple rounded foci of hypointensity in soft tissue at the second and third digits may be related to vascular atherosclerosis versus foci of soft tissue gas. Advise radiographic correlation (prior radiographs are dated 10/15/2021).      A/P:  Cellulitis of the Right foot with suspected abscess/ Osteomyelitis second and third digit s/p amputation  PVD   DM with likely Neuropathy  Chronic systolic CHF  Severe Pulmonary HTN  CEDRIC improved   HTN   Non compliance with medication regimen  ?? Depressed mood    Plan:  MRI showing OM, Cont Vancomycin and Cefepime s/p amputation of right 2nd toe possible 3rd toe amputation  Follow vanc trough, ID follow up for duration of antibiotics  Discussed with ID will follow up Bone biopsy from proximal margin of amputation site  Podiatry follow up noted; Planned for delayed closure of the wound Monday/ Tuesday  Add Oxycodone PRN q 6hrs for better pain control  MIGDALIA/PVR showed non compressible vessels. Vascular surgery on board. CT Angio with mild occlusion without intervention; may benefit form antiplatelet therapy once podiatrically stable; Continue Aspirin for now  Spoke with Patient again; counseled on  importance of better medication compliance. d/w Dr. Fairchild who will also d/w patient medication adherence.  He was strongly encouraged to at least take medications for his Heart failure which can improve his cardiac function.   Diet and Exercise counseled to improve BP and sugar control  DVT ppx    Discussed with Medical sgtudent/ Housestaff at length findings and plan of care  Discussed with Patient and RN Patient seen and examined this morning with Housestaff/ Medical students.     51 year old male with PMH CVA (10 years ago, no deficits), HTN, DM, CHF (EF 30-35% 2/21), presented with right foot swelling. Patient stated two weeks ago he noticed bleeding in his shoes and found a staple stuck to the bottom of his feet. He didn't notice it before because he states he doesn't have any sensation in his feet. He went to urgent care, where they prescribed him Keflex and due to progressive worsening leg swelling send from Urgent Care to ED. Patient admitted with Cellulitis and Abscess found to have osteomyelitis to the second and third toe s/p amputation in OR yesterday.     Patient doing okay without new complaints. somewhat more pleasant and co-operative, Pain not as well controlled today despite IV Tylenol.   denies fever, chills, SOB, palpitations, chest pain, nausea, vomiting, diarrhea, constipation, dizziness      Vital Signs Last 24 Hrs: Reviewed   T(C): 36.3 (21 Oct 2021 13:50), Max: 37.1 (20 Oct 2021 19:49)  T(F): 97.3 (21 Oct 2021 13:50), Max: 98.8 (20 Oct 2021 19:49)  HR: 81 (21 Oct 2021 13:50) (81 - 91)  BP: 114/73 (21 Oct 2021 13:50) (102/64 - 114/73)  BP(mean): 82 (21 Oct 2021 13:50) (82 - 82)  RR: 16 (21 Oct 2021 13:50) (16 - 18)  SpO2: 100% (21 Oct 2021 13:50) (98% - 100%)    P/E: As above  middle aged male, OOB to chair, NAD  Neck: No JVD, no thyromegaly  Depressed mood although somewhat more pleasant  Neuro: AAO x3; No  gross focal deficits  CVS: S1S2 present, regular  Resp: BLAE+, No wheeze or Rhonchi  GI: Soft, BS+, NT  Extr: No edema or calf tenderness Left leg  Right foot dressing intact with wound vac in place    Labs: Reviewed                                 10.0   10.61 )-----------( 375      ( 21 Oct 2021 04:40 )             30.6   10-21    138  |  102  |  14  ----------------------------<  140<H>  4.2   |  31  |  1.16    Ca    8.6      21 Oct 2021 04:40    TPro  6.9  /  Alb  2.4<L>  /  TBili  0.4  /  DBili  x   /  AST  10  /  ALT  22  /  AlkPhos  66  10-21    Vancomycin Level, Trough (10.21.21 @ 04:40) Vancomycin Level, Trough: 18.3:    MR Foot No Cont, Right (10.18.21 @ 12:44)  IMPRESSION:  1.  Findings favored to represent osteomyelitis involving second and third digit proximal phalanges.  2.  Extensive overlying soft tissue infection involving the second and third digits with surrounding edema and phlegmon, may represent abscess-in-formation. Additionally, multiple rounded foci of hypointensity in soft tissue at the second and third digits may be related to vascular atherosclerosis versus foci of soft tissue gas. Advise radiographic correlation (prior radiographs are dated 10/15/2021).    A/P:  Cellulitis of the Right foot with suspected abscess/ Osteomyelitis second and third digit s/p amputation  PVD   DM with likely Neuropathy  Chronic systolic CHF  Severe Pulmonary HTN  CEDRIC improved   HTN   Non compliance with medication regimen  ?? Depressed mood    Plan:  MRI showing OM, Cont Vancomycin and Cefepime s/p amputation of right 2nd and 3rd toe amputation  Vanco trough therapeutic; ID follow up for duration of antibiotics  Discussed with ID will follow up Bone biopsy from proximal margin of amputation site  Podiatry follow up noted; Planned for delayed closure of the wound Monday/ Tuesday  Add Oxycodone PRN q 6hrs for better pain control  MIGDALIA/PVR showed non compressible vessels. Vascular surgery on board. CT Angio with mild occlusion without intervention; may benefit form antiplatelet therapy once podiatrically stable; Continue Aspirin for now  Spoke with Patient again; counseled on  importance of better medication compliance. d/w Dr. Fairchild who will also d/w patient medication adherence.  He was strongly encouraged to at least take medications for his Heart failure which can improve his cardiac function.   Diet and Exercise counseled to improve BP and sugar control  DVT ppx    Discussed with Medical student/ Housestaff at length findings and plan of care  Discussed with Patient and RN

## 2021-10-21 NOTE — PROGRESS NOTE ADULT - PROBLEM SELECTOR PLAN 1
Cont Vancomycin and Cefepime  Vanc Trough 10/19 17.8  F/u Vanc Trough 10/21  Dr Mesa - ID consulted  Cx: Few Streptococcus agalactiae (Group B) isolated sensitive to Ampicillin and Penicillin  MRI:  Findings favored to represent osteomyelitis involving second and third digit proximal phalanges.  Extensive overlying soft tissue infection involving the second and third digits with surrounding edema and phlegmon, may represent abscess-in-formation. Additionally, multiple rounded foci of hypointensity in soft tissue at the second and third digits may be related to vascular atherosclerosis versus foci of soft tissue gas.  MIGDALIA/PVR shows noncompressible vessels   Vascular consulted - Angiogram shows Inline flow to R foot via dominant PT and smaller peroneal with AT occlusion  Podiatry consulted - recommends amputation scheduled 10/20 Cont Vancomycin and Cefepime  Vanc Trough 10/19 17.8  Vanc Trough 10/21 18.3  F/u Vanc Trough 10/23  Dr Mesa - ID consulted  Cx: Few Streptococcus agalactiae (Group B) isolated sensitive to Ampicillin and Penicillin  MRI:  Findings favored to represent osteomyelitis involving second and third digit proximal phalanges.  Extensive overlying soft tissue infection involving the second and third digits with surrounding edema and phlegmon, may represent abscess-in-formation. Additionally, multiple rounded foci of hypointensity in soft tissue at the second and third digits may be related to vascular atherosclerosis versus foci of soft tissue gas.  MIGDALIA/PVR shows noncompressible vessels   Vascular consulted - Angiogram shows Inline flow to R foot via dominant PT and smaller peroneal with AT occlusion  Podiatry consulted - pt. had 2nd and 3rd digit of R. Foot amputated on 10/20  c/w Abx as per ID Cont Vancomycin and Cefepime  Vanc Trough 10/19 17.8  Vanc Trough 10/21 18.3  F/u Vanc Trough 10/23  Dr Mesa - ID consulted  Cx: Few Streptococcus agalactiae (Group B) isolated sensitive to Ampicillin and Penicillin  MRI:  Findings favored to represent osteomyelitis involving second and third digit proximal phalanges.  Extensive overlying soft tissue infection involving the second and third digits with surrounding edema and phlegmon, may represent abscess-in-formation. Additionally, multiple rounded foci of hypointensity in soft tissue at the second and third digits may be related to vascular atherosclerosis versus foci of soft tissue gas.  MIGDALIA/PVR shows noncompressible vessels   Vascular consulted - Angiogram shows Inline flow to R foot via dominant PT and smaller peroneal with AT occlusion  Podiatry consulted - pt. had 2nd and 3rd digit of R. Foot amputated on 10/20  f/u Podiatry plan - Wound vac change tomorrow, plan for delayed primary closure of the wound Monday/Tuesday  c/w Abx as per ID Cont Vancomycin and Cefepime  Vanc Trough 10/21 18.3  F/u Vanc Trough 10/23  Dr Mesa - ID consulted  Cx: Few Streptococcus agalactiae (Group B) isolated sensitive to Ampicillin and Penicillin  MRI:  Findings favored to represent osteomyelitis involving second and third digit proximal phalanges.  Extensive overlying soft tissue infection involving the second and third digits with surrounding edema and phlegmon, may represent abscess-in-formation. Additionally, multiple rounded foci of hypointensity in soft tissue at the second and third digits may be related to vascular atherosclerosis versus foci of soft tissue gas.  MIGDALIA/PVR shows noncompressible vessels   Vascular consulted - Angiogram shows Inline flow to R foot via dominant PT and smaller peroneal with AT occlusion  Podiatry consulted - pt. had 2nd and 3rd digit of R. Foot amputated on 10/20  f/u Podiatry plan - Wound vac change tomorrow, plan for delayed primary closure of the wound Monday/Tuesday  c/w Abx as per ID

## 2021-10-21 NOTE — PROGRESS NOTE ADULT - SUBJECTIVE AND OBJECTIVE BOX
SUBJ:      MEDICATIONS  (STANDING):  aspirin enteric coated 81 milliGRAM(s) Oral daily  atorvastatin 80 milliGRAM(s) Oral at bedtime  cefepime   IVPB 2000 milliGRAM(s) IV Intermittent every 12 hours  chlorhexidine 2% Cloths 1 Application(s) Topical <User Schedule>  influenza   Vaccine 0.5 milliLiter(s) IntraMuscular once  insulin lispro (ADMELOG) corrective regimen sliding scale   SubCutaneous Before meals and at bedtime  insulin lispro Injectable (ADMELOG) 2 Unit(s) SubCutaneous three times a day before meals  melatonin 3 milliGRAM(s) Oral at bedtime  sacubitril 24 mG/valsartan 26 mG 1 Tablet(s) Oral two times a day  vancomycin  IVPB 1000 milliGRAM(s) IV Intermittent every 12 hours    MEDICATIONS  (PRN):  ondansetron Injectable 4 milliGRAM(s) IV Push every 6 hours PRN Nausea and/or Vomiting            Vital Signs Last 24 Hrs  T(C): 37 (21 Oct 2021 05:30), Max: 37.1 (20 Oct 2021 19:49)  T(F): 98.6 (21 Oct 2021 05:30), Max: 98.8 (20 Oct 2021 19:49)  HR: 88 (21 Oct 2021 05:30) (81 - 92)  BP: 108/70 (21 Oct 2021 05:30) (102/64 - 120/74)  BP(mean): 79 (20 Oct 2021 14:38) (79 - 85)  RR: 17 (21 Oct 2021 05:30) (14 - 20)  SpO2: 98% (21 Oct 2021 05:30) (95% - 99%)    REVIEW OF SYSTEMS:  CONSTITUTIONAL: No fever, weight loss, or fatigue  EYES: No eye pain, visual disturbances, or discharge  ENMT:  No difficulty hearing, tinnitus, vertigo; No sinus or throat pain  NECK: No pain or stiffness  RESPIRATORY: No cough, wheezing, chills or hemoptysis; No shortness of breath  CARDIOVASCULAR: No chest pain, palpitations, dizziness, or leg swelling  GASTROINTESTINAL: No abdominal or epigastric pain. No nausea, vomiting, or hematemesis; No diarrhea or constipation. No melena or hematochezia.  GENITOURINARY: No dysuria, frequency, hematuria, or incontinence  NEUROLOGICAL: No headaches, memory loss, loss of strength, numbness, or tremors  SKIN: No itching, burning, rashes, or lesions   LYMPH NODES: No enlarged glands  ENDOCRINE: No heat or cold intolerance; No hair loss  MUSCULOSKELETAL: No joint pain or swelling; No muscle, back, or extremity pain  PSYCHIATRIC: No depression, anxiety, mood swings, or difficulty sleeping  HEME/LYMPH: No easy bruising, or bleeding gums  ALLERY AND IMMUNOLOGIC: No hives or eczema    PHYSICAL EXAM:  · CONSTITUTIONAL:	Well-developed, well nourished    BMI-  · EYES:	EOMI; PERRL; no drainage or redness  · ENMT	No oral lesions; no gross abnormalities  · NECK:	No bruits; no thyromegaly or nodules  ·BACK:	No deformity or limitation of movement  ·RESPIRATORY:   airway patent; breath sounds equal; good air movement; respirations non-labored; clear to auscultation bilaterally; no chest wall tenderness; no intercostal retractions; no rales,rhonchi or wheeze  · CARDIOVASCULAR	regular rate and rhythm  no rub  no murmur  normal PMI  . GASTROINTESTINAL:  no distention; no masses palpable; bowel sounds normal; no rebound tenderness; no guarding; no rigidity; no organomegaly  · EXTREMITIES: No cyanosis, clubbing or edema  · VASCULAR: 	Equal and normal pulses (carotid, femoral, dorsalis pedis)  ·NEUROLOGICAL:   alert and oriented x 3; sensation intact; deep reflexes intact; cranial nerves intact; no spontaneous movement; superficial reflexes intact; normal strength  · SKIN:	No lesions; no rash  . LYMPH NODES:	No lymphadedenopathy  · MUSCULOSKELETAL:   No calf tenderness  no joint swelling	  TELEMETRY:    ECG:    TTE:    LABS:                        10.0   10.61 )-----------( 375      ( 21 Oct 2021 04:40 )             30.6     10-21    138  |  102  |  14  ----------------------------<  140<H>  4.2   |  31  |  1.16    Ca    8.6      21 Oct 2021 04:40    TPro  6.9  /  Alb  2.4<L>  /  TBili  0.4  /  DBili  x   /  AST  10  /  ALT  22  /  AlkPhos  66  10-21        PT/INR - ( 20 Oct 2021 10:43 )   PT: 15.2 sec;   INR: 1.29 ratio           Creatinine Trend: 1.16<--, 1.05<--, 1.05<--, 1.04<--, 1.13<--, 1.24<--  I&O's Summary    BNP  RADIOLOGY & ADDITIONAL STUDIES:    IMPRESSION AND PLAN:         SUBJ: Patient seen and examined at bedside. Patient states having pain in lower extremities mainly in right foot. Patient continues to refuse his coreg as it states it causes him to have side effects. Denies any other acute concerns or complaints at this time.     MEDICATIONS  (STANDING):  aspirin enteric coated 81 milliGRAM(s) Oral daily  atorvastatin 80 milliGRAM(s) Oral at bedtime  cefepime   IVPB 2000 milliGRAM(s) IV Intermittent every 12 hours  chlorhexidine 2% Cloths 1 Application(s) Topical <User Schedule>  influenza   Vaccine 0.5 milliLiter(s) IntraMuscular once  insulin lispro (ADMELOG) corrective regimen sliding scale   SubCutaneous Before meals and at bedtime  insulin lispro Injectable (ADMELOG) 2 Unit(s) SubCutaneous three times a day before meals  melatonin 3 milliGRAM(s) Oral at bedtime  sacubitril 24 mG/valsartan 26 mG 1 Tablet(s) Oral two times a day  vancomycin  IVPB 1000 milliGRAM(s) IV Intermittent every 12 hours    MEDICATIONS  (PRN):  ondansetron Injectable 4 milliGRAM(s) IV Push every 6 hours PRN Nausea and/or Vomiting    Vital Signs Last 24 Hrs  T(C): 37 (21 Oct 2021 05:30), Max: 37.1 (20 Oct 2021 19:49)  T(F): 98.6 (21 Oct 2021 05:30), Max: 98.8 (20 Oct 2021 19:49)  HR: 88 (21 Oct 2021 05:30) (81 - 92)  BP: 108/70 (21 Oct 2021 05:30) (102/64 - 120/74)  BP(mean): 79 (20 Oct 2021 14:38) (79 - 85)  RR: 17 (21 Oct 2021 05:30) (14 - 20)  SpO2: 98% (21 Oct 2021 05:30) (95% - 99%)    REVIEW OF SYSTEMS:  CONSTITUTIONAL: No fever or fatigue  EYES: No eye pain   ENMT: No difficulty hearing   NECK: No pain   RESPIRATORY: No cough or wheezing; No shortness of breath  CARDIOVASCULAR: No chest pain   GASTROINTESTINAL: No abdominal or epigastric pain. No nausea or vomiting; No diarrhea   GENITOURINARY: No dysuria   NEUROLOGICAL: No headaches  SKIN: No itching or burning    MUSCULOSKELETAL: pain in lower extremities mainly in right foot s/p procedure   PSYCHIATRIC: No depression or anxiety     PHYSICAL EXAM:  · CONSTITUTIONAL: Well-developed, well nourished   · EYES: EOMI; PERRL  · ENMT:	No gross abnormalities  · NECK:	supple   ·BACK: no deformity  ·RESPIRATORY: mild crackles present to auscultation; no wheezing  · CARDIOVASCULAR: regular rate and rhythm; no murmur    . GASTROINTESTINAL: no masses palpable; bowel sounds normal; no tenderness to palpation   · EXTREMITIES: No edema; ace bandage around right LE; mild tenderness to palpation of lower extremities   ·NEUROLOGICAL: alert and oriented x 3   · SKIN: No rash  -MUSCULOSKELETAL: No calf tenderness    LABS:                        10.0   10.61 )-----------( 375      ( 21 Oct 2021 04:40 )             30.6     10-21    138  |  102  |  14  ----------------------------<  140<H>  4.2   |  31  |  1.16    Ca    8.6      21 Oct 2021 04:40    TPro  6.9  /  Alb  2.4<L>  /  TBili  0.4  /  DBili  x   /  AST  10  /  ALT  22  /  AlkPhos  66  10-21      PT/INR - ( 20 Oct 2021 10:43 )   PT: 15.2 sec;   INR: 1.29 ratio         Creatinine Trend: 1.16<--, 1.05<--, 1.05<--, 1.04<--, 1.13<--, 1.24<--  I&O's Summary

## 2021-10-21 NOTE — PROGRESS NOTE ADULT - PROBLEM SELECTOR PLAN 2
Not compliant with medications Coreg 3.125 BID, and Lasix 20mg, lisinopril  C/w Coreg 3.125 daily   Primary cardiologist Dr. Fairchild  Echo ordered 10/19  Held Lasix dose in 10/19 will resume after angiogram Not compliant with medications Coreg 3.125 BID, and Lasix 20mg, lisinopril  Primary cardiologist Dr. Fairchild  Echo ordered 10/19 showing EF of 30-35%; GIDD  Held Lasix dose in 10/19 will resume after angiogram  Cardio consult 10/21 recommend to start entresto 26-24 BID  fluid restrictions; strict I/O; daily weight   encourage medication compliance Not compliant with medications  Primary cardiologist Dr. Fairchild  Echo ordered 10/19 showing EF of 30-35%; GIDD  Held Lasix dose in 10/19 will resume after angiogram  Cardio consult 10/21 recommend to start entresto 26-24 BID  fluid restrictions; strict I/O; daily weight   encourage medication compliance

## 2021-10-21 NOTE — PROGRESS NOTE ADULT - ASSESSMENT
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 Kitchen

## 2021-10-21 NOTE — PROGRESS NOTE ADULT - SUBJECTIVE AND OBJECTIVE BOX
PGY-1 Progress Note discussed with attending    PAGER #: [[phone]] TILL 5:00 PM  PLEASE CONTACT ON CALL TEAM:  - On Call Team (Please refer to Quintin) FROM 5:00 PM - 8:30PM  - Nightfloat Team FROM 8:30 -7:30 AM    CHIEF COMPLAINT & BRIEF HOSPITAL COURSE:  51 year old male with PMH CVA (10 years ago, no deficits), HTN, DM, CHF (EF 30-35% 2/21), presented with right foot swelling. Patient stated two weeks ago he noticed bleeding in his shoes and found a staple stuck to the bottom of his feet. He didn't notice it before because he states he doesn't have any sensation in his feet. He went to urgent care, where they prescribed him Keflex Q8hrs. Patient states the foot started to swell more so he returned to urgent care yesterday where they gave him a prescription of clindamycin. Patient this morning started to endorse chills, nausea, but denies any fever, vomiting, chest pain, SOB, abdominal pain, or changes in bowel habits.  Patient was admitted in February due to covid, and was found to have CHF and DM. Patient has not been complaint with any of his medications and states he just takes Lasix and Xanax. MRI 10/18 shows proximal OM of 2nd and 3rd digit of R. Foot. Angiogram 10/19 shows Inline flow to R foot via dominant PT and smaller peroneal with AT occlusion.     INTERVAL HPI/OVERNIGHT EVENTS:   Pt. 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 Pt. reports 7/10 dull pain in R. Foot   No events overnight. Pt complains of right foot neuropathic pain (6/10) and reports improved sleep. Pt. is still non-compliant with insulin and medications ordered for him in the hospital. Scheduled for R 2nd and 3rd digit amputation today with Dr. Kitchen.    REVIEW OF SYSTEMS:  CONSTITUTIONAL: No fever, chills, weight loss, or fatigue  RESPIRATORY: No dry cough, Denies wheezing, chills or hemoptysis; No shortness of breath  CARDIOVASCULAR: No chest pain, palpitations, dizziness, or leg swelling  GASTROINTESTINAL: No abdominal pain. No vomiting, or hematemesis; No diarrhea or constipation. No melena or hematochezia.  GENITOURINARY: No dysuria or hematuria, urinary frequency  NEUROLOGICAL: +ve numbness of feet b/l No headaches, memory loss, loss of strength  SKIN: Slightly gangrenous 2nd and 3rd toe of right foot. Mild pain to palpation    Vital Signs Last 24 Hrs  T(C): 36.6 (20 Oct 2021 05:30), Max: 37.1 (19 Oct 2021 20:00)  T(F): 97.8 (20 Oct 2021 05:30), Max: 98.8 (19 Oct 2021 20:00)  HR: 86 (20 Oct 2021 05:30) (83 - 98)  BP: 107/72 (20 Oct 2021 05:30) (107/72 - 130/85)  BP(mean): 99 (19 Oct 2021 22:00) (87 - 99)  RR: 16 (20 Oct 2021 05:30) (12 - 20)  SpO2: 99% (20 Oct 2021 05:30) (96% - 100%)    PHYSICAL EXAMINATION:  GENERAL: NAD, well built  HEAD:  Atraumatic, Normocephalic  EYES:  conjunctiva and sclera clear  NECK: Supple, No JVD, Normal thyroid  CHEST/LUNG: no crackles in b/l lung bases. No rales, rhonchi, wheezing, or rubs  HEART: Regular rate and rhythm; No murmurs, rubs, or gallops  ABDOMEN: Soft, Nontender, Nondistended; Bowel sounds present  NERVOUS SYSTEM:  Alert & Oriented X3,    EXTREMITIES:  1+ pitting edema b/l 2+ Peripheral Pulses, No clubbing, cyanosis  SKIN: slightly gangrenous 2nd and 3rd toe of right foot. skin partially removed, yellowish color under 3rd toe, decrease sensation, mild pain to palpation, faint pulses. s/p Angiogram bruising seen on anterior leg                                11.5   8.16  )-----------( 431      ( 20 Oct 2021 10:43 )             34.8       139  |  102  |  10  ----------------------------<  158<H>  3.9   |  33<H>  |  1.05    Ca    9.1      20 Oct 2021 10:43      CAPILLARY BLOOD GLUCOSE    POCT Blood Glucose.: 150 mg/dL (20 Oct 2021 11:35)  POCT Blood Glucose.: 156 mg/dL (20 Oct 2021 07:33)  POCT Blood Glucose.: 210 mg/dL (19 Oct 2021 23:40)  POCT Blood Glucose.: 153 mg/dL (19 Oct 2021 16:27)      RADIOLOGY & ADDITIONAL TESTS:    EXAM:  US PHYSIOL LWR EXT 3+ LEV BI                        PROCEDURE DATE:  10/16/2021    INTERPRETATION:  Clinical indication: Ischemic toe  COMPARISON: None  FINDINGS: There are pulsatile waveforms bilaterally. Segmental pressures could not be obtained due to calcified, noncompressible vessels. Therefore, ABIs could not be calculated.  IMPRESSION: ABIs could not be calculated due to calcified, noncompressible vessels.    ------------------------------------------------------------------------    EXAM:  XR FOOT 2 VIEWS RT                        PROCEDURE DATE:  10/15/2021    INTERPRETATION:  Cellulitis swollen right foot.  3 views right foot.  No fracture or dislocation. No focal bone lysis or unusual periosteal reaction. Joint spaces preserved. Small calcaneal osteophytes. Vascular calcification. Soft tissue swelling mostly over the dorsum of the foot may reflect cellulitis. No soft tissue gas.  IMPRESSION: No acute or destructive osseous pathology.  If there is clinical suspicion of osteomyelitis consider bone scan or MRI    ------------------------------------------------------------------------    EXAM:  MR FOOT RT                        PROCEDURE DATE:  10/18/2021    INTERPRETATION:  EXAMINATION: MR FOOT RIGHT  CLINICAL INDICATION: Evaluate for osteomyelitis  COMPARISON: Radiographs dated 10/15/2021  TECHNIQUE: Multiplanar, multi-sequence MRI of the right foot was performed without intravenous contrast.  INTERPRETATION:  Bones and soft tissues: There is no fracture. There is patchy STIR hyperintensity within the second digit proximal phalanx with mild patchy T1 hypointensity seen on the short axis TI images. Similar finding is seen at the base of the third digit proximal phalanx. The findings are favored to represent early osteomyelitis, with reactive osteitis considered less likely.  There is extensive surrounding soft tissue edema and phlegmon at the second and third digits which may represent abscess-in -formation with blistering of the overlying skin. Multiple foci of hypointensity seen on series 6 and 7 at the second and third digits may be related to vascular atherosclerosis versus foci of soft tissue gas. Advise radiographic correlation (prior radiographs are dated 10/15/2021).  Muscles: There is extensive edema within the intrinsic muscles of the foot which may be seen in neuropathy.  IMPRESSION:  1.  Findings favored to represent osteomyelitis involving second and third digit proximal phalanges.  2.  Extensive overlying soft tissue infection involving the second and third digits with surrounding edema and phlegmon, may represent abscess-in-formation. Additionally, multiple rounded foci of hypointensity in soft tissue at the second and third digits may be related to vascular atherosclerosis versus foci of soft tissue gas. Advise radiographic correlation (prior radiographs are dated 10/15/2021).    ------------------------------------------------------------------------    PROCEDURE: Transthoracic echocardiogram with 2-D, M-Mode  and complete spectral and color flow Doppler.  Study Date: 10/19/2021  INDICATION: Cardiomyopathy, unspecified (I42.9)  HISTORY:    DIMENSIONS:  Dimensions:     Normal Values:  LA:     4.9 cm    2.0 - 4.0 cm  Ao:     3.9 cm    2.0 - 3.8 cm  SEPTUM: 0.9 cm    0.6 - 1.2 cm  PWT:    1.0 cm    0.6 - 1.1 cm  LVIDd:  5.9 cm    3.0 - 5.6 cm  LVIDs:  5.9 cm    1.8 - 4.0 cm    Derived Variables:  LVMI: 101 g/m2  RWT: 0.33  Ejection Fraction Visual Estimate: 30-35 %  Ejection Fraction Stevens: 33 %    OBSERVATIONS:  Mitral Valve: Normal mitral valve. Mild mitral  regurgitation.  Aortic Root: Aortic Root: 3.9 cm.    Aortic Valve: Calcified trileaflet aortic valve with normal  opening. Mild aortic regurgitation.  Left Atrium: Normal left atrium.  LA volume index = 31  cc/m2.  Left Ventricle: Dilated lv with severe segmental left  ventricular systolic dysfunction.The distal  septum,apex,inferiorand infero-lateral walls are  hypokinetic EF=30-35%. Normal left ventricular internal  dimensions and wall thicknesses. Grade I diastolic  dysfunction (Impaired relaxation, mild).  Right Heart: Normal right atrium. Normal right ventricular  size and systolic function (TAPSE  1.7cm). There is mild  tricuspid regurgitation. Normal pulmonic valve.  Pericardium/PleuraNormal pericardium with no pericardial  effusion.  Hemodynamic: RV systolic pressure is severely increased at  65 mm Hg.    CONCLUSIONS:  1. Normal mitral valve. Mild mitral regurgitation.  2. Calcified trileaflet aortic valve with normal opening.  Mild aortic regurgitation.  3. Aortic Root: 3.9 cm.    4. Normal left atrium.  LA volume index = 31 cc/m2.  5. Normal left ventricular internal dimensions and wall  thicknesses.  6. Dilated lv with severe segmental left ventricular  systolic dysfunction.The distal septum,apex,inferiorand  infero-lateral walls are hypokinetic EF=30-35%.  7. Grade I diastolic dysfunction (Impaired relaxation,  mild).  8. Normal right atrium.  9. Normal right ventricular size and systolic function  (TAPSE  1.7cm).  10. RV systolic pressure is severely increased at  65 mm  Hg.  11. There is mild tricuspid regurgitation.  12. Normal pulmonic valve.  13. Normal pericardium with no pericardial effusion.    ------------------------------------------------------------------------    EXAM: R LE ANGIOGRAM (UNOFFICIAL READ)  Inline flow to R foot via dominant PT and smaller peroneal with AT occlusion       PGY-1 Progress Note discussed with attending    PAGER #: [[phone]] TILL 5:00 PM  PLEASE CONTACT ON CALL TEAM:  - On Call Team (Please refer to Quintin) FROM 5:00 PM - 8:30PM  - Nightfloat Team FROM 8:30 -7:30 AM    CHIEF COMPLAINT & BRIEF HOSPITAL COURSE:  51 year old male with PMH CVA (10 years ago, no deficits), HTN, DM, CHF (EF 30-35% 2/21), presented with right foot swelling. Patient stated two weeks ago he noticed bleeding in his shoes and found a staple stuck to the bottom of his feet. He didn't notice it before because he states he doesn't have any sensation in his feet. He went to urgent care, where they prescribed him Keflex Q8hrs. Patient states the foot started to swell more so he returned to urgent care yesterday where they gave him a prescription of clindamycin. Patient this morning started to endorse chills, nausea, but denies any fever, vomiting, chest pain, SOB, abdominal pain, or changes in bowel habits.  Patient was admitted in February due to covid, and was found to have CHF and DM. Patient has not been complaint with any of his medications and states he just takes Lasix and Xanax. MRI 10/18 shows proximal OM of 2nd and 3rd digit of R. Foot. Angiogram 10/19 shows Inline flow to R foot via dominant PT and smaller peroneal with AT occlusion. Pt. had amputation of the 2nd and 3rd digit on the R. foot on 10/20.    INTERVAL HPI/OVERNIGHT EVENTS:   Pt. 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 Pt. reports 7/10 dull pain in R. Foot. Pt. reports improved sleep. Still non-compliant with medications for DM, HTN, and CHF.    REVIEW OF SYSTEMS:  CONSTITUTIONAL: No fever, chills, weight loss, or fatigue  RESPIRATORY: No dry cough, Denies wheezing, chills or hemoptysis; No shortness of breath  CARDIOVASCULAR: No chest pain, palpitations, dizziness, or leg swelling  GASTROINTESTINAL: No abdominal pain. No vomiting, or hematemesis; No diarrhea or constipation. No melena or hematochezia.  GENITOURINARY: No dysuria or hematuria, urinary frequency  NEUROLOGICAL: No headaches, memory loss, loss of strength  SKIN: Slightly bruising and and discoloration on R anterior leg    Vital Signs Last 24 Hrs  T(C): 36.3 (21 Oct 2021 13:50), Max: 37.1 (20 Oct 2021 19:49)  T(F): 97.3 (21 Oct 2021 13:50), Max: 98.8 (20 Oct 2021 19:49)  HR: 81 (21 Oct 2021 13:50) (81 - 92)  BP: 114/73 (21 Oct 2021 13:50) (102/64 - 120/74)  BP(mean): 82 (21 Oct 2021 13:50) (79 - 85)  RR: 16 (21 Oct 2021 13:50) (14 - 20)  SpO2: 100% (21 Oct 2021 13:50) (95% - 100%)    PHYSICAL EXAMINATION:  GENERAL: NAD, well built  HEAD:  Atraumatic, Normocephalic  EYES:  conjunctiva and sclera clear  NECK: Supple, No JVD, Normal thyroid  CHEST/LUNG: no crackles in b/l lung bases. No rales, rhonchi, wheezing, or rubs  HEART: Regular rate and rhythm; No murmurs, rubs, or gallops  ABDOMEN: Soft, Nontender, Nondistended; Bowel sounds present  NERVOUS SYSTEM:  Alert & Oriented X3  EXTREMITIES:  1+ pitting edema b/l 2+ Peripheral Pulses, No clubbing, cyanosis  SKIN: s/p R. Foot 2nd and 3rd digit amputation, wound dressed. s/p Angiogram bruising seen on anterior leg                                           10.0   10.61 )-----------( 375      ( 21 Oct 2021 04:40 )             30.6       138  |  102  |  14  ----------------------------<  140<H>  4.2   |  31  |  1.16      Ca    8.6      21 Oct 2021 04:40    TPro  6.9  /  Alb  2.4<L>  /  TBili  0.4  /  DBili  x   /  AST  10  /  ALT  22  /  AlkPhos  66  10-21      CAPILLARY BLOOD GLUCOSE    POCT Blood Glucose.: 182 mg/dL (21 Oct 2021 11:31)  POCT Blood Glucose.: 189 mg/dL (21 Oct 2021 07:39)  POCT Blood Glucose.: 166 mg/dL (20 Oct 2021 22:36)  POCT Blood Glucose.: 199 mg/dL (20 Oct 2021 21:18)  POCT Blood Glucose.: 132 mg/dL (20 Oct 2021 16:37)  POCT Blood Glucose.: 179 mg/dL (20 Oct 2021 14:27)      RADIOLOGY & ADDITIONAL TESTS:    EXAM:  US PHYSIOL LWR EXT 3+ LEV BI                        PROCEDURE DATE:  10/16/2021    INTERPRETATION:  Clinical indication: Ischemic toe  COMPARISON: None  FINDINGS: There are pulsatile waveforms bilaterally. Segmental pressures could not be obtained due to calcified, noncompressible vessels. Therefore, ABIs could not be calculated.  IMPRESSION: ABIs could not be calculated due to calcified, noncompressible vessels.    ------------------------------------------------------------------------    EXAM:  XR FOOT 2 VIEWS RT                        PROCEDURE DATE:  10/15/2021    INTERPRETATION:  Cellulitis swollen right foot.  3 views right foot.  No fracture or dislocation. No focal bone lysis or unusual periosteal reaction. Joint spaces preserved. Small calcaneal osteophytes. Vascular calcification. Soft tissue swelling mostly over the dorsum of the foot may reflect cellulitis. No soft tissue gas.  IMPRESSION: No acute or destructive osseous pathology.  If there is clinical suspicion of osteomyelitis consider bone scan or MRI    ------------------------------------------------------------------------    EXAM:  MR FOOT RT                        PROCEDURE DATE:  10/18/2021    INTERPRETATION:  EXAMINATION: MR FOOT RIGHT  CLINICAL INDICATION: Evaluate for osteomyelitis  COMPARISON: Radiographs dated 10/15/2021  TECHNIQUE: Multiplanar, multi-sequence MRI of the right foot was performed without intravenous contrast.  INTERPRETATION:  Bones and soft tissues: There is no fracture. There is patchy STIR hyperintensity within the second digit proximal phalanx with mild patchy T1 hypointensity seen on the short axis TI images. Similar finding is seen at the base of the third digit proximal phalanx. The findings are favored to represent early osteomyelitis, with reactive osteitis considered less likely.  There is extensive surrounding soft tissue edema and phlegmon at the second and third digits which may represent abscess-in -formation with blistering of the overlying skin. Multiple foci of hypointensity seen on series 6 and 7 at the second and third digits may be related to vascular atherosclerosis versus foci of soft tissue gas. Advise radiographic correlation (prior radiographs are dated 10/15/2021).  Muscles: There is extensive edema within the intrinsic muscles of the foot which may be seen in neuropathy.  IMPRESSION:  1.  Findings favored to represent osteomyelitis involving second and third digit proximal phalanges.  2.  Extensive overlying soft tissue infection involving the second and third digits with surrounding edema and phlegmon, may represent abscess-in-formation. Additionally, multiple rounded foci of hypointensity in soft tissue at the second and third digits may be related to vascular atherosclerosis versus foci of soft tissue gas. Advise radiographic correlation (prior radiographs are dated 10/15/2021).    ------------------------------------------------------------------------    PROCEDURE: Transthoracic echocardiogram with 2-D, M-Mode  and complete spectral and color flow Doppler.  Study Date: 10/19/2021  INDICATION: Cardiomyopathy, unspecified (I42.9)  HISTORY:    DIMENSIONS:  Dimensions:     Normal Values:  LA:     4.9 cm    2.0 - 4.0 cm  Ao:     3.9 cm    2.0 - 3.8 cm  SEPTUM: 0.9 cm    0.6 - 1.2 cm  PWT:    1.0 cm    0.6 - 1.1 cm  LVIDd:  5.9 cm    3.0 - 5.6 cm  LVIDs:  5.9 cm    1.8 - 4.0 cm    Derived Variables:  LVMI: 101 g/m2  RWT: 0.33  Ejection Fraction Visual Estimate: 30-35 %  Ejection Fraction Stevens: 33 %    OBSERVATIONS:  Mitral Valve: Normal mitral valve. Mild mitral  regurgitation.  Aortic Root: Aortic Root: 3.9 cm.    Aortic Valve: Calcified trileaflet aortic valve with normal  opening. Mild aortic regurgitation.  Left Atrium: Normal left atrium.  LA volume index = 31  cc/m2.  Left Ventricle: Dilated lv with severe segmental left  ventricular systolic dysfunction.The distal  septum,apex,inferiorand infero-lateral walls are  hypokinetic EF=30-35%. Normal left ventricular internal  dimensions and wall thicknesses. Grade I diastolic  dysfunction (Impaired relaxation, mild).  Right Heart: Normal right atrium. Normal right ventricular  size and systolic function (TAPSE  1.7cm). There is mild  tricuspid regurgitation. Normal pulmonic valve.  Pericardium/PleuraNormal pericardium with no pericardial  effusion.  Hemodynamic: RV systolic pressure is severely increased at  65 mm Hg.    CONCLUSIONS:  1. Normal mitral valve. Mild mitral regurgitation.  2. Calcified trileaflet aortic valve with normal opening.  Mild aortic regurgitation.  3. Aortic Root: 3.9 cm.    4. Normal left atrium.  LA volume index = 31 cc/m2.  5. Normal left ventricular internal dimensions and wall  thicknesses.  6. Dilated lv with severe segmental left ventricular  systolic dysfunction.The distal septum,apex,inferiorand  infero-lateral walls are hypokinetic EF=30-35%.  7. Grade I diastolic dysfunction (Impaired relaxation,  mild).  8. Normal right atrium.  9. Normal right ventricular size and systolic function  (TAPSE  1.7cm).  10. RV systolic pressure is severely increased at  65 mm  Hg.  11. There is mild tricuspid regurgitation.  12. Normal pulmonic valve.  13. Normal pericardium with no pericardial effusion.    ------------------------------------------------------------------------    EXAM:  XR FOOT COMP MIN 3 VIEWS RT                        PROCEDURE DATE:  10/20/2021    INTERPRETATION:  Right foot  HISTORY: Postop  COMPARISON: 10/15/2021   Two views of the right foot show amputation of the second and third toes. Wound VAC device is present. The joint spaces are maintained.  IMPRESSION: Postoperative changes.         PGY-1 Progress Note discussed with attending    PAGER #: [[phone]] TILL 5:00 PM  PLEASE CONTACT ON CALL TEAM:  - On Call Team (Please refer to Quintin) FROM 5:00 PM - 8:30PM  - Nightfloat Team FROM 8:30 -7:30 AM    CHIEF COMPLAINT & BRIEF HOSPITAL COURSE:  51 year old male with PMH CVA (10 years ago, no deficits), HTN, DM, CHF (EF 30-35% 2/21), presented with right foot swelling. Patient stated two weeks ago he noticed bleeding in his shoes and found a staple stuck to the bottom of his feet. He didn't notice it before because he states he doesn't have any sensation in his feet. He went to urgent care, where they prescribed him Keflex Q8hrs. Patient states the foot started to swell more so he returned to urgent care yesterday where they gave him a prescription of clindamycin. Patient this morning started to endorse chills, nausea, but denies any fever, vomiting, chest pain, SOB, abdominal pain, or changes in bowel habits.  Patient was admitted in February due to covid, and was found to have CHF and DM. Patient has not been complaint with any of his medications and states he just takes Lasix and Xanax. MRI 10/18 shows proximal OM of 2nd and 3rd digit of R. Foot. Angiogram 10/19 shows Inline flow to R foot via dominant PT and smaller peroneal with AT occlusion. Pt. had amputation of the 2nd and 3rd digit on the R. foot on 10/20.    INTERVAL HPI/OVERNIGHT EVENTS:   Pt. 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 Pt. reports 7/10 dull pain in R. Foot. Pt. reports improved sleep. Still non-compliant with medications for DM, HTN, and CHF.    MEDICATIONS  (STANDING):  aspirin enteric coated 81 milliGRAM(s) Oral daily  atorvastatin 80 milliGRAM(s) Oral at bedtime  cefepime   IVPB 2000 milliGRAM(s) IV Intermittent every 12 hours  chlorhexidine 2% Cloths 1 Application(s) Topical <User Schedule>  influenza   Vaccine 0.5 milliLiter(s) IntraMuscular once  insulin lispro (ADMELOG) corrective regimen sliding scale   SubCutaneous Before meals and at bedtime  insulin lispro Injectable (ADMELOG) 2 Unit(s) SubCutaneous three times a day before meals  melatonin 3 milliGRAM(s) Oral at bedtime  sacubitril 24 mG/valsartan 26 mG 1 Tablet(s) Oral two times a day  vancomycin  IVPB 1000 milliGRAM(s) IV Intermittent every 12 hours    MEDICATIONS  (PRN):  ondansetron Injectable 4 milliGRAM(s) IV Push every 6 hours PRN Nausea and/or Vomiting  oxycodone    5 mG/acetaminophen 325 mG 1 Tablet(s) Oral every 6 hours PRN Moderate Pain (4 - 6)        REVIEW OF SYSTEMS:  CONSTITUTIONAL: No fever, chills, weight loss, or fatigue  RESPIRATORY: No dry cough, Denies wheezing, chills or hemoptysis; No shortness of breath  CARDIOVASCULAR: No chest pain, palpitations, dizziness, or leg swelling  GASTROINTESTINAL: No abdominal pain. No vomiting, or hematemesis; No diarrhea or constipation. No melena or hematochezia.  GENITOURINARY: No dysuria or hematuria, urinary frequency  NEUROLOGICAL: No headaches, memory loss, loss of strength  SKIN: Slightly bruising and and discoloration on R anterior leg    Vital Signs Last 24 Hrs  T(C): 36.3 (21 Oct 2021 13:50), Max: 37.1 (20 Oct 2021 19:49)  T(F): 97.3 (21 Oct 2021 13:50), Max: 98.8 (20 Oct 2021 19:49)  HR: 81 (21 Oct 2021 13:50) (81 - 92)  BP: 114/73 (21 Oct 2021 13:50) (102/64 - 120/74)  BP(mean): 82 (21 Oct 2021 13:50) (79 - 85)  RR: 16 (21 Oct 2021 13:50) (14 - 20)  SpO2: 100% (21 Oct 2021 13:50) (95% - 100%)    PHYSICAL EXAMINATION:  GENERAL: NAD, well built  HEAD:  Atraumatic, Normocephalic  EYES:  conjunctiva and sclera clear  NECK: Supple, No JVD, Normal thyroid  CHEST/LUNG: no crackles in b/l lung bases. No rales, rhonchi, wheezing, or rubs  HEART: Regular rate and rhythm; No murmurs, rubs, or gallops  ABDOMEN: Soft, Nontender, Nondistended; Bowel sounds present  NERVOUS SYSTEM:  Alert & Oriented X3  EXTREMITIES:  1+ pitting edema b/l 2+ Peripheral Pulses, No clubbing, cyanosis  SKIN: s/p R. Foot 2nd and 3rd digit amputation, wound dressed. s/p Angiogram bruising seen on anterior leg                                           10.0   10.61 )-----------( 375      ( 21 Oct 2021 04:40 )             30.6       138  |  102  |  14  ----------------------------<  140<H>  4.2   |  31  |  1.16      Ca    8.6      21 Oct 2021 04:40    TPro  6.9  /  Alb  2.4<L>  /  TBili  0.4  /  DBili  x   /  AST  10  /  ALT  22  /  AlkPhos  66  10-21      CAPILLARY BLOOD GLUCOSE    POCT Blood Glucose.: 182 mg/dL (21 Oct 2021 11:31)  POCT Blood Glucose.: 189 mg/dL (21 Oct 2021 07:39)  POCT Blood Glucose.: 166 mg/dL (20 Oct 2021 22:36)  POCT Blood Glucose.: 199 mg/dL (20 Oct 2021 21:18)  POCT Blood Glucose.: 132 mg/dL (20 Oct 2021 16:37)  POCT Blood Glucose.: 179 mg/dL (20 Oct 2021 14:27)      RADIOLOGY & ADDITIONAL TESTS:    EXAM:  US PHYSIOL LWR EXT 3+ LEV BI                        PROCEDURE DATE:  10/16/2021    INTERPRETATION:  Clinical indication: Ischemic toe  COMPARISON: None  FINDINGS: There are pulsatile waveforms bilaterally. Segmental pressures could not be obtained due to calcified, noncompressible vessels. Therefore, ABIs could not be calculated.  IMPRESSION: ABIs could not be calculated due to calcified, noncompressible vessels.    ------------------------------------------------------------------------    EXAM:  XR FOOT 2 VIEWS RT                        PROCEDURE DATE:  10/15/2021    INTERPRETATION:  Cellulitis swollen right foot.  3 views right foot.  No fracture or dislocation. No focal bone lysis or unusual periosteal reaction. Joint spaces preserved. Small calcaneal osteophytes. Vascular calcification. Soft tissue swelling mostly over the dorsum of the foot may reflect cellulitis. No soft tissue gas.  IMPRESSION: No acute or destructive osseous pathology.  If there is clinical suspicion of osteomyelitis consider bone scan or MRI    ------------------------------------------------------------------------    EXAM:  MR FOOT RT                        PROCEDURE DATE:  10/18/2021    INTERPRETATION:  EXAMINATION: MR FOOT RIGHT  CLINICAL INDICATION: Evaluate for osteomyelitis  COMPARISON: Radiographs dated 10/15/2021  TECHNIQUE: Multiplanar, multi-sequence MRI of the right foot was performed without intravenous contrast.  INTERPRETATION:  Bones and soft tissues: There is no fracture. There is patchy STIR hyperintensity within the second digit proximal phalanx with mild patchy T1 hypointensity seen on the short axis TI images. Similar finding is seen at the base of the third digit proximal phalanx. The findings are favored to represent early osteomyelitis, with reactive osteitis considered less likely.  There is extensive surrounding soft tissue edema and phlegmon at the second and third digits which may represent abscess-in -formation with blistering of the overlying skin. Multiple foci of hypointensity seen on series 6 and 7 at the second and third digits may be related to vascular atherosclerosis versus foci of soft tissue gas. Advise radiographic correlation (prior radiographs are dated 10/15/2021).  Muscles: There is extensive edema within the intrinsic muscles of the foot which may be seen in neuropathy.  IMPRESSION:  1.  Findings favored to represent osteomyelitis involving second and third digit proximal phalanges.  2.  Extensive overlying soft tissue infection involving the second and third digits with surrounding edema and phlegmon, may represent abscess-in-formation. Additionally, multiple rounded foci of hypointensity in soft tissue at the second and third digits may be related to vascular atherosclerosis versus foci of soft tissue gas. Advise radiographic correlation (prior radiographs are dated 10/15/2021).    ------------------------------------------------------------------------    PROCEDURE: Transthoracic echocardiogram with 2-D, M-Mode  and complete spectral and color flow Doppler.  Study Date: 10/19/2021  INDICATION: Cardiomyopathy, unspecified (I42.9)  HISTORY:    DIMENSIONS:  Dimensions:     Normal Values:  LA:     4.9 cm    2.0 - 4.0 cm  Ao:     3.9 cm    2.0 - 3.8 cm  SEPTUM: 0.9 cm    0.6 - 1.2 cm  PWT:    1.0 cm    0.6 - 1.1 cm  LVIDd:  5.9 cm    3.0 - 5.6 cm  LVIDs:  5.9 cm    1.8 - 4.0 cm    Derived Variables:  LVMI: 101 g/m2  RWT: 0.33  Ejection Fraction Visual Estimate: 30-35 %  Ejection Fraction Stevens: 33 %    OBSERVATIONS:  Mitral Valve: Normal mitral valve. Mild mitral  regurgitation.  Aortic Root: Aortic Root: 3.9 cm.    Aortic Valve: Calcified trileaflet aortic valve with normal  opening. Mild aortic regurgitation.  Left Atrium: Normal left atrium.  LA volume index = 31  cc/m2.  Left Ventricle: Dilated lv with severe segmental left  ventricular systolic dysfunction.The distal  septum,apex,inferiorand infero-lateral walls are  hypokinetic EF=30-35%. Normal left ventricular internal  dimensions and wall thicknesses. Grade I diastolic  dysfunction (Impaired relaxation, mild).  Right Heart: Normal right atrium. Normal right ventricular  size and systolic function (TAPSE  1.7cm). There is mild  tricuspid regurgitation. Normal pulmonic valve.  Pericardium/PleuraNormal pericardium with no pericardial  effusion.  Hemodynamic: RV systolic pressure is severely increased at  65 mm Hg.    CONCLUSIONS:  1. Normal mitral valve. Mild mitral regurgitation.  2. Calcified trileaflet aortic valve with normal opening.  Mild aortic regurgitation.  3. Aortic Root: 3.9 cm.    4. Normal left atrium.  LA volume index = 31 cc/m2.  5. Normal left ventricular internal dimensions and wall  thicknesses.  6. Dilated lv with severe segmental left ventricular  systolic dysfunction.The distal septum,apex,inferiorand  infero-lateral walls are hypokinetic EF=30-35%.  7. Grade I diastolic dysfunction (Impaired relaxation,  mild).  8. Normal right atrium.  9. Normal right ventricular size and systolic function  (TAPSE  1.7cm).  10. RV systolic pressure is severely increased at  65 mm  Hg.  11. There is mild tricuspid regurgitation.  12. Normal pulmonic valve.  13. Normal pericardium with no pericardial effusion.    ------------------------------------------------------------------------    EXAM:  XR FOOT COMP MIN 3 VIEWS RT                        PROCEDURE DATE:  10/20/2021    INTERPRETATION:  Right foot  HISTORY: Postop  COMPARISON: 10/15/2021   Two views of the right foot show amputation of the second and third toes. Wound VAC device is present. The joint spaces are maintained.  IMPRESSION: Postoperative changes.

## 2021-10-21 NOTE — PROGRESS NOTE ADULT - SKIN COMMENTS
right leg swelling has resolved, slight has some mild warmth of right leg, no erythema, right foot connected to a wound vac

## 2021-10-21 NOTE — PROGRESS NOTE ADULT - ASSESSMENT
Right foot and leg cellulitis  Fevers - resolved  Leukocytosis - normalized  Osteomyelitis of right foot - s/p amputation of 2nd and 3rd toes    Plan - Cont Vancomycin 1000mgs iv q12hrs   Cont Maxipime 1 gm iv q12hrs  Cont wound vac.

## 2021-10-21 NOTE — PROGRESS NOTE ADULT - PROBLEM SELECTOR PLAN 4
Patient noncompliant w/ meds   On Lisinopril 2.5mg at home Patient noncompliant w/ meds   On Lisinopril 2.5mg at home  monitor BP   start entresto   encourage medication compliance   DASH diet

## 2021-10-22 LAB
-  AMIKACIN: SIGNIFICANT CHANGE UP
-  AMIKACIN: SIGNIFICANT CHANGE UP
-  AMOXICILLIN/CLAVULANIC ACID: SIGNIFICANT CHANGE UP
-  AMOXICILLIN/CLAVULANIC ACID: SIGNIFICANT CHANGE UP
-  AMPICILLIN/SULBACTAM: SIGNIFICANT CHANGE UP
-  AMPICILLIN/SULBACTAM: SIGNIFICANT CHANGE UP
-  AMPICILLIN: SIGNIFICANT CHANGE UP
-  AMPICILLIN: SIGNIFICANT CHANGE UP
-  AZTREONAM: SIGNIFICANT CHANGE UP
-  AZTREONAM: SIGNIFICANT CHANGE UP
-  CEFAZOLIN: SIGNIFICANT CHANGE UP
-  CEFAZOLIN: SIGNIFICANT CHANGE UP
-  CEFEPIME: SIGNIFICANT CHANGE UP
-  CEFEPIME: SIGNIFICANT CHANGE UP
-  CEFOXITIN: SIGNIFICANT CHANGE UP
-  CEFOXITIN: SIGNIFICANT CHANGE UP
-  CEFTRIAXONE: SIGNIFICANT CHANGE UP
-  CEFTRIAXONE: SIGNIFICANT CHANGE UP
-  CIPROFLOXACIN: SIGNIFICANT CHANGE UP
-  CIPROFLOXACIN: SIGNIFICANT CHANGE UP
-  ERTAPENEM: SIGNIFICANT CHANGE UP
-  ERTAPENEM: SIGNIFICANT CHANGE UP
-  GENTAMICIN: SIGNIFICANT CHANGE UP
-  GENTAMICIN: SIGNIFICANT CHANGE UP
-  IMIPENEM: SIGNIFICANT CHANGE UP
-  IMIPENEM: SIGNIFICANT CHANGE UP
-  LEVOFLOXACIN: SIGNIFICANT CHANGE UP
-  LEVOFLOXACIN: SIGNIFICANT CHANGE UP
-  MEROPENEM: SIGNIFICANT CHANGE UP
-  MEROPENEM: SIGNIFICANT CHANGE UP
-  PIPERACILLIN/TAZOBACTAM: SIGNIFICANT CHANGE UP
-  PIPERACILLIN/TAZOBACTAM: SIGNIFICANT CHANGE UP
-  TOBRAMYCIN: SIGNIFICANT CHANGE UP
-  TOBRAMYCIN: SIGNIFICANT CHANGE UP
-  TRIMETHOPRIM/SULFAMETHOXAZOLE: SIGNIFICANT CHANGE UP
-  TRIMETHOPRIM/SULFAMETHOXAZOLE: SIGNIFICANT CHANGE UP
ANION GAP SERPL CALC-SCNC: 8 MMOL/L — SIGNIFICANT CHANGE UP (ref 5–17)
BUN SERPL-MCNC: 12 MG/DL — SIGNIFICANT CHANGE UP (ref 7–18)
CALCIUM SERPL-MCNC: 9.1 MG/DL — SIGNIFICANT CHANGE UP (ref 8.4–10.5)
CHLORIDE SERPL-SCNC: 101 MMOL/L — SIGNIFICANT CHANGE UP (ref 96–108)
CO2 SERPL-SCNC: 28 MMOL/L — SIGNIFICANT CHANGE UP (ref 22–31)
CREAT SERPL-MCNC: 1.04 MG/DL — SIGNIFICANT CHANGE UP (ref 0.5–1.3)
GLUCOSE BLDC GLUCOMTR-MCNC: 163 MG/DL — HIGH (ref 70–99)
GLUCOSE BLDC GLUCOMTR-MCNC: 182 MG/DL — HIGH (ref 70–99)
GLUCOSE BLDC GLUCOMTR-MCNC: 192 MG/DL — HIGH (ref 70–99)
GLUCOSE BLDC GLUCOMTR-MCNC: 230 MG/DL — HIGH (ref 70–99)
GLUCOSE SERPL-MCNC: 189 MG/DL — HIGH (ref 70–99)
HCT VFR BLD CALC: 30.9 % — LOW (ref 39–50)
HGB BLD-MCNC: 10.3 G/DL — LOW (ref 13–17)
MCHC RBC-ENTMCNC: 29.8 PG — SIGNIFICANT CHANGE UP (ref 27–34)
MCHC RBC-ENTMCNC: 33.3 GM/DL — SIGNIFICANT CHANGE UP (ref 32–36)
MCV RBC AUTO: 89.3 FL — SIGNIFICANT CHANGE UP (ref 80–100)
METHOD TYPE: SIGNIFICANT CHANGE UP
METHOD TYPE: SIGNIFICANT CHANGE UP
NRBC # BLD: 0 /100 WBCS — SIGNIFICANT CHANGE UP (ref 0–0)
PLATELET # BLD AUTO: 361 K/UL — SIGNIFICANT CHANGE UP (ref 150–400)
POTASSIUM SERPL-MCNC: 3.9 MMOL/L — SIGNIFICANT CHANGE UP (ref 3.5–5.3)
POTASSIUM SERPL-SCNC: 3.9 MMOL/L — SIGNIFICANT CHANGE UP (ref 3.5–5.3)
RBC # BLD: 3.46 M/UL — LOW (ref 4.2–5.8)
RBC # FLD: 12.2 % — SIGNIFICANT CHANGE UP (ref 10.3–14.5)
SODIUM SERPL-SCNC: 137 MMOL/L — SIGNIFICANT CHANGE UP (ref 135–145)
WBC # BLD: 10.73 K/UL — HIGH (ref 3.8–10.5)
WBC # FLD AUTO: 10.73 K/UL — HIGH (ref 3.8–10.5)

## 2021-10-22 PROCEDURE — 99233 SBSQ HOSP IP/OBS HIGH 50: CPT | Mod: GC

## 2021-10-22 RX ORDER — SACUBITRIL AND VALSARTAN 24; 26 MG/1; MG/1
1 TABLET, FILM COATED ORAL ONCE
Refills: 0 | Status: COMPLETED | OUTPATIENT
Start: 2021-10-22 | End: 2021-10-22

## 2021-10-22 RX ADMIN — Medication 3 MILLIGRAM(S): at 22:17

## 2021-10-22 RX ADMIN — OXYCODONE AND ACETAMINOPHEN 1 TABLET(S): 5; 325 TABLET ORAL at 09:34

## 2021-10-22 RX ADMIN — OXYCODONE AND ACETAMINOPHEN 1 TABLET(S): 5; 325 TABLET ORAL at 10:30

## 2021-10-22 RX ADMIN — OXYCODONE AND ACETAMINOPHEN 1 TABLET(S): 5; 325 TABLET ORAL at 18:29

## 2021-10-22 RX ADMIN — Medication 250 MILLIGRAM(S): at 18:27

## 2021-10-22 RX ADMIN — CEFEPIME 100 MILLIGRAM(S): 1 INJECTION, POWDER, FOR SOLUTION INTRAMUSCULAR; INTRAVENOUS at 17:56

## 2021-10-22 RX ADMIN — SACUBITRIL AND VALSARTAN 1 TABLET(S): 24; 26 TABLET, FILM COATED ORAL at 11:34

## 2021-10-22 RX ADMIN — Medication 250 MILLIGRAM(S): at 05:18

## 2021-10-22 RX ADMIN — CEFEPIME 100 MILLIGRAM(S): 1 INJECTION, POWDER, FOR SOLUTION INTRAMUSCULAR; INTRAVENOUS at 05:19

## 2021-10-22 RX ADMIN — OXYCODONE AND ACETAMINOPHEN 1 TABLET(S): 5; 325 TABLET ORAL at 17:56

## 2021-10-22 NOTE — PROGRESS NOTE ADULT - ASSESSMENT
A:  s/p 2nd and 3rd right toes amputation 10/19  Right foot infection  Cellulitis    P:  Pt seen and evaluated  s/p 2nd and 3rd right toes amputation - left opened with application of wound vac   Wound vac removed  Applied adaptic, wound vac to R foot surgical site  Dressed with 4x4s, ABD, paula, ACE  Recommend nonWB to right foot, however can partially weight bear on heel as tolerated   Planning on taking to OR Tuesday 10/26 for delayed primary closure at 2:00pm with Dr. Kitchen  Continue LWC   Continue abx per primary team  Will follow while in house  Discussed with attending Dr. Kitchen and seen bedside with Dr. Jacobo   A:  s/p 2nd and 3rd right toes amputation 10/20  Right foot infection  Cellulitis    P:  Pt seen and evaluated  s/p 2nd and 3rd right toes amputation - left opened with application of wound vac   Wound vac removed  Applied adaptic, wound vac to R foot surgical site  Dressed with 4x4s, ABD, paula, ACE  Recommend nonWB to right foot, however can partially weight bear on heel as tolerated   Planning on taking to OR Tuesday 10/26 for delayed primary closure at 2:00pm with Dr. Kitchen  Continue LWC   Continue abx per primary team  Will follow while in house  Discussed with attending Dr. Kitchen and seen bedside with Dr. Jacobo

## 2021-10-22 NOTE — PROGRESS NOTE ADULT - SUBJECTIVE AND OBJECTIVE BOX
PGY-1 Progress Note discussed with attending    PAGER #: [[phone]] TILL 5:00 PM  PLEASE CONTACT ON CALL TEAM:  - On Call Team (Please refer to Quintin) FROM 5:00 PM - 8:30PM  - Nightfloat Team FROM 8:30 -7:30 AM    CHIEF COMPLAINT & BRIEF HOSPITAL COURSE:  51 year old male with PMH CVA (10 years ago, no deficits), HTN, DM, CHF (EF 30-35% 2/21), presented with right foot swelling. Patient stated two weeks ago he noticed bleeding in his shoes and found a staple stuck to the bottom of his feet. He didn't notice it before because he states he doesn't have any sensation in his feet. He went to urgent care, where they prescribed him Keflex Q8hrs. Patient states the foot started to swell more so he returned to urgent care yesterday where they gave him a prescription of clindamycin. Patient this morning started to endorse chills, nausea, but denies any fever, vomiting, chest pain, SOB, abdominal pain, or changes in bowel habits.  Patient was admitted in February due to covid, and was found to have CHF and DM. Patient has not been complaint with any of his medications and states he just takes Lasix and Xanax. MRI 10/18 shows proximal OM of 2nd and 3rd digit of R. Foot. Angiogram 10/19 shows Inline flow to R foot via dominant PT and smaller peroneal with AT occlusion. Pt. had amputation of the 2nd and 3rd digit on the R. foot on 10/20.    INTERVAL HPI/OVERNIGHT EVENTS:   Pt. s/p 2nd and 3rd digit amputation of R. Foot POD#2. Pt. reports 8/10 dull pain in R. Foot. Pt. continues to be non-compliant with medications for DM, HTN, and CHF.     MEDICATIONS  (STANDING):  aspirin enteric coated 81 milliGRAM(s) Oral daily  atorvastatin 80 milliGRAM(s) Oral at bedtime  cefepime   IVPB 2000 milliGRAM(s) IV Intermittent every 12 hours  chlorhexidine 2% Cloths 1 Application(s) Topical <User Schedule>  influenza   Vaccine 0.5 milliLiter(s) IntraMuscular once  insulin lispro (ADMELOG) corrective regimen sliding scale   SubCutaneous Before meals and at bedtime  insulin lispro Injectable (ADMELOG) 2 Unit(s) SubCutaneous three times a day before meals  melatonin 3 milliGRAM(s) Oral at bedtime  sacubitril 24 mG/valsartan 26 mG 1 Tablet(s) Oral two times a day  vancomycin  IVPB 1000 milliGRAM(s) IV Intermittent every 12 hours    MEDICATIONS  (PRN):  ondansetron Injectable 4 milliGRAM(s) IV Push every 6 hours PRN Nausea and/or Vomiting  oxycodone    5 mG/acetaminophen 325 mG 1 Tablet(s) Oral every 6 hours PRN Moderate Pain (4 - 6)    REVIEW OF SYSTEMS:  CONSTITUTIONAL: No fever, chills, weight loss, or fatigue  RESPIRATORY: No dry cough, Denies wheezing, chills or hemoptysis; No shortness of breath  CARDIOVASCULAR: No chest pain, palpitations, dizziness, or leg swelling  GASTROINTESTINAL: No abdominal pain. No vomiting, or hematemesis; No diarrhea or constipation. No melena or hematochezia.  GENITOURINARY: No dysuria or hematuria, urinary frequency  NEUROLOGICAL: No headaches, memory loss, loss of strength  SKIN: Slightly bruising and and discoloration on R anterior leg    Vital Signs Last 24 Hrs  T(C): 36.7 (22 Oct 2021 05:29), Max: 37.8 (21 Oct 2021 20:20)  T(F): 98 (22 Oct 2021 05:29), Max: 100 (21 Oct 2021 20:20)  HR: 92 (22 Oct 2021 05:29) (81 - 94)  BP: 103/72 (22 Oct 2021 05:29) (103/72 - 118/74)  BP(mean): 82 (21 Oct 2021 13:50) (82 - 82)  RR: 19 (22 Oct 2021 05:29) (16 - 19)  SpO2: 99% (22 Oct 2021 05:29) (99% - 100%)    PHYSICAL EXAMINATION:  GENERAL: NAD, well built  HEAD:  Atraumatic, Normocephalic  EYES:  conjunctiva and sclera clear  NECK: Supple, No JVD, Normal thyroid  CHEST/LUNG: no crackles in b/l lung bases. No rales, rhonchi, wheezing, or rubs  HEART: Regular rate and rhythm; No murmurs, rubs, or gallops  ABDOMEN: Soft, Nontender, Nondistended; Bowel sounds present  NERVOUS SYSTEM:  Alert & Oriented X3  EXTREMITIES:  1+ pitting edema b/l 2+ Peripheral Pulses, No clubbing, cyanosis  SKIN: s/p R. Foot 2nd and 3rd digit amputation, wound dressed. s/p Angiogram bruising seen on anterior leg                                10.3   10.73 )-----------( 361      ( 22 Oct 2021 07:55 )             30.9       137  |  101  |  12  ----------------------------<  189<H>  3.9   |  28  |  1.04    Ca    9.1      22 Oct 2021 07:55    TPro  6.9  /  Alb  2.4<L>  /  TBili  0.4  /  DBili  x   /  AST  10  /  ALT  22  /  AlkPhos  66  10-21      CAPILLARY BLOOD GLUCOSE    POCT Blood Glucose.: 163 mg/dL (22 Oct 2021 11:32)  POCT Blood Glucose.: 192 mg/dL (22 Oct 2021 08:05)  POCT Blood Glucose.: 210 mg/dL (21 Oct 2021 21:31)  POCT Blood Glucose.: 192 mg/dL (21 Oct 2021 16:24)      RADIOLOGY & ADDITIONAL TESTS:    EXAM:  US PHYSIOL LWR EXT 3+ LEV BI                        PROCEDURE DATE:  10/16/2021    INTERPRETATION:  Clinical indication: Ischemic toe  COMPARISON: None  FINDINGS: There are pulsatile waveforms bilaterally. Segmental pressures could not be obtained due to calcified, noncompressible vessels. Therefore, ABIs could not be calculated.  IMPRESSION: ABIs could not be calculated due to calcified, noncompressible vessels.    ------------------------------------------------------------------------    EXAM:  XR FOOT 2 VIEWS RT                        PROCEDURE DATE:  10/15/2021    INTERPRETATION:  Cellulitis swollen right foot.  3 views right foot.  No fracture or dislocation. No focal bone lysis or unusual periosteal reaction. Joint spaces preserved. Small calcaneal osteophytes. Vascular calcification. Soft tissue swelling mostly over the dorsum of the foot may reflect cellulitis. No soft tissue gas.  IMPRESSION: No acute or destructive osseous pathology.  If there is clinical suspicion of osteomyelitis consider bone scan or MRI    ------------------------------------------------------------------------    EXAM:  MR FOOT RT                        PROCEDURE DATE:  10/18/2021    INTERPRETATION:  EXAMINATION: MR FOOT RIGHT  CLINICAL INDICATION: Evaluate for osteomyelitis  COMPARISON: Radiographs dated 10/15/2021  TECHNIQUE: Multiplanar, multi-sequence MRI of the right foot was performed without intravenous contrast.  INTERPRETATION:  Bones and soft tissues: There is no fracture. There is patchy STIR hyperintensity within the second digit proximal phalanx with mild patchy T1 hypointensity seen on the short axis TI images. Similar finding is seen at the base of the third digit proximal phalanx. The findings are favored to represent early osteomyelitis, with reactive osteitis considered less likely.  There is extensive surrounding soft tissue edema and phlegmon at the second and third digits which may represent abscess-in -formation with blistering of the overlying skin. Multiple foci of hypointensity seen on series 6 and 7 at the second and third digits may be related to vascular atherosclerosis versus foci of soft tissue gas. Advise radiographic correlation (prior radiographs are dated 10/15/2021).  Muscles: There is extensive edema within the intrinsic muscles of the foot which may be seen in neuropathy.  IMPRESSION:  1.  Findings favored to represent osteomyelitis involving second and third digit proximal phalanges.  2.  Extensive overlying soft tissue infection involving the second and third digits with surrounding edema and phlegmon, may represent abscess-in-formation. Additionally, multiple rounded foci of hypointensity in soft tissue at the second and third digits may be related to vascular atherosclerosis versus foci of soft tissue gas. Advise radiographic correlation (prior radiographs are dated 10/15/2021).    ------------------------------------------------------------------------    PROCEDURE: Transthoracic echocardiogram with 2-D, M-Mode  and complete spectral and color flow Doppler.  Study Date: 10/19/2021  INDICATION: Cardiomyopathy, unspecified (I42.9)  HISTORY:    DIMENSIONS:  Dimensions:     Normal Values:  LA:     4.9 cm    2.0 - 4.0 cm  Ao:     3.9 cm    2.0 - 3.8 cm  SEPTUM: 0.9 cm    0.6 - 1.2 cm  PWT:    1.0 cm    0.6 - 1.1 cm  LVIDd:  5.9 cm    3.0 - 5.6 cm  LVIDs:  5.9 cm    1.8 - 4.0 cm    Derived Variables:  LVMI: 101 g/m2  RWT: 0.33  Ejection Fraction Visual Estimate: 30-35 %  Ejection Fraction Stevens: 33 %    OBSERVATIONS:  Mitral Valve: Normal mitral valve. Mild mitral  regurgitation.  Aortic Root: Aortic Root: 3.9 cm.    Aortic Valve: Calcified trileaflet aortic valve with normal  opening. Mild aortic regurgitation.  Left Atrium: Normal left atrium.  LA volume index = 31  cc/m2.  Left Ventricle: Dilated lv with severe segmental left  ventricular systolic dysfunction.The distal  septum,apex,inferiorand infero-lateral walls are  hypokinetic EF=30-35%. Normal left ventricular internal  dimensions and wall thicknesses. Grade I diastolic  dysfunction (Impaired relaxation, mild).  Right Heart: Normal right atrium. Normal right ventricular  size and systolic function (TAPSE  1.7cm). There is mild  tricuspid regurgitation. Normal pulmonic valve.  Pericardium/PleuraNormal pericardium with no pericardial  effusion.  Hemodynamic: RV systolic pressure is severely increased at  65 mm Hg.    CONCLUSIONS:  1. Normal mitral valve. Mild mitral regurgitation.  2. Calcified trileaflet aortic valve with normal opening.  Mild aortic regurgitation.  3. Aortic Root: 3.9 cm.    4. Normal left atrium.  LA volume index = 31 cc/m2.  5. Normal left ventricular internal dimensions and wall  thicknesses.  6. Dilated lv with severe segmental left ventricular  systolic dysfunction.The distal septum,apex,inferiorand  infero-lateral walls are hypokinetic EF=30-35%.  7. Grade I diastolic dysfunction (Impaired relaxation,  mild).  8. Normal right atrium.  9. Normal right ventricular size and systolic function  (TAPSE  1.7cm).  10. RV systolic pressure is severely increased at  65 mm  Hg.  11. There is mild tricuspid regurgitation.  12. Normal pulmonic valve.  13. Normal pericardium with no pericardial effusion.    ------------------------------------------------------------------------    EXAM:  XR FOOT COMP MIN 3 VIEWS RT                        PROCEDURE DATE:  10/20/2021    INTERPRETATION:  Right foot  HISTORY: Postop  COMPARISON: 10/15/2021   Two views of the right foot show amputation of the second and third toes. Wound VAC device is present. The joint spaces are maintained.  IMPRESSION: Postoperative changes.           PGY-1 Progress Note discussed with attending    PAGER #: [[phone]] TILL 5:00 PM  PLEASE CONTACT ON CALL TEAM:  - On Call Team (Please refer to Quintin) FROM 5:00 PM - 8:30PM  - Nightfloat Team FROM 8:30 -7:30 AM    CHIEF COMPLAINT & BRIEF HOSPITAL COURSE:  51 year old male with PMH CVA (10 years ago, no deficits), HTN, DM, CHF (EF 30-35% 2/21), presented with right foot swelling. Patient stated two weeks ago he noticed bleeding in his shoes and found a staple stuck to the bottom of his feet. He didn't notice it before because he states he doesn't have any sensation in his feet. He went to urgent care, where they prescribed him Keflex Q8hrs. Patient states the foot started to swell more so he returned to urgent care yesterday where they gave him a prescription of clindamycin. Patient this morning started to endorse chills, nausea, but denies any fever, vomiting, chest pain, SOB, abdominal pain, or changes in bowel habits.  Patient was admitted in February due to covid, and was found to have CHF and DM. Patient has not been complaint with any of his medications and states he just takes Lasix and Xanax. MRI 10/18 shows proximal OM of 2nd and 3rd digit of R. Foot. Angiogram 10/19 shows Inline flow to R foot via dominant PT and smaller peroneal with AT occlusion. Pt. had amputation of the 2nd and 3rd digit on the R. foot on 10/20.    INTERVAL HPI/OVERNIGHT EVENTS:   Pt. s/p 2nd and 3rd digit amputation of R. Foot POD#2. Pt. reports 8/10 dull pain in R. Foot. Pt. agreed to take 1 dose of entresto today    MEDICATIONS  (STANDING):  aspirin enteric coated 81 milliGRAM(s) Oral daily  atorvastatin 80 milliGRAM(s) Oral at bedtime  cefepime   IVPB 2000 milliGRAM(s) IV Intermittent every 12 hours  chlorhexidine 2% Cloths 1 Application(s) Topical <User Schedule>  influenza   Vaccine 0.5 milliLiter(s) IntraMuscular once  insulin lispro (ADMELOG) corrective regimen sliding scale   SubCutaneous Before meals and at bedtime  insulin lispro Injectable (ADMELOG) 2 Unit(s) SubCutaneous three times a day before meals  melatonin 3 milliGRAM(s) Oral at bedtime  sacubitril 24 mG/valsartan 26 mG 1 Tablet(s) Oral two times a day  vancomycin  IVPB 1000 milliGRAM(s) IV Intermittent every 12 hours    MEDICATIONS  (PRN):  ondansetron Injectable 4 milliGRAM(s) IV Push every 6 hours PRN Nausea and/or Vomiting  oxycodone    5 mG/acetaminophen 325 mG 1 Tablet(s) Oral every 6 hours PRN Moderate Pain (4 - 6)    REVIEW OF SYSTEMS:  CONSTITUTIONAL: No fever, chills, weight loss, or fatigue  RESPIRATORY: No dry cough, Denies wheezing, chills or hemoptysis; No shortness of breath  CARDIOVASCULAR: No chest pain, palpitations, dizziness, or leg swelling  GASTROINTESTINAL: No abdominal pain. No vomiting, or hematemesis; No diarrhea or constipation. No melena or hematochezia.  GENITOURINARY: No dysuria or hematuria, urinary frequency  NEUROLOGICAL: No headaches, memory loss, loss of strength  SKIN: Slightly bruising and and discoloration on R anterior leg    Vital Signs Last 24 Hrs  T(C): 36.7 (22 Oct 2021 05:29), Max: 37.8 (21 Oct 2021 20:20)  T(F): 98 (22 Oct 2021 05:29), Max: 100 (21 Oct 2021 20:20)  HR: 92 (22 Oct 2021 05:29) (81 - 94)  BP: 103/72 (22 Oct 2021 05:29) (103/72 - 118/74)  BP(mean): 82 (21 Oct 2021 13:50) (82 - 82)  RR: 19 (22 Oct 2021 05:29) (16 - 19)  SpO2: 99% (22 Oct 2021 05:29) (99% - 100%)    PHYSICAL EXAMINATION:  GENERAL: NAD, well built  HEAD:  Atraumatic, Normocephalic  EYES:  conjunctiva and sclera clear  NECK: Supple, No JVD, Normal thyroid  CHEST/LUNG: no crackles in b/l lung bases. No rales, rhonchi, wheezing, or rubs  HEART: Regular rate and rhythm; No murmurs, rubs, or gallops  ABDOMEN: Soft, Nontender, Nondistended; Bowel sounds present  NERVOUS SYSTEM:  Alert & Oriented X3  EXTREMITIES:  1+ pitting edema b/l 2+ Peripheral Pulses, No clubbing, cyanosis  SKIN: s/p R. Foot 2nd and 3rd digit amputation, wound dressed.                                 10.3   10.73 )-----------( 361      ( 22 Oct 2021 07:55 )             30.9       137  |  101  |  12  ----------------------------<  189<H>  3.9   |  28  |  1.04    Ca    9.1      22 Oct 2021 07:55    TPro  6.9  /  Alb  2.4<L>  /  TBili  0.4  /  DBili  x   /  AST  10  /  ALT  22  /  AlkPhos  66  10-21      CAPILLARY BLOOD GLUCOSE    POCT Blood Glucose.: 163 mg/dL (22 Oct 2021 11:32)  POCT Blood Glucose.: 192 mg/dL (22 Oct 2021 08:05)  POCT Blood Glucose.: 210 mg/dL (21 Oct 2021 21:31)  POCT Blood Glucose.: 192 mg/dL (21 Oct 2021 16:24)      RADIOLOGY & ADDITIONAL TESTS:    EXAM:  US PHYSIOL LWR EXT 3+ LEV BI                        PROCEDURE DATE:  10/16/2021    INTERPRETATION:  Clinical indication: Ischemic toe  COMPARISON: None  FINDINGS: There are pulsatile waveforms bilaterally. Segmental pressures could not be obtained due to calcified, noncompressible vessels. Therefore, ABIs could not be calculated.  IMPRESSION: ABIs could not be calculated due to calcified, noncompressible vessels.    ------------------------------------------------------------------------    EXAM:  XR FOOT 2 VIEWS RT                        PROCEDURE DATE:  10/15/2021    INTERPRETATION:  Cellulitis swollen right foot.  3 views right foot.  No fracture or dislocation. No focal bone lysis or unusual periosteal reaction. Joint spaces preserved. Small calcaneal osteophytes. Vascular calcification. Soft tissue swelling mostly over the dorsum of the foot may reflect cellulitis. No soft tissue gas.  IMPRESSION: No acute or destructive osseous pathology.  If there is clinical suspicion of osteomyelitis consider bone scan or MRI    ------------------------------------------------------------------------    EXAM:  MR FOOT RT                        PROCEDURE DATE:  10/18/2021    INTERPRETATION:  EXAMINATION: MR FOOT RIGHT  CLINICAL INDICATION: Evaluate for osteomyelitis  COMPARISON: Radiographs dated 10/15/2021  TECHNIQUE: Multiplanar, multi-sequence MRI of the right foot was performed without intravenous contrast.  INTERPRETATION:  Bones and soft tissues: There is no fracture. There is patchy STIR hyperintensity within the second digit proximal phalanx with mild patchy T1 hypointensity seen on the short axis TI images. Similar finding is seen at the base of the third digit proximal phalanx. The findings are favored to represent early osteomyelitis, with reactive osteitis considered less likely.  There is extensive surrounding soft tissue edema and phlegmon at the second and third digits which may represent abscess-in -formation with blistering of the overlying skin. Multiple foci of hypointensity seen on series 6 and 7 at the second and third digits may be related to vascular atherosclerosis versus foci of soft tissue gas. Advise radiographic correlation (prior radiographs are dated 10/15/2021).  Muscles: There is extensive edema within the intrinsic muscles of the foot which may be seen in neuropathy.  IMPRESSION:  1.  Findings favored to represent osteomyelitis involving second and third digit proximal phalanges.  2.  Extensive overlying soft tissue infection involving the second and third digits with surrounding edema and phlegmon, may represent abscess-in-formation. Additionally, multiple rounded foci of hypointensity in soft tissue at the second and third digits may be related to vascular atherosclerosis versus foci of soft tissue gas. Advise radiographic correlation (prior radiographs are dated 10/15/2021).    ------------------------------------------------------------------------    PROCEDURE: Transthoracic echocardiogram with 2-D, M-Mode  and complete spectral and color flow Doppler.  Study Date: 10/19/2021  INDICATION: Cardiomyopathy, unspecified (I42.9)  HISTORY:    DIMENSIONS:  Dimensions:     Normal Values:  LA:     4.9 cm    2.0 - 4.0 cm  Ao:     3.9 cm    2.0 - 3.8 cm  SEPTUM: 0.9 cm    0.6 - 1.2 cm  PWT:    1.0 cm    0.6 - 1.1 cm  LVIDd:  5.9 cm    3.0 - 5.6 cm  LVIDs:  5.9 cm    1.8 - 4.0 cm    Derived Variables:  LVMI: 101 g/m2  RWT: 0.33  Ejection Fraction Visual Estimate: 30-35 %  Ejection Fraction Stevens: 33 %    OBSERVATIONS:  Mitral Valve: Normal mitral valve. Mild mitral  regurgitation.  Aortic Root: Aortic Root: 3.9 cm.    Aortic Valve: Calcified trileaflet aortic valve with normal  opening. Mild aortic regurgitation.  Left Atrium: Normal left atrium.  LA volume index = 31  cc/m2.  Left Ventricle: Dilated lv with severe segmental left  ventricular systolic dysfunction.The distal  septum,apex,inferiorand infero-lateral walls are  hypokinetic EF=30-35%. Normal left ventricular internal  dimensions and wall thicknesses. Grade I diastolic  dysfunction (Impaired relaxation, mild).  Right Heart: Normal right atrium. Normal right ventricular  size and systolic function (TAPSE  1.7cm). There is mild  tricuspid regurgitation. Normal pulmonic valve.  Pericardium/PleuraNormal pericardium with no pericardial  effusion.  Hemodynamic: RV systolic pressure is severely increased at  65 mm Hg.    CONCLUSIONS:  1. Normal mitral valve. Mild mitral regurgitation.  2. Calcified trileaflet aortic valve with normal opening.  Mild aortic regurgitation.  3. Aortic Root: 3.9 cm.    4. Normal left atrium.  LA volume index = 31 cc/m2.  5. Normal left ventricular internal dimensions and wall  thicknesses.  6. Dilated lv with severe segmental left ventricular  systolic dysfunction.The distal septum,apex,inferiorand  infero-lateral walls are hypokinetic EF=30-35%.  7. Grade I diastolic dysfunction (Impaired relaxation,  mild).  8. Normal right atrium.  9. Normal right ventricular size and systolic function  (TAPSE  1.7cm).  10. RV systolic pressure is severely increased at  65 mm  Hg.  11. There is mild tricuspid regurgitation.  12. Normal pulmonic valve.  13. Normal pericardium with no pericardial effusion.    ------------------------------------------------------------------------    EXAM:  XR FOOT COMP MIN 3 VIEWS RT                        PROCEDURE DATE:  10/20/2021    INTERPRETATION:  Right foot  HISTORY: Postop  COMPARISON: 10/15/2021   Two views of the right foot show amputation of the second and third toes. Wound VAC device is present. The joint spaces are maintained.  IMPRESSION: Postoperative changes.

## 2021-10-22 NOTE — PROGRESS NOTE ADULT - SUBJECTIVE AND OBJECTIVE BOX
Podiatry Interval HPI: Pt seen resting in bed s/p 2nd and 3rd toe amputation Pt states that there is still mild pain to surgical site. Pt denies any acute overnight events. Pt denies any constitutional symptoms of N/V/C/F/Sob.     Podiatry HPI: Podiatry consulted regarding a 52 yo male who presents to the ED for a right foot infection. Pt states that he stepped on a brass staple about 2 weeks ago and had no idea it punctured through the skin until the next morning when he started to feel pain. Pt states that he is diabetic so his sensation in his feet is diminished. Pt reports that he went to the urgent care and was prescribed keflex which he finished. He was also told by the urgent care to soak the foot in epsom salt and apply bacitracin cream on it. Pt reports that soaking it made it worse and turned into a infection a few days ago. Pt is complaining 6/10 pain on the right foot. Admits to having chills. Pt denies constitutional symptoms of N/V/C/F/Sob.     HPI: Patient is a 50 y/o male who presents with worsening right foot pain and wound x2 weeks for which he was prescribed keflex course which he completed and a clindamycin course which he just started. Patient has PMH significant for DM, CHF. Patient reports that a "contractor grade staple" punctured his foot and he didn't realize until the next morning when he couldn't bear weight. He reports he is up to date on his tetanus shot. He reports chills but denies fever as he "did not check it" and ascending leg pain at times. Denies shortness of breath, chest pain or pressure, nausea or vomiting.      Medications aspirin enteric coated 81 milliGRAM(s) Oral daily  atorvastatin 80 milliGRAM(s) Oral at bedtime  cefepime   IVPB 2000 milliGRAM(s) IV Intermittent every 12 hours  chlorhexidine 2% Cloths 1 Application(s) Topical <User Schedule>  influenza   Vaccine 0.5 milliLiter(s) IntraMuscular once  insulin lispro (ADMELOG) corrective regimen sliding scale   SubCutaneous Before meals and at bedtime  insulin lispro Injectable (ADMELOG) 2 Unit(s) SubCutaneous three times a day before meals  melatonin 3 milliGRAM(s) Oral at bedtime  ondansetron Injectable 4 milliGRAM(s) IV Push every 6 hours PRN  oxycodone    5 mG/acetaminophen 325 mG 1 Tablet(s) Oral every 6 hours PRN  sacubitril 24 mG/valsartan 26 mG 1 Tablet(s) Oral two times a day  vancomycin  IVPB 1000 milliGRAM(s) IV Intermittent every 12 hours    FHNo pertinent family history    ,   PMHCVA (cerebral vascular accident)    HTN (hypertension)    Anxiety    DM (diabetes mellitus)    Chronic CHF    Chronic systolic congestive heart failure       PSHNo pertinent past surgical history        Labs                          10.3   10.73 )-----------( 361      ( 22 Oct 2021 07:55 )             30.9      10-22    137  |  101  |  12  ----------------------------<  189<H>  3.9   |  28  |  1.04    Ca    9.1      22 Oct 2021 07:55    TPro  6.9  /  Alb  2.4<L>  /  TBili  0.4  /  DBili  x   /  AST  10  /  ALT  22  /  AlkPhos  66  10-21     Vital Signs Last 24 Hrs  T(C): 36.7 (22 Oct 2021 05:29), Max: 37.8 (21 Oct 2021 20:20)  T(F): 98 (22 Oct 2021 05:29), Max: 100 (21 Oct 2021 20:20)  HR: 92 (22 Oct 2021 05:29) (81 - 94)  BP: 103/72 (22 Oct 2021 05:29) (103/72 - 118/74)  BP(mean): 82 (21 Oct 2021 13:50) (82 - 82)  RR: 19 (22 Oct 2021 05:29) (16 - 19)  SpO2: 99% (22 Oct 2021 05:29) (99% - 100%)  Sedimentation Rate, Erythrocyte: 98 mm/Hr (10-21-21 @ 04:40)  Sedimentation Rate, Erythrocyte: 99 mm/Hr (10-15-21 @ 13:37)         C-Reactive Protein, Serum: 42 mg/L (10-21-21 @ 14:41)  C-Reactive Protein, Serum: 106 mg/L (10-16-21 @ 01:09)   WBC Count: 10.73 K/uL *H* (10-22-21 @ 07:55)       PHYSICAL EXAM  GEN: CLARIBEL REICH is a pleasant well-nourished, well developed 51y Male in no acute distress, alert awake, and oriented to person, place and time.   LE Focused:    Vasc: DP/PT pulses faintly palpable, CFT < 5 seconds to digits, TG warm to cool, pitting edema present on bilateral legs  Derm: Surgical site R 2nd and 3rd digit amputation left open with R 2nd & 3rd metatarsal heads visible. No purlent drainage noted. No streaking or erythema noted. No clinical signs of infection noted to R surgical site.  Neuro: Protective sensation grossly diminished  MSK: Able to wiggles toes. R 2nd & 3rd digit amputation     10/15: s/p R foot bedside I&D: tracking noted in all directions, probes to bone, serosanguinous drainage noted       Imaging:    EXAM:  MR FOOT RT                          PROCEDURE DATE:  10/18/2021      INTERPRETATION:  EXAMINATION: MR FOOT RIGHT    CLINICAL INDICATION:Evaluate for osteomyelitis    COMPARISON: Radiographs dated 10/15/2021    TECHNIQUE: Multiplanar, multi-sequence MRI of the right foot was performed without intravenous contrast.    INTERPRETATION:    Bones and soft tissues: There is no fracture. There is patchy STIR hyperintensity within the second digit proximal phalanx with mild patchy T1 hypointensity seen on the short axis TI images. Similar finding is seen at the base of the third digit proximal phalanx. The findings are favored to represent early osteomyelitis, with reactive osteitis considered less likely.    There is extensive surrounding soft tissue edema and phlegmon at the second and third digits which may represent abscess-in -formation with blistering of the overlying skin. Multiple foci of hypointensity seen on series 6 and 7 at the second and third digits may be related to vascular atherosclerosis versus foci of soft tissue gas. Advise radiographic correlation (prior radiographs are dated 10/15/2021).    Muscles: There is extensive edema within the intrinsic muscles of the foot which may be seen in neuropathy.    IMPRESSION:  1.  Findings favored to represent osteomyelitis involving second and third digit proximal phalanges.    2.  Extensive overlying soft tissue infection involving the second and third digits with surrounding edema and phlegmon, may represent abscess-in-formation. Additionally, multiple rounded foci of hypointensity in soft tissue at the second and third digits may be related to vascular atherosclerosis versus foci of soft tissue gas. Advise radiographic correlation (prior radiographs are dated 10/15/2021).      EXAM:  XR FOOT 2 VIEWS RT                          PROCEDURE DATE:  10/15/2021        INTERPRETATION:  Cellulitis swollen right foot.    3 views right foot.    No fracture or dislocation. No focal bone lysis or unusual periosteal reaction. Joint spaces preserved. Small calcaneal osteophytes. Vascular calcification. Soft tissue swelling mostly over the dorsum of the foot may reflect cellulitis. No soft tissue gas.    IMPRESSION: No acute or destructive osseous pathology.    If there is clinical suspicion of osteomyelitis consider bone scan or MRI        EXAM:  US PHYSIOL LWR EXT 3+ LEV BI                          PROCEDURE DATE:  10/16/2021      INTERPRETATION:  Clinical indication: Ischemic toe    COMPARISON: None    FINDINGS: There are pulsatile waveforms bilaterally. Segmental pressures could not be obtained due to calcified, noncompressible vessels. Therefore, ABIs could not be calculated.    IMPRESSION: ABIs could not be calculated due to calcified, noncompressible vessels.      Culture  Specimen Source: .Surgical Swab R foot plantar wound (10.16.21 @ 02:28) Culture Results:   Few Streptococcus agalactiae (Group B) isolated   Group B streptococci are susceptible to ampicillin,   penicillin and cefazolin, but may be resistant to   erythromycin and clindamycin.   Recommendations for intrapartum prophylaxis for Group B   streptococci are penicillin or ampicillin. (10.16.21 @ 02:28)                    Podiatry Interval HPI: Pt seen resting in bed s/p 2nd and 3rd toe amputation 10/19. Pt states that there is still mild pain to surgical site. Pt denies any acute overnight events. Pt denies any constitutional symptoms of N/V/C/F/Sob.     Podiatry HPI: Podiatry consulted regarding a 50 yo male who presents to the ED for a right foot infection. Pt states that he stepped on a brass staple about 2 weeks ago and had no idea it punctured through the skin until the next morning when he started to feel pain. Pt states that he is diabetic so his sensation in his feet is diminished. Pt reports that he went to the urgent care and was prescribed keflex which he finished. He was also told by the urgent care to soak the foot in epsom salt and apply bacitracin cream on it. Pt reports that soaking it made it worse and turned into a infection a few days ago. Pt is complaining 6/10 pain on the right foot. Admits to having chills. Pt denies constitutional symptoms of N/V/C/F/Sob.     HPI: Patient is a 52 y/o male who presents with worsening right foot pain and wound x2 weeks for which he was prescribed keflex course which he completed and a clindamycin course which he just started. Patient has PMH significant for DM, CHF. Patient reports that a "contractor grade staple" punctured his foot and he didn't realize until the next morning when he couldn't bear weight. He reports he is up to date on his tetanus shot. He reports chills but denies fever as he "did not check it" and ascending leg pain at times. Denies shortness of breath, chest pain or pressure, nausea or vomiting.      Medications aspirin enteric coated 81 milliGRAM(s) Oral daily  atorvastatin 80 milliGRAM(s) Oral at bedtime  cefepime   IVPB 2000 milliGRAM(s) IV Intermittent every 12 hours  chlorhexidine 2% Cloths 1 Application(s) Topical <User Schedule>  influenza   Vaccine 0.5 milliLiter(s) IntraMuscular once  insulin lispro (ADMELOG) corrective regimen sliding scale   SubCutaneous Before meals and at bedtime  insulin lispro Injectable (ADMELOG) 2 Unit(s) SubCutaneous three times a day before meals  melatonin 3 milliGRAM(s) Oral at bedtime  ondansetron Injectable 4 milliGRAM(s) IV Push every 6 hours PRN  oxycodone    5 mG/acetaminophen 325 mG 1 Tablet(s) Oral every 6 hours PRN  sacubitril 24 mG/valsartan 26 mG 1 Tablet(s) Oral two times a day  vancomycin  IVPB 1000 milliGRAM(s) IV Intermittent every 12 hours    FHNo pertinent family history    ,   PMHCVA (cerebral vascular accident)    HTN (hypertension)    Anxiety    DM (diabetes mellitus)    Chronic CHF    Chronic systolic congestive heart failure       PSHNo pertinent past surgical history        Labs                          10.3   10.73 )-----------( 361      ( 22 Oct 2021 07:55 )             30.9      10-22    137  |  101  |  12  ----------------------------<  189<H>  3.9   |  28  |  1.04    Ca    9.1      22 Oct 2021 07:55    TPro  6.9  /  Alb  2.4<L>  /  TBili  0.4  /  DBili  x   /  AST  10  /  ALT  22  /  AlkPhos  66  10-21     Vital Signs Last 24 Hrs  T(C): 36.7 (22 Oct 2021 05:29), Max: 37.8 (21 Oct 2021 20:20)  T(F): 98 (22 Oct 2021 05:29), Max: 100 (21 Oct 2021 20:20)  HR: 92 (22 Oct 2021 05:29) (81 - 94)  BP: 103/72 (22 Oct 2021 05:29) (103/72 - 118/74)  BP(mean): 82 (21 Oct 2021 13:50) (82 - 82)  RR: 19 (22 Oct 2021 05:29) (16 - 19)  SpO2: 99% (22 Oct 2021 05:29) (99% - 100%)  Sedimentation Rate, Erythrocyte: 98 mm/Hr (10-21-21 @ 04:40)  Sedimentation Rate, Erythrocyte: 99 mm/Hr (10-15-21 @ 13:37)         C-Reactive Protein, Serum: 42 mg/L (10-21-21 @ 14:41)  C-Reactive Protein, Serum: 106 mg/L (10-16-21 @ 01:09)   WBC Count: 10.73 K/uL *H* (10-22-21 @ 07:55)       PHYSICAL EXAM  GEN: CLARIBEL REICH is a pleasant well-nourished, well developed 51y Male in no acute distress, alert awake, and oriented to person, place and time.   LE Focused:    Vasc: DP/PT pulses faintly palpable, CFT < 5 seconds to digits, TG warm to cool, pitting edema present on bilateral legs  Derm: Surgical site R 2nd and 3rd digit amputation left open with R 2nd & 3rd metatarsal heads visible. No purlent drainage noted. No streaking or erythema noted. No clinical signs of infection noted to R surgical site.  Neuro: Protective sensation grossly diminished  MSK: Able to wiggles toes. R 2nd & 3rd digit amputation     10/15: s/p R foot bedside I&D: tracking noted in all directions, probes to bone, serosanguinous drainage noted       Imaging:    EXAM:  MR FOOT RT                          PROCEDURE DATE:  10/18/2021      INTERPRETATION:  EXAMINATION: MR FOOT RIGHT    CLINICAL INDICATION:Evaluate for osteomyelitis    COMPARISON: Radiographs dated 10/15/2021    TECHNIQUE: Multiplanar, multi-sequence MRI of the right foot was performed without intravenous contrast.    INTERPRETATION:    Bones and soft tissues: There is no fracture. There is patchy STIR hyperintensity within the second digit proximal phalanx with mild patchy T1 hypointensity seen on the short axis TI images. Similar finding is seen at the base of the third digit proximal phalanx. The findings are favored to represent early osteomyelitis, with reactive osteitis considered less likely.    There is extensive surrounding soft tissue edema and phlegmon at the second and third digits which may represent abscess-in -formation with blistering of the overlying skin. Multiple foci of hypointensity seen on series 6 and 7 at the second and third digits may be related to vascular atherosclerosis versus foci of soft tissue gas. Advise radiographic correlation (prior radiographs are dated 10/15/2021).    Muscles: There is extensive edema within the intrinsic muscles of the foot which may be seen in neuropathy.    IMPRESSION:  1.  Findings favored to represent osteomyelitis involving second and third digit proximal phalanges.    2.  Extensive overlying soft tissue infection involving the second and third digits with surrounding edema and phlegmon, may represent abscess-in-formation. Additionally, multiple rounded foci of hypointensity in soft tissue at the second and third digits may be related to vascular atherosclerosis versus foci of soft tissue gas. Advise radiographic correlation (prior radiographs are dated 10/15/2021).      EXAM:  XR FOOT 2 VIEWS RT                          PROCEDURE DATE:  10/15/2021        INTERPRETATION:  Cellulitis swollen right foot.    3 views right foot.    No fracture or dislocation. No focal bone lysis or unusual periosteal reaction. Joint spaces preserved. Small calcaneal osteophytes. Vascular calcification. Soft tissue swelling mostly over the dorsum of the foot may reflect cellulitis. No soft tissue gas.    IMPRESSION: No acute or destructive osseous pathology.    If there is clinical suspicion of osteomyelitis consider bone scan or MRI        EXAM:  US PHYSIOL LWR EXT 3+ LEV BI                          PROCEDURE DATE:  10/16/2021      INTERPRETATION:  Clinical indication: Ischemic toe    COMPARISON: None    FINDINGS: There are pulsatile waveforms bilaterally. Segmental pressures could not be obtained due to calcified, noncompressible vessels. Therefore, ABIs could not be calculated.    IMPRESSION: ABIs could not be calculated due to calcified, noncompressible vessels.      Culture  Specimen Source: .Surgical Swab R foot plantar wound (10.16.21 @ 02:28) Culture Results:   Few Streptococcus agalactiae (Group B) isolated   Group B streptococci are susceptible to ampicillin,   penicillin and cefazolin, but may be resistant to   erythromycin and clindamycin.   Recommendations for intrapartum prophylaxis for Group B   streptococci are penicillin or ampicillin. (10.16.21 @ 02:28)                    Podiatry Interval HPI: Pt seen resting in bed s/p 2nd and 3rd toe amputation R foot 10/20. Pt states that there is still mild pain to surgical site. Pt denies any acute overnight events. Pt denies any constitutional symptoms of N/V/C/F/Sob.     Podiatry HPI: Podiatry consulted regarding a 50 yo male who presents to the ED for a right foot infection. Pt states that he stepped on a brass staple about 2 weeks ago and had no idea it punctured through the skin until the next morning when he started to feel pain. Pt states that he is diabetic so his sensation in his feet is diminished. Pt reports that he went to the urgent care and was prescribed keflex which he finished. He was also told by the urgent care to soak the foot in epsom salt and apply bacitracin cream on it. Pt reports that soaking it made it worse and turned into a infection a few days ago. Pt is complaining 6/10 pain on the right foot. Admits to having chills. Pt denies constitutional symptoms of N/V/C/F/Sob.     HPI: Patient is a 50 y/o male who presents with worsening right foot pain and wound x2 weeks for which he was prescribed keflex course which he completed and a clindamycin course which he just started. Patient has PMH significant for DM, CHF. Patient reports that a "contractor grade staple" punctured his foot and he didn't realize until the next morning when he couldn't bear weight. He reports he is up to date on his tetanus shot. He reports chills but denies fever as he "did not check it" and ascending leg pain at times. Denies shortness of breath, chest pain or pressure, nausea or vomiting.      Medications aspirin enteric coated 81 milliGRAM(s) Oral daily  atorvastatin 80 milliGRAM(s) Oral at bedtime  cefepime   IVPB 2000 milliGRAM(s) IV Intermittent every 12 hours  chlorhexidine 2% Cloths 1 Application(s) Topical <User Schedule>  influenza   Vaccine 0.5 milliLiter(s) IntraMuscular once  insulin lispro (ADMELOG) corrective regimen sliding scale   SubCutaneous Before meals and at bedtime  insulin lispro Injectable (ADMELOG) 2 Unit(s) SubCutaneous three times a day before meals  melatonin 3 milliGRAM(s) Oral at bedtime  ondansetron Injectable 4 milliGRAM(s) IV Push every 6 hours PRN  oxycodone    5 mG/acetaminophen 325 mG 1 Tablet(s) Oral every 6 hours PRN  sacubitril 24 mG/valsartan 26 mG 1 Tablet(s) Oral two times a day  vancomycin  IVPB 1000 milliGRAM(s) IV Intermittent every 12 hours    FHNo pertinent family history    ,   PMHCVA (cerebral vascular accident)    HTN (hypertension)    Anxiety    DM (diabetes mellitus)    Chronic CHF    Chronic systolic congestive heart failure       PSHNo pertinent past surgical history        Labs                          10.3   10.73 )-----------( 361      ( 22 Oct 2021 07:55 )             30.9      10-22    137  |  101  |  12  ----------------------------<  189<H>  3.9   |  28  |  1.04    Ca    9.1      22 Oct 2021 07:55    TPro  6.9  /  Alb  2.4<L>  /  TBili  0.4  /  DBili  x   /  AST  10  /  ALT  22  /  AlkPhos  66  10-21     Vital Signs Last 24 Hrs  T(C): 36.7 (22 Oct 2021 05:29), Max: 37.8 (21 Oct 2021 20:20)  T(F): 98 (22 Oct 2021 05:29), Max: 100 (21 Oct 2021 20:20)  HR: 92 (22 Oct 2021 05:29) (81 - 94)  BP: 103/72 (22 Oct 2021 05:29) (103/72 - 118/74)  BP(mean): 82 (21 Oct 2021 13:50) (82 - 82)  RR: 19 (22 Oct 2021 05:29) (16 - 19)  SpO2: 99% (22 Oct 2021 05:29) (99% - 100%)  Sedimentation Rate, Erythrocyte: 98 mm/Hr (10-21-21 @ 04:40)  Sedimentation Rate, Erythrocyte: 99 mm/Hr (10-15-21 @ 13:37)         C-Reactive Protein, Serum: 42 mg/L (10-21-21 @ 14:41)  C-Reactive Protein, Serum: 106 mg/L (10-16-21 @ 01:09)   WBC Count: 10.73 K/uL *H* (10-22-21 @ 07:55)       PHYSICAL EXAM  GEN: CLARIBEL REICH is a pleasant well-nourished, well developed 51y Male in no acute distress, alert awake, and oriented to person, place and time.   LE Focused:    Vasc: DP/PT pulses faintly palpable, CFT < 5 seconds to digits, TG warm to cool, pitting edema present on bilateral legs  Derm: Surgical site R 2nd and 3rd digit amputation left open with R 2nd & 3rd metatarsal heads visible. No purlent drainage noted. No streaking or erythema noted. No clinical signs of infection noted to R surgical site.  Neuro: Protective sensation grossly diminished  MSK: Able to wiggles toes. R 2nd & 3rd digit amputation     10/15: s/p R foot bedside I&D: tracking noted in all directions, probes to bone, serosanguinous drainage noted       Imaging:    EXAM:  MR FOOT RT                          PROCEDURE DATE:  10/18/2021      INTERPRETATION:  EXAMINATION: MR FOOT RIGHT    CLINICAL INDICATION:Evaluate for osteomyelitis    COMPARISON: Radiographs dated 10/15/2021    TECHNIQUE: Multiplanar, multi-sequence MRI of the right foot was performed without intravenous contrast.    INTERPRETATION:    Bones and soft tissues: There is no fracture. There is patchy STIR hyperintensity within the second digit proximal phalanx with mild patchy T1 hypointensity seen on the short axis TI images. Similar finding is seen at the base of the third digit proximal phalanx. The findings are favored to represent early osteomyelitis, with reactive osteitis considered less likely.    There is extensive surrounding soft tissue edema and phlegmon at the second and third digits which may represent abscess-in -formation with blistering of the overlying skin. Multiple foci of hypointensity seen on series 6 and 7 at the second and third digits may be related to vascular atherosclerosis versus foci of soft tissue gas. Advise radiographic correlation (prior radiographs are dated 10/15/2021).    Muscles: There is extensive edema within the intrinsic muscles of the foot which may be seen in neuropathy.    IMPRESSION:  1.  Findings favored to represent osteomyelitis involving second and third digit proximal phalanges.    2.  Extensive overlying soft tissue infection involving the second and third digits with surrounding edema and phlegmon, may represent abscess-in-formation. Additionally, multiple rounded foci of hypointensity in soft tissue at the second and third digits may be related to vascular atherosclerosis versus foci of soft tissue gas. Advise radiographic correlation (prior radiographs are dated 10/15/2021).      EXAM:  XR FOOT 2 VIEWS RT                          PROCEDURE DATE:  10/15/2021        INTERPRETATION:  Cellulitis swollen right foot.    3 views right foot.    No fracture or dislocation. No focal bone lysis or unusual periosteal reaction. Joint spaces preserved. Small calcaneal osteophytes. Vascular calcification. Soft tissue swelling mostly over the dorsum of the foot may reflect cellulitis. No soft tissue gas.    IMPRESSION: No acute or destructive osseous pathology.    If there is clinical suspicion of osteomyelitis consider bone scan or MRI        EXAM:  US PHYSIOL LWR EXT 3+ LEV BI                          PROCEDURE DATE:  10/16/2021      INTERPRETATION:  Clinical indication: Ischemic toe    COMPARISON: None    FINDINGS: There are pulsatile waveforms bilaterally. Segmental pressures could not be obtained due to calcified, noncompressible vessels. Therefore, ABIs could not be calculated.    IMPRESSION: ABIs could not be calculated due to calcified, noncompressible vessels.      Culture  Specimen Source: .Surgical Swab R foot plantar wound (10.16.21 @ 02:28) Culture Results:   Few Streptococcus agalactiae (Group B) isolated   Group B streptococci are susceptible to ampicillin,   penicillin and cefazolin, but may be resistant to   erythromycin and clindamycin.   Recommendations for intrapartum prophylaxis for Group B   streptococci are penicillin or ampicillin. (10.16.21 @ 02:28)

## 2021-10-22 NOTE — PROGRESS NOTE ADULT - ATTENDING COMMENTS
Patient seen and examined this morning with Housestaff/ Medical students.     51 year old male with PMH CVA (10 years ago, no deficits), HTN, DM, CHF (EF 30-35% 2/21), presented with right foot swelling. Patient stated two weeks ago he noticed bleeding in his shoes and found a staple stuck to the bottom of his feet. He didn't notice it before because he states he doesn't have any sensation in his feet. He went to urgent care, where they prescribed him Keflex and due to progressive worsening leg swelling send from Urgent Care to ED. Patient admitted with Cellulitis and Abscess found to have osteomyelitis to the second and third toe s/p amputation in OR yesterday.     Patient doing well in no acute distress. Had somewhat more pain. Got Oxycodone which made him somewhat more sedated.  denies fever, chills, SOB, palpitations, chest pain, nausea, vomiting, diarrhea, constipation, dizziness    Vital Signs Last 24 Hrs  T(C): 36.7 (22 Oct 2021 05:29), Max: 37.8 (21 Oct 2021 20:20)  T(F): 98 (22 Oct 2021 05:29), Max: 100 (21 Oct 2021 20:20)  HR: 92 (22 Oct 2021 05:29) (92 - 94)  BP: 103/72 (22 Oct 2021 05:29) (103/72 - 118/74)  RR: 19 (22 Oct 2021 05:29) (18 - 19)  SpO2: 99% (22 Oct 2021 05:29) (99% - 100%)    P/E: As above  middle aged male, comfortable in bed, NAD  Neck: No JVD, no thyromegaly  Mood improved  Neuro: AAO x3; No  gross focal deficits  CVS: S1S2 present, regular  Resp: BLAE+, No wheeze or Rhonchi  GI: Soft, BS+, NT  Extr: No edema or calf tenderness Left leg  Right foot dressing intact with wound vac in place; pictures form this morning good healing, adequate blood flow    Labs: Reviewed                                 10.3   10.73 )-----------( 361      ( 22 Oct 2021 07:55 )             30.9   10-22    137  |  101  |  12  ----------------------------<  189<H>  3.9   |  28  |  1.04    Ca    9.1      22 Oct 2021 07:55    TPro  6.9  /  Alb  2.4<L>  /  TBili  0.4  /  DBili  x   /  AST  10  /  ALT  22  /  AlkPhos  66  10-21    Vancomycin Level, Trough (10.21.21 @ 04:40) Vancomycin Level, Trough: 18.3:      A/P:  Cellulitis of the Right foot with abscess and Osteomyelitis second and third digit s/p amputation  PVD   DM with likely Neuropathy sugars controlled  Chronic systolic CHF stable  Severe Pulmonary HTN  CEDRIC improved   HTN stable  Non compliance with medication regimen  ?? Depressed mood    Plan:  MRI showing OM, Cont Vancomycin and Cefepime s/p amputation of right 2nd and 3rd toe amputation  Vanco trough therapeutic; ID follow up for duration of antibiotics  Discussed with ID will follow up Bone biopsy from proximal margin of amputation site  Podiatry follow up noted; Planned for delayed closure of the wound Monday/ Tuesday  Continue  Oxycodone PRN q 6hrs for better pain control  MIGDALIA/PVR showed non compressible vessels. Vascular surgery on board. CT Angio with mild occlusion without intervention; may benefit form antiplatelet therapy once podiatrically stable; Continue Aspirin for now  Spoke with Patient again; counseled on  importance of better medication compliance with Dr. haas at bedside this morning and encouraged to take to see if any ADRs while in hospital over next few days.    He was strongly encouraged to at least take medications for his Heart failure which can improve his cardiac function.   Diet and Exercise counseled to improve BP and sugar control  DVT ppx    Discussed with Medical student/ Housestaff at length findings and plan of care  Discussed with Patient and RN Patient seen and examined this morning with Housestaff/ Medical students.     51 year old male with PMH CVA (10 years ago, no deficits), HTN, DM, CHF (EF 30-35% 2/21), presented with right foot swelling. Patient stated two weeks ago he noticed bleeding in his shoes and found a staple stuck to the bottom of his feet. He didn't notice it before because he states he doesn't have any sensation in his feet. He went to urgent care, where they prescribed him Keflex and due to progressive worsening leg swelling send from Urgent Care to ED. Patient admitted with Cellulitis and Abscess found to have osteomyelitis to the second and third toe s/p amputation in OR yesterday.     Patient doing well in no acute distress. Had somewhat more pain. Got Oxycodone which made him somewhat more sedated.  denies fever, chills, SOB, palpitations, chest pain, nausea, vomiting, diarrhea, constipation, dizziness    Vital Signs Last 24 Hrs  T(C): 36.7 (22 Oct 2021 05:29), Max: 37.8 (21 Oct 2021 20:20)  T(F): 98 (22 Oct 2021 05:29), Max: 100 (21 Oct 2021 20:20)  HR: 92 (22 Oct 2021 05:29) (92 - 94)  BP: 103/72 (22 Oct 2021 05:29) (103/72 - 118/74)  RR: 19 (22 Oct 2021 05:29) (18 - 19)  SpO2: 99% (22 Oct 2021 05:29) (99% - 100%)    P/E: As above  middle aged male, comfortable in bed, NAD  Neck: No JVD, no thyromegaly  Mood improved  Neuro: AAO x3; No  gross focal deficits  CVS: S1S2 present, regular  Resp: BLAE+, No wheeze or Rhonchi  GI: Soft, BS+, NT  Extr: No edema or calf tenderness Left leg  Right foot dressing intact with wound vac in place; pictures form this morning good healing, adequate blood flow    Labs: Reviewed                                 10.3   10.73 )-----------( 361      ( 22 Oct 2021 07:55 )             30.9   10-22    137  |  101  |  12  ----------------------------<  189<H>  3.9   |  28  |  1.04    Ca    9.1      22 Oct 2021 07:55    TPro  6.9  /  Alb  2.4<L>  /  TBili  0.4  /  DBili  x   /  AST  10  /  ALT  22  /  AlkPhos  66  10-21    Vancomycin Level, Trough (10.21.21 @ 04:40) Vancomycin Level, Trough: 18.3:      A/P:  Cellulitis of the Right foot with abscess and Osteomyelitis second and third digit s/p amputation  PVD   DM with likely Neuropathy sugars controlled  Chronic systolic CHF stable  Severe Pulmonary HTN  CEDRIC improved   HTN stable  Non compliance with medication regimen  ?? Depressed mood    Plan:  MRI showing OM, Cont Vancomycin and Cefepime s/p amputation of right 2nd and 3rd toe amputation  Vanco trough therapeutic; ID follow up for duration of antibiotics  Discussed with ID will follow up Bone biopsy from proximal margin of amputation site  Podiatry follow up noted; Planned for delayed closure of the wound Monday/ Tuesday  Continue  Oxycodone PRN q 6hrs for better pain control  MIGDALIA/PVR showed non compressible vessels. Vascular surgery on board. CT Angio with mild occlusion without intervention; may benefit form antiplatelet therapy once podiatrically stable; Continue Aspirin for now  Spoke with Patient again; counseled on  importance of better medication compliance with Dr. haas at bedside this morning and encouraged to take to see if any ADRs while in hospital over next few days.    He was strongly encouraged to at least take medications for his Heart failure which can improve his cardiac function.   Diet and Exercise counseled to improve BP and sugar control  DVT ppx  HOLD LABS TOMORROW; VANCO TROUGH AND ROUTINE LABS SUNDAY    Discussed with Medical student/ Housestaff at length findings and plan of care  Discussed with Patient (more open to counseling) and RN

## 2021-10-22 NOTE — PROGRESS NOTE ADULT - PROBLEM SELECTOR PLAN 1
Cont Vancomycin and Cefepime  Vanc Trough 10/21 18.3  F/u Vanc Trough 10/23  Dr Mesa - ID consulted  Cx: Few Streptococcus agalactiae (Group B) isolated sensitive to Ampicillin and Penicillin  MRI:  Findings favored to represent osteomyelitis involving second and third digit proximal phalanges.  Extensive overlying soft tissue infection involving the second and third digits with surrounding edema and phlegmon, may represent abscess-in-formation. Additionally, multiple rounded foci of hypointensity in soft tissue at the second and third digits may be related to vascular atherosclerosis versus foci of soft tissue gas.  MIGDALIA/PVR shows noncompressible vessels   Vascular consulted - Angiogram shows Inline flow to R foot via dominant PT and smaller peroneal with AT occlusion  Podiatry consulted - pt. had 2nd and 3rd digit of R. Foot amputated on 10/20  f/u Podiatry plan - Wound vac changed today,   Podiatry plan to take to OR Tuesday 10/26 for delayed primary closure at 2:00pm with Dr. Kitchen  c/w Abx as per ID  Pain Management: oxycodone 5 mG/acetaminophen 325 mG PRN q6h

## 2021-10-22 NOTE — PROGRESS NOTE ADULT - PROBLEM SELECTOR PLAN 4
Patient noncompliant w/ meds   On Lisinopril 2.5mg at home  monitor BP   start entresto   encourage medication compliance   DASH diet

## 2021-10-22 NOTE — PROGRESS NOTE ADULT - PROBLEM SELECTOR PLAN 2
Not compliant with medications  Primary cardiologist Dr. Fairchild  Echo ordered 10/19 showing EF of 30-35%; GIDD  Held Lasix dose in 10/19 will resume after angiogram  Cardio consult 10/21 recommend to start entresto 26-24 BID  fluid restrictions; strict I/O; daily weight   encourage medication compliance

## 2021-10-23 LAB
GLUCOSE BLDC GLUCOMTR-MCNC: 179 MG/DL — HIGH (ref 70–99)
GLUCOSE BLDC GLUCOMTR-MCNC: 184 MG/DL — HIGH (ref 70–99)
GLUCOSE BLDC GLUCOMTR-MCNC: 199 MG/DL — HIGH (ref 70–99)
GLUCOSE BLDC GLUCOMTR-MCNC: 201 MG/DL — HIGH (ref 70–99)
VANCOMYCIN TROUGH SERPL-MCNC: 13 UG/ML — SIGNIFICANT CHANGE UP (ref 10–20)
VANCOMYCIN TROUGH SERPL-MCNC: 35.6 UG/ML — CRITICAL HIGH (ref 10–20)

## 2021-10-23 PROCEDURE — 99231 SBSQ HOSP IP/OBS SF/LOW 25: CPT

## 2021-10-23 RX ORDER — POLYETHYLENE GLYCOL 3350 17 G/17G
17 POWDER, FOR SOLUTION ORAL DAILY
Refills: 0 | Status: COMPLETED | OUTPATIENT
Start: 2021-10-23 | End: 2021-10-25

## 2021-10-23 RX ORDER — SENNA PLUS 8.6 MG/1
2 TABLET ORAL AT BEDTIME
Refills: 0 | Status: DISCONTINUED | OUTPATIENT
Start: 2021-10-23 | End: 2021-10-26

## 2021-10-23 RX ORDER — ENOXAPARIN SODIUM 100 MG/ML
40 INJECTION SUBCUTANEOUS DAILY
Refills: 0 | Status: DISCONTINUED | OUTPATIENT
Start: 2021-10-23 | End: 2021-10-26

## 2021-10-23 RX ORDER — ALPRAZOLAM 0.25 MG
0.5 TABLET ORAL
Refills: 0 | Status: DISCONTINUED | OUTPATIENT
Start: 2021-10-23 | End: 2021-10-26

## 2021-10-23 RX ADMIN — CHLORHEXIDINE GLUCONATE 1 APPLICATION(S): 213 SOLUTION TOPICAL at 06:01

## 2021-10-23 RX ADMIN — Medication 0.5 MILLIGRAM(S): at 11:31

## 2021-10-23 RX ADMIN — Medication 81 MILLIGRAM(S): at 11:32

## 2021-10-23 RX ADMIN — CEFEPIME 100 MILLIGRAM(S): 1 INJECTION, POWDER, FOR SOLUTION INTRAMUSCULAR; INTRAVENOUS at 05:18

## 2021-10-23 RX ADMIN — POLYETHYLENE GLYCOL 3350 17 GRAM(S): 17 POWDER, FOR SOLUTION ORAL at 11:39

## 2021-10-23 RX ADMIN — Medication 3 MILLIGRAM(S): at 21:38

## 2021-10-23 RX ADMIN — Medication 250 MILLIGRAM(S): at 19:39

## 2021-10-23 RX ADMIN — Medication 250 MILLIGRAM(S): at 05:18

## 2021-10-23 RX ADMIN — CEFEPIME 100 MILLIGRAM(S): 1 INJECTION, POWDER, FOR SOLUTION INTRAMUSCULAR; INTRAVENOUS at 18:21

## 2021-10-23 NOTE — CHART NOTE - NSCHARTNOTEFT_GEN_A_CORE
Patient noted with elevated vancomycin trough. On further evaluation, vancomycin trough was drawn after administration of vancomycin. Will obtain another vanc trough prior to the next dose. Follow vanc trough at 5PM.

## 2021-10-24 LAB
ALBUMIN SERPL ELPH-MCNC: 2.4 G/DL — LOW (ref 3.5–5)
ALP SERPL-CCNC: 60 U/L — SIGNIFICANT CHANGE UP (ref 40–120)
ALT FLD-CCNC: 23 U/L DA — SIGNIFICANT CHANGE UP (ref 10–60)
ANION GAP SERPL CALC-SCNC: 5 MMOL/L — SIGNIFICANT CHANGE UP (ref 5–17)
AST SERPL-CCNC: 14 U/L — SIGNIFICANT CHANGE UP (ref 10–40)
BILIRUB SERPL-MCNC: 0.4 MG/DL — SIGNIFICANT CHANGE UP (ref 0.2–1.2)
BUN SERPL-MCNC: 17 MG/DL — SIGNIFICANT CHANGE UP (ref 7–18)
CALCIUM SERPL-MCNC: 8.7 MG/DL — SIGNIFICANT CHANGE UP (ref 8.4–10.5)
CHLORIDE SERPL-SCNC: 100 MMOL/L — SIGNIFICANT CHANGE UP (ref 96–108)
CO2 SERPL-SCNC: 30 MMOL/L — SIGNIFICANT CHANGE UP (ref 22–31)
CREAT SERPL-MCNC: 1.22 MG/DL — SIGNIFICANT CHANGE UP (ref 0.5–1.3)
GLUCOSE BLDC GLUCOMTR-MCNC: 165 MG/DL — HIGH (ref 70–99)
GLUCOSE BLDC GLUCOMTR-MCNC: 190 MG/DL — HIGH (ref 70–99)
GLUCOSE BLDC GLUCOMTR-MCNC: 208 MG/DL — HIGH (ref 70–99)
GLUCOSE BLDC GLUCOMTR-MCNC: 224 MG/DL — HIGH (ref 70–99)
GLUCOSE SERPL-MCNC: 208 MG/DL — HIGH (ref 70–99)
HCT VFR BLD CALC: 30.6 % — LOW (ref 39–50)
HGB BLD-MCNC: 10.1 G/DL — LOW (ref 13–17)
MCHC RBC-ENTMCNC: 29.4 PG — SIGNIFICANT CHANGE UP (ref 27–34)
MCHC RBC-ENTMCNC: 33 GM/DL — SIGNIFICANT CHANGE UP (ref 32–36)
MCV RBC AUTO: 89.2 FL — SIGNIFICANT CHANGE UP (ref 80–100)
NRBC # BLD: 0 /100 WBCS — SIGNIFICANT CHANGE UP (ref 0–0)
PLATELET # BLD AUTO: 331 K/UL — SIGNIFICANT CHANGE UP (ref 150–400)
POTASSIUM SERPL-MCNC: 4.3 MMOL/L — SIGNIFICANT CHANGE UP (ref 3.5–5.3)
POTASSIUM SERPL-SCNC: 4.3 MMOL/L — SIGNIFICANT CHANGE UP (ref 3.5–5.3)
PROT SERPL-MCNC: 7.6 G/DL — SIGNIFICANT CHANGE UP (ref 6–8.3)
RBC # BLD: 3.43 M/UL — LOW (ref 4.2–5.8)
RBC # FLD: 12.2 % — SIGNIFICANT CHANGE UP (ref 10.3–14.5)
SODIUM SERPL-SCNC: 135 MMOL/L — SIGNIFICANT CHANGE UP (ref 135–145)
WBC # BLD: 9.09 K/UL — SIGNIFICANT CHANGE UP (ref 3.8–10.5)
WBC # FLD AUTO: 9.09 K/UL — SIGNIFICANT CHANGE UP (ref 3.8–10.5)

## 2021-10-24 PROCEDURE — 99232 SBSQ HOSP IP/OBS MODERATE 35: CPT | Mod: GC

## 2021-10-24 RX ORDER — MEROPENEM 1 G/30ML
1000 INJECTION INTRAVENOUS EVERY 8 HOURS
Refills: 0 | Status: DISCONTINUED | OUTPATIENT
Start: 2021-10-24 | End: 2021-10-26

## 2021-10-24 RX ORDER — LACTULOSE 10 G/15ML
10 SOLUTION ORAL ONCE
Refills: 0 | Status: COMPLETED | OUTPATIENT
Start: 2021-10-24 | End: 2021-10-24

## 2021-10-24 RX ADMIN — MEROPENEM 100 MILLIGRAM(S): 1 INJECTION INTRAVENOUS at 13:40

## 2021-10-24 RX ADMIN — CHLORHEXIDINE GLUCONATE 1 APPLICATION(S): 213 SOLUTION TOPICAL at 05:39

## 2021-10-24 RX ADMIN — Medication 250 MILLIGRAM(S): at 05:29

## 2021-10-24 RX ADMIN — MEROPENEM 100 MILLIGRAM(S): 1 INJECTION INTRAVENOUS at 21:13

## 2021-10-24 RX ADMIN — CEFEPIME 100 MILLIGRAM(S): 1 INJECTION, POWDER, FOR SOLUTION INTRAMUSCULAR; INTRAVENOUS at 05:29

## 2021-10-24 RX ADMIN — Medication 250 MILLIGRAM(S): at 18:23

## 2021-10-24 NOTE — PROGRESS NOTE ADULT - SUBJECTIVE AND OBJECTIVE BOX
PGY-1 Progress Note discussed with attending    PAGER #: [[phone]] TILL 5:00 PM  PLEASE CONTACT ON CALL TEAM:  - On Call Team (Please refer to Quintin) FROM 5:00 PM - 8:30PM  - Nightfloat Team FROM 8:30 -7:30 AM    CHIEF COMPLAINT & BRIEF HOSPITAL COURSE:  51 year old male with PMH CVA (10 years ago, no deficits), HTN, DM, CHF (EF 30-35% 2/21), presented with right foot swelling. Patient stated two weeks ago he noticed bleeding in his shoes and found a staple stuck to the bottom of his feet. He didn't notice it before because he states he doesn't have any sensation in his feet. He went to urgent care, where they prescribed him Keflex Q8hrs. Patient states the foot started to swell more so he returned to urgent care yesterday where they gave him a prescription of clindamycin. Patient this morning started to endorse chills, nausea, but denies any fever, vomiting, chest pain, SOB, abdominal pain, or changes in bowel habits.  Patient was admitted in February due to covid, and was found to have CHF and DM. Patient has not been complaint with any of his medications and states he just takes Lasix and Xanax. MRI 10/18 shows proximal OM of 2nd and 3rd digit of R. Foot. Angiogram 10/19 shows Inline flow to R foot via dominant PT and smaller peroneal with AT occlusion. Pt. had amputation of the 2nd and 3rd digit on the R. foot on 10/20.    INTERVAL HPI/OVERNIGHT EVENTS:   Pt. s/p 2nd and 3rd digit amputation of R. Foot POD#2. Pt refused entresto, states it makes him more fatigued. Pt feeling more anxious today. Foot pain has been constant.    MEDICATIONS  (STANDING):  aspirin enteric coated 81 milliGRAM(s) Oral daily  atorvastatin 80 milliGRAM(s) Oral at bedtime  cefepime   IVPB 2000 milliGRAM(s) IV Intermittent every 12 hours  chlorhexidine 2% Cloths 1 Application(s) Topical <User Schedule>  influenza   Vaccine 0.5 milliLiter(s) IntraMuscular once  insulin lispro (ADMELOG) corrective regimen sliding scale   SubCutaneous Before meals and at bedtime  insulin lispro Injectable (ADMELOG) 2 Unit(s) SubCutaneous three times a day before meals  melatonin 3 milliGRAM(s) Oral at bedtime  sacubitril 24 mG/valsartan 26 mG 1 Tablet(s) Oral two times a day  vancomycin  IVPB 1000 milliGRAM(s) IV Intermittent every 12 hours    MEDICATIONS  (PRN):  ondansetron Injectable 4 milliGRAM(s) IV Push every 6 hours PRN Nausea and/or Vomiting  oxycodone    5 mG/acetaminophen 325 mG 1 Tablet(s) Oral every 6 hours PRN Moderate Pain (4 - 6)    REVIEW OF SYSTEMS:  CONSTITUTIONAL: No fever, chills, weight loss, or fatigue  RESPIRATORY: No dry cough, Denies wheezing, chills or hemoptysis; No shortness of breath  CARDIOVASCULAR: No chest pain, palpitations, dizziness, or leg swelling  GASTROINTESTINAL: Has BM today. No abdominal pain. No vomiting, or hematemesis; No diarrhea or constipation. No melena or hematochezia.  GENITOURINARY: No dysuria or hematuria, urinary frequency  NEUROLOGICAL: No headaches, memory loss, loss of strength  MSK: Right foot pain, dull 6/10  SKIN: Slightly bruising and and discoloration on R anterior leg    Vital Signs Last 24 Hrs  T(C): 36.7 (22 Oct 2021 05:29), Max: 37.8 (21 Oct 2021 20:20)  T(F): 98 (22 Oct 2021 05:29), Max: 100 (21 Oct 2021 20:20)  HR: 92 (22 Oct 2021 05:29) (81 - 94)  BP: 103/72 (22 Oct 2021 05:29) (103/72 - 118/74)  BP(mean): 82 (21 Oct 2021 13:50) (82 - 82)  RR: 19 (22 Oct 2021 05:29) (16 - 19)  SpO2: 99% (22 Oct 2021 05:29) (99% - 100%)    PHYSICAL EXAMINATION:  GENERAL: NAD, well built  HEAD:  Atraumatic, Normocephalic  EYES:  conjunctiva and sclera clear  NECK: Supple, No JVD, Normal thyroid  CHEST/LUNG: no crackles in b/l lung bases. No rales, rhonchi, wheezing, or rubs  HEART: Regular rate and rhythm; No murmurs, rubs, or gallops  ABDOMEN: Soft, Nontender, Nondistended; Bowel sounds present  NERVOUS SYSTEM:  Alert & Oriented X3  EXTREMITIES:  1+ pitting edema b/l 2+ Peripheral Pulses, No clubbing, cyanosis  SKIN: s/p R. Foot 2nd and 3rd digit amputation, wound dressed.                                 10.3   10.73 )-----------( 361      ( 22 Oct 2021 07:55 )             30.9       137  |  101  |  12  ----------------------------<  189<H>  3.9   |  28  |  1.04    Ca    9.1      22 Oct 2021 07:55    TPro  6.9  /  Alb  2.4<L>  /  TBili  0.4  /  DBili  x   /  AST  10  /  ALT  22  /  AlkPhos  66  10-21      CAPILLARY BLOOD GLUCOSE    POCT Blood Glucose.: 163 mg/dL (22 Oct 2021 11:32)  POCT Blood Glucose.: 192 mg/dL (22 Oct 2021 08:05)  POCT Blood Glucose.: 210 mg/dL (21 Oct 2021 21:31)  POCT Blood Glucose.: 192 mg/dL (21 Oct 2021 16:24)      RADIOLOGY & ADDITIONAL TESTS:    EXAM:  US PHYSIOL LWR EXT 3+ LEV BI                        PROCEDURE DATE:  10/16/2021    INTERPRETATION:  Clinical indication: Ischemic toe  COMPARISON: None  FINDINGS: There are pulsatile waveforms bilaterally. Segmental pressures could not be obtained due to calcified, noncompressible vessels. Therefore, ABIs could not be calculated.  IMPRESSION: ABIs could not be calculated due to calcified, noncompressible vessels.    ------------------------------------------------------------------------    EXAM:  XR FOOT 2 VIEWS RT                        PROCEDURE DATE:  10/15/2021    INTERPRETATION:  Cellulitis swollen right foot.  3 views right foot.  No fracture or dislocation. No focal bone lysis or unusual periosteal reaction. Joint spaces preserved. Small calcaneal osteophytes. Vascular calcification. Soft tissue swelling mostly over the dorsum of the foot may reflect cellulitis. No soft tissue gas.  IMPRESSION: No acute or destructive osseous pathology.  If there is clinical suspicion of osteomyelitis consider bone scan or MRI    ------------------------------------------------------------------------    EXAM:  MR FOOT RT                        PROCEDURE DATE:  10/18/2021    INTERPRETATION:  EXAMINATION: MR FOOT RIGHT  CLINICAL INDICATION: Evaluate for osteomyelitis  COMPARISON: Radiographs dated 10/15/2021  TECHNIQUE: Multiplanar, multi-sequence MRI of the right foot was performed without intravenous contrast.  INTERPRETATION:  Bones and soft tissues: There is no fracture. There is patchy STIR hyperintensity within the second digit proximal phalanx with mild patchy T1 hypointensity seen on the short axis TI images. Similar finding is seen at the base of the third digit proximal phalanx. The findings are favored to represent early osteomyelitis, with reactive osteitis considered less likely.  There is extensive surrounding soft tissue edema and phlegmon at the second and third digits which may represent abscess-in -formation with blistering of the overlying skin. Multiple foci of hypointensity seen on series 6 and 7 at the second and third digits may be related to vascular atherosclerosis versus foci of soft tissue gas. Advise radiographic correlation (prior radiographs are dated 10/15/2021).  Muscles: There is extensive edema within the intrinsic muscles of the foot which may be seen in neuropathy.  IMPRESSION:  1.  Findings favored to represent osteomyelitis involving second and third digit proximal phalanges.  2.  Extensive overlying soft tissue infection involving the second and third digits with surrounding edema and phlegmon, may represent abscess-in-formation. Additionally, multiple rounded foci of hypointensity in soft tissue at the second and third digits may be related to vascular atherosclerosis versus foci of soft tissue gas. Advise radiographic correlation (prior radiographs are dated 10/15/2021).    ------------------------------------------------------------------------    PROCEDURE: Transthoracic echocardiogram with 2-D, M-Mode  and complete spectral and color flow Doppler.  Study Date: 10/19/2021  INDICATION: Cardiomyopathy, unspecified (I42.9)  HISTORY:    DIMENSIONS:  Dimensions:     Normal Values:  LA:     4.9 cm    2.0 - 4.0 cm  Ao:     3.9 cm    2.0 - 3.8 cm  SEPTUM: 0.9 cm    0.6 - 1.2 cm  PWT:    1.0 cm    0.6 - 1.1 cm  LVIDd:  5.9 cm    3.0 - 5.6 cm  LVIDs:  5.9 cm    1.8 - 4.0 cm    Derived Variables:  LVMI: 101 g/m2  RWT: 0.33  Ejection Fraction Visual Estimate: 30-35 %  Ejection Fraction Stevens: 33 %    OBSERVATIONS:  Mitral Valve: Normal mitral valve. Mild mitral  regurgitation.  Aortic Root: Aortic Root: 3.9 cm.    Aortic Valve: Calcified trileaflet aortic valve with normal  opening. Mild aortic regurgitation.  Left Atrium: Normal left atrium.  LA volume index = 31  cc/m2.  Left Ventricle: Dilated lv with severe segmental left  ventricular systolic dysfunction.The distal  septum,apex,inferiorand infero-lateral walls are  hypokinetic EF=30-35%. Normal left ventricular internal  dimensions and wall thicknesses. Grade I diastolic  dysfunction (Impaired relaxation, mild).  Right Heart: Normal right atrium. Normal right ventricular  size and systolic function (TAPSE  1.7cm). There is mild  tricuspid regurgitation. Normal pulmonic valve.  Pericardium/PleuraNormal pericardium with no pericardial  effusion.  Hemodynamic: RV systolic pressure is severely increased at  65 mm Hg.    CONCLUSIONS:  1. Normal mitral valve. Mild mitral regurgitation.  2. Calcified trileaflet aortic valve with normal opening.  Mild aortic regurgitation.  3. Aortic Root: 3.9 cm.    4. Normal left atrium.  LA volume index = 31 cc/m2.  5. Normal left ventricular internal dimensions and wall  thicknesses.  6. Dilated lv with severe segmental left ventricular  systolic dysfunction.The distal septum,apex,inferiorand  infero-lateral walls are hypokinetic EF=30-35%.  7. Grade I diastolic dysfunction (Impaired relaxation,  mild).  8. Normal right atrium.  9. Normal right ventricular size and systolic function  (TAPSE  1.7cm).  10. RV systolic pressure is severely increased at  65 mm  Hg.  11. There is mild tricuspid regurgitation.  12. Normal pulmonic valve.  13. Normal pericardium with no pericardial effusion.    ------------------------------------------------------------------------    EXAM:  XR FOOT COMP MIN 3 VIEWS RT                        PROCEDURE DATE:  10/20/2021    INTERPRETATION:  Right foot  HISTORY: Postop  COMPARISON: 10/15/2021   Two views of the right foot show amputation of the second and third toes. Wound VAC device is present. The joint spaces are maintained.  IMPRESSION: Postoperative changes.           PGY-1 Progress Note discussed with attending    PAGER #: [[phone]] TILL 5:00 PM  PLEASE CONTACT ON CALL TEAM:  - On Call Team (Please refer to Quintin) FROM 5:00 PM - 8:30PM  - Nightfloat Team FROM 8:30 -7:30 AM    CHIEF COMPLAINT & BRIEF HOSPITAL COURSE:  51 year old male with PMH CVA (10 years ago, no deficits), HTN, DM, CHF (EF 30-35% 2/21), presented with right foot swelling. Patient stated two weeks ago he noticed bleeding in his shoes and found a staple stuck to the bottom of his feet. He didn't notice it before because he states he doesn't have any sensation in his feet. He went to urgent care, where they prescribed him Keflex Q8hrs. Patient states the foot started to swell more so he returned to urgent care yesterday where they gave him a prescription of clindamycin. Patient this morning started to endorse chills, nausea, but denies any fever, vomiting, chest pain, SOB, abdominal pain, or changes in bowel habits.  Patient was admitted in February due to covid, and was found to have CHF and DM. Patient has not been complaint with any of his medications and states he just takes Lasix and Xanax. MRI 10/18 shows proximal OM of 2nd and 3rd digit of R. Foot. Angiogram 10/19 shows Inline flow to R foot via dominant PT and smaller peroneal with AT occlusion. Pt. had amputation of the 2nd and 3rd digit on the R. foot on 10/20.    INTERVAL HPI/OVERNIGHT EVENTS:   Pt. s/p 2nd and 3rd digit amputation of R. Foot POD#4. Pt refused entresto, states it makes him more fatigued. Pt feeling more anxious today. Foot pain has been constant.    MEDICATIONS  (STANDING):  aspirin enteric coated 81 milliGRAM(s) Oral daily  atorvastatin 80 milliGRAM(s) Oral at bedtime  cefepime   IVPB 2000 milliGRAM(s) IV Intermittent every 12 hours  chlorhexidine 2% Cloths 1 Application(s) Topical <User Schedule>  influenza   Vaccine 0.5 milliLiter(s) IntraMuscular once  insulin lispro (ADMELOG) corrective regimen sliding scale   SubCutaneous Before meals and at bedtime  insulin lispro Injectable (ADMELOG) 2 Unit(s) SubCutaneous three times a day before meals  melatonin 3 milliGRAM(s) Oral at bedtime  sacubitril 24 mG/valsartan 26 mG 1 Tablet(s) Oral two times a day  vancomycin  IVPB 1000 milliGRAM(s) IV Intermittent every 12 hours    MEDICATIONS  (PRN):  ondansetron Injectable 4 milliGRAM(s) IV Push every 6 hours PRN Nausea and/or Vomiting  oxycodone    5 mG/acetaminophen 325 mG 1 Tablet(s) Oral every 6 hours PRN Moderate Pain (4 - 6)    REVIEW OF SYSTEMS:  CONSTITUTIONAL: No fever, chills, weight loss, or fatigue  RESPIRATORY: No dry cough, Denies wheezing, chills or hemoptysis; No shortness of breath  CARDIOVASCULAR: No chest pain, palpitations, dizziness, or leg swelling  GASTROINTESTINAL: Has BM today. No abdominal pain. No vomiting, or hematemesis; No diarrhea or constipation. No melena or hematochezia.  GENITOURINARY: No dysuria or hematuria, urinary frequency  NEUROLOGICAL: No headaches, memory loss, loss of strength  MSK: Right foot pain, dull 6/10  SKIN: Slightly bruising and and discoloration on R anterior leg    Vital Signs Last 24 Hrs  T(C): 36.7 (22 Oct 2021 05:29), Max: 37.8 (21 Oct 2021 20:20)  T(F): 98 (22 Oct 2021 05:29), Max: 100 (21 Oct 2021 20:20)  HR: 92 (22 Oct 2021 05:29) (81 - 94)  BP: 103/72 (22 Oct 2021 05:29) (103/72 - 118/74)  BP(mean): 82 (21 Oct 2021 13:50) (82 - 82)  RR: 19 (22 Oct 2021 05:29) (16 - 19)  SpO2: 99% (22 Oct 2021 05:29) (99% - 100%)    PHYSICAL EXAMINATION:  GENERAL: NAD, well built  HEAD:  Atraumatic, Normocephalic  EYES:  conjunctiva and sclera clear  NECK: Supple, No JVD, Normal thyroid  CHEST/LUNG: no crackles in b/l lung bases. No rales, rhonchi, wheezing, or rubs  HEART: Regular rate and rhythm; No murmurs, rubs, or gallops  ABDOMEN: Soft, Nontender, Nondistended; Bowel sounds present  NERVOUS SYSTEM:  Alert & Oriented X3  EXTREMITIES:  1+ pitting edema b/l 2+ Peripheral Pulses, No clubbing, cyanosis  SKIN: s/p R. Foot 2nd and 3rd digit amputation, wound dressed.                                 10.3   10.73 )-----------( 361      ( 22 Oct 2021 07:55 )             30.9       137  |  101  |  12  ----------------------------<  189<H>  3.9   |  28  |  1.04    Ca    9.1      22 Oct 2021 07:55    TPro  6.9  /  Alb  2.4<L>  /  TBili  0.4  /  DBili  x   /  AST  10  /  ALT  22  /  AlkPhos  66  10-21      CAPILLARY BLOOD GLUCOSE    POCT Blood Glucose.: 163 mg/dL (22 Oct 2021 11:32)  POCT Blood Glucose.: 192 mg/dL (22 Oct 2021 08:05)  POCT Blood Glucose.: 210 mg/dL (21 Oct 2021 21:31)  POCT Blood Glucose.: 192 mg/dL (21 Oct 2021 16:24)      RADIOLOGY & ADDITIONAL TESTS:    EXAM:  US PHYSIOL LWR EXT 3+ LEV BI                        PROCEDURE DATE:  10/16/2021    INTERPRETATION:  Clinical indication: Ischemic toe  COMPARISON: None  FINDINGS: There are pulsatile waveforms bilaterally. Segmental pressures could not be obtained due to calcified, noncompressible vessels. Therefore, ABIs could not be calculated.  IMPRESSION: ABIs could not be calculated due to calcified, noncompressible vessels.    ------------------------------------------------------------------------    EXAM:  XR FOOT 2 VIEWS RT                        PROCEDURE DATE:  10/15/2021    INTERPRETATION:  Cellulitis swollen right foot.  3 views right foot.  No fracture or dislocation. No focal bone lysis or unusual periosteal reaction. Joint spaces preserved. Small calcaneal osteophytes. Vascular calcification. Soft tissue swelling mostly over the dorsum of the foot may reflect cellulitis. No soft tissue gas.  IMPRESSION: No acute or destructive osseous pathology.  If there is clinical suspicion of osteomyelitis consider bone scan or MRI    ------------------------------------------------------------------------    EXAM:  MR FOOT RT                        PROCEDURE DATE:  10/18/2021    INTERPRETATION:  EXAMINATION: MR FOOT RIGHT  CLINICAL INDICATION: Evaluate for osteomyelitis  COMPARISON: Radiographs dated 10/15/2021  TECHNIQUE: Multiplanar, multi-sequence MRI of the right foot was performed without intravenous contrast.  INTERPRETATION:  Bones and soft tissues: There is no fracture. There is patchy STIR hyperintensity within the second digit proximal phalanx with mild patchy T1 hypointensity seen on the short axis TI images. Similar finding is seen at the base of the third digit proximal phalanx. The findings are favored to represent early osteomyelitis, with reactive osteitis considered less likely.  There is extensive surrounding soft tissue edema and phlegmon at the second and third digits which may represent abscess-in -formation with blistering of the overlying skin. Multiple foci of hypointensity seen on series 6 and 7 at the second and third digits may be related to vascular atherosclerosis versus foci of soft tissue gas. Advise radiographic correlation (prior radiographs are dated 10/15/2021).  Muscles: There is extensive edema within the intrinsic muscles of the foot which may be seen in neuropathy.  IMPRESSION:  1.  Findings favored to represent osteomyelitis involving second and third digit proximal phalanges.  2.  Extensive overlying soft tissue infection involving the second and third digits with surrounding edema and phlegmon, may represent abscess-in-formation. Additionally, multiple rounded foci of hypointensity in soft tissue at the second and third digits may be related to vascular atherosclerosis versus foci of soft tissue gas. Advise radiographic correlation (prior radiographs are dated 10/15/2021).    ------------------------------------------------------------------------    PROCEDURE: Transthoracic echocardiogram with 2-D, M-Mode  and complete spectral and color flow Doppler.  Study Date: 10/19/2021  INDICATION: Cardiomyopathy, unspecified (I42.9)  HISTORY:    DIMENSIONS:  Dimensions:     Normal Values:  LA:     4.9 cm    2.0 - 4.0 cm  Ao:     3.9 cm    2.0 - 3.8 cm  SEPTUM: 0.9 cm    0.6 - 1.2 cm  PWT:    1.0 cm    0.6 - 1.1 cm  LVIDd:  5.9 cm    3.0 - 5.6 cm  LVIDs:  5.9 cm    1.8 - 4.0 cm    Derived Variables:  LVMI: 101 g/m2  RWT: 0.33  Ejection Fraction Visual Estimate: 30-35 %  Ejection Fraction Stevens: 33 %    OBSERVATIONS:  Mitral Valve: Normal mitral valve. Mild mitral  regurgitation.  Aortic Root: Aortic Root: 3.9 cm.    Aortic Valve: Calcified trileaflet aortic valve with normal  opening. Mild aortic regurgitation.  Left Atrium: Normal left atrium.  LA volume index = 31  cc/m2.  Left Ventricle: Dilated lv with severe segmental left  ventricular systolic dysfunction.The distal  septum,apex,inferiorand infero-lateral walls are  hypokinetic EF=30-35%. Normal left ventricular internal  dimensions and wall thicknesses. Grade I diastolic  dysfunction (Impaired relaxation, mild).  Right Heart: Normal right atrium. Normal right ventricular  size and systolic function (TAPSE  1.7cm). There is mild  tricuspid regurgitation. Normal pulmonic valve.  Pericardium/PleuraNormal pericardium with no pericardial  effusion.  Hemodynamic: RV systolic pressure is severely increased at  65 mm Hg.    CONCLUSIONS:  1. Normal mitral valve. Mild mitral regurgitation.  2. Calcified trileaflet aortic valve with normal opening.  Mild aortic regurgitation.  3. Aortic Root: 3.9 cm.    4. Normal left atrium.  LA volume index = 31 cc/m2.  5. Normal left ventricular internal dimensions and wall  thicknesses.  6. Dilated lv with severe segmental left ventricular  systolic dysfunction.The distal septum,apex,inferiorand  infero-lateral walls are hypokinetic EF=30-35%.  7. Grade I diastolic dysfunction (Impaired relaxation,  mild).  8. Normal right atrium.  9. Normal right ventricular size and systolic function  (TAPSE  1.7cm).  10. RV systolic pressure is severely increased at  65 mm  Hg.  11. There is mild tricuspid regurgitation.  12. Normal pulmonic valve.  13. Normal pericardium with no pericardial effusion.    ------------------------------------------------------------------------    EXAM:  XR FOOT COMP MIN 3 VIEWS RT                        PROCEDURE DATE:  10/20/2021    INTERPRETATION:  Right foot  HISTORY: Postop  COMPARISON: 10/15/2021   Two views of the right foot show amputation of the second and third toes. Wound VAC device is present. The joint spaces are maintained.  IMPRESSION: Postoperative changes.

## 2021-10-24 NOTE — PROGRESS NOTE ADULT - ASSESSMENT
Right foot and leg cellulitis  Osteomyelitis of right foot - s/p amputation of 2nd and 3rd toes    Plan:  ·	cont Vancomycin 1000mgs iv q12hrs   ·	dc Maxipime, switched to meropenem 1gm IV q8h for now  ·	awaiting pathology results      Right foot and leg cellulitis  Osteomyelitis of right foot - s/p amputation of 2nd and 3rd toes    Plan:  ·	cont Vancomycin 1000mgs iv q12hrs   ·	dc Maxipime, switched to meropenem 1gm IV q8h for now  ·	awaiting pathology results     I agree with above

## 2021-10-24 NOTE — CHART NOTE - NSCHARTNOTEFT_GEN_A_CORE
Late entry note for 10/23/21    Please note that I have visited Patient yesterday morning around 11 AM but a note was inadvertently missed.    Patient was doing well yesterday, complained of significant anxiety and nausea with Entresto.   Was placed on low dose Alprazolam 0.25 mg twice daily as needed.  He also did not had a bowel movement for few days since admission which probably contributes to nausea.   Discussed with patient and given Miralax.   He was again counseled on need to be compliant with his medications which has not really resulted in any ADRs other than subjective symptoms Late entry note for 10/23/21    Please note that I have visited Patient yesterday morning around 11 AM but a note was inadvertently missed.    Vital signs were reviewed and noted ot be stable yesterday; Also labs were requested for today.     P/E: clinically remained unchanged    Patient was doing well yesterday, complained of significant anxiety and nausea with Entresto.   Was placed on low dose Alprazolam 0.25 mg twice daily as needed.  He also did not had a bowel movement for few days since admission which probably contributes to nausea.   Discussed with patient and given Miralax.   He was again counseled on need to be compliant with his medications which has not really resulted in any ADRs other than subjective symptoms

## 2021-10-24 NOTE — PROGRESS NOTE ADULT - SUBJECTIVE AND OBJECTIVE BOX
51y Male is under our care for right foot/ leg cellulitis and osteo of right foot, s/p amputation of 2nd and 3rd toes. Patient is doing well, but admits he feeling fatigued lately. Also c/o mild dry cough at times. Wound cultures from both 2nd and 3rd metatarsals are growing e. coli esbl. Cefepime was switched to meropenem.    REVIEW OF SYSTEMS:  [  ] Not able to illicit  General: no fevers no malaise +fatigue  Chest: +dry cough no sob  GI: no nvd  : no urinary sxs   Skin: no rashes  Musculoskeletal: no trauma no LBP  Neuro: no ha's no dizziness     MEDS:  meropenem  IVPB 1000 milliGRAM(s) IV Intermittent every 8 hours  vancomycin  IVPB 1000 milliGRAM(s) IV Intermittent every 12 hours    ALLERGIES: Allergies    Nuts (Unknown)  tetracycline (Hives)    Intolerances      VITALS:  Vital Signs Last 24 Hrs  T(C): 36.3 (24 Oct 2021 13:50), Max: 36.7 (23 Oct 2021 20:33)  T(F): 97.4 (24 Oct 2021 13:50), Max: 98.1 (23 Oct 2021 20:33)  HR: 90 (24 Oct 2021 13:50) (86 - 98)  BP: 111/71 (24 Oct 2021 13:50) (101/70 - 119/76)  BP(mean): 79 (24 Oct 2021 13:50) (79 - 79)  RR: 16 (24 Oct 2021 13:50) (16 - 18)  SpO2: 100% (24 Oct 2021 13:50) (98% - 100%)      PHYSICAL EXAM:  HEENT: n/a  Neck: supple no LN's   Respiratory: lungs clear no rales  Cardiovascular: S1 S2 reg no murmurs  Gastrointestinal: +BS with soft, nondistended abdomen; nontender  Extremities: no edema  Skin: right foot is wrapped and secured to wound vac; faint warmth of right lower leg  Ortho: n/a  Neuro: AAO x 3      LABS/DIAGNOSTIC TESTS:                        10.1   9.09  )-----------( 331      ( 24 Oct 2021 12:37 )             30.6     WBC Count: 9.09 K/uL (10-24 @ 12:37)  WBC Count: 10.73 K/uL (10-22 @ 07:55)  WBC Count: 10.61 K/uL (10-21 @ 04:40)  WBC Count: 8.16 K/uL (10-20 @ 10:43)    10-24    135  |  100  |  17  ----------------------------<  208<H>  4.3   |  30  |  1.22    Ca    8.7      24 Oct 2021 12:37    TPro  7.6  /  Alb  2.4<L>  /  TBili  0.4  /  DBili  x   /  AST  14  /  ALT  23  /  AlkPhos  60  10-24        CULTURES:   .Tissue right 3rd metatarsal  10-20 @ 21:21   Rare Escherichia coli ESBL  --  Escherichia coli ESBL      .Tissue right 2nd metatarsal  10-20 @ 21:20   Few Escherichia coli ESBL  --  Escherichia coli ESBL      .Surgical Swab R foot plantar wound  10-16 @ 02:28   Numerous Finegoldia magna "Susceptibilities not performed"  Few Staphylococcus aureus  Few Streptococcus agalactiae (Group B) isolated  Group B streptococci are susceptible to ampicillin,  penicillin and cefazolin, but may be resistant to  erythromycin and clindamycin.  Recommendations for intrapartum prophylaxis for Group B  streptococci are penicillin or ampicillin.  --  Staphylococcus aureus        RADIOLOGY:  no new studies

## 2021-10-24 NOTE — PROGRESS NOTE ADULT - PROBLEM SELECTOR PLAN 1
Cont Vancomycin and Cefepime  Vanc Trough 10/25  Dr Mesa - ID consulted  Cx: Few Streptococcus agalactiae (Group B) isolated sensitive to Ampicillin and Penicillin  MRI:  Findings favored to represent osteomyelitis involving second and third digit proximal phalanges.  Extensive overlying soft tissue infection involving the second and third digits with surrounding edema and phlegmon, may represent abscess-in-formation. Additionally, multiple rounded foci of hypointensity in soft tissue at the second and third digits may be related to vascular atherosclerosis versus foci of soft tissue gas.  MIGDALIA/PVR shows noncompressible vessels   Vascular consulted - Angiogram shows Inline flow to R foot via dominant PT and smaller peroneal with AT occlusion  Podiatry consulted - pt. had 2nd and 3rd digit of R. Foot amputated on 10/20  Podiatry plan to take to OR Tuesday 10/26 for delayed primary closure at 2:00pm with Dr. Kitchen  c/w Abx as per ID  Pain Management: oxycodone 5 mG/acetaminophen 325 mG PRN q6h  Delayed closure of wound Monday or Tuesday

## 2021-10-24 NOTE — PROGRESS NOTE ADULT - ATTENDING COMMENTS
Patient seen and examined this morning. Discussed with PGy1 Dr. Tobar    51 year old male with PMH CVA (10 years ago, no deficits), HTN, DM, CHF (EF 30-35% 2/21), non compliant with medication regimen and Anxiety state presented with right foot swelling. Patient stated two weeks ago he noticed bleeding in his shoes and found a staple stuck to the bottom of his feet. He didn't notice it before because he states he doesn't have any sensation in his feet. He went to urgent care, where they prescribed him Keflex and due to progressive worsening leg swelling send from Urgent Care to ED. Patient admitted with Cellulitis and Abscess found to have osteomyelitis to the second and third toe s/p amputation now awaiting delayed wound closure.    Patient doing well in no acute distress. Notified of being very anxious yesterday and was given lorazepam. Yesterday he flet nauseous after Entresto and now he says he gets fatigue with it. Pain well controlled; Eating well; Had BM after Laxatives yesterday.   denies fever, chills, SOB, palpitations, chest pain, nausea, vomiting, diarrhea, constipation, dizziness    Vital Signs Last 24 Hrs  T(C): 36.3 (24 Oct 2021 13:50), Max: 36.7 (23 Oct 2021 20:33)  T(F): 97.4 (24 Oct 2021 13:50), Max: 98.1 (23 Oct 2021 20:33)  HR: 90 (24 Oct 2021 13:50) (86 - 98)  BP: 111/71 (24 Oct 2021 13:50) (101/70 - 119/76)  BP(mean): 79 (24 Oct 2021 13:50) (79 - 79)  RR: 16 (24 Oct 2021 13:50) (16 - 18)  SpO2: 100% (24 Oct 2021 13:50) (98% - 100%)    P/E: As above  middle aged male, comfortable in bed, NAD  Neck: No JVD, no thyromegaly  Mood improved  Neuro: AAO x3; No  gross focal deficits  CVS: S1S2 present, regular  Resp: BLAE+, No wheeze or Rhonchi  GI: Soft, BS+, NT  Extr: No edema or calf tenderness Left leg  Right foot dressing intact with wound vac in place;    Labs: Reviewed                                 10.1   9.09  )-----------( 331      ( 24 Oct 2021 12:37 )             30.6   10-24    135  |  100  |  17  ----------------------------<  208<H>  4.3   |  30  |  1.22    Ca    8.7      24 Oct 2021 12:37    TPro  7.6  /  Alb  2.4<L>  /  TBili  0.4  /  DBili  x   /  AST  14  /  ALT  23  /  AlkPhos  60  10-24    vVancomycin Level, Trough (10.23.21 @ 17:56) Vancomycin Level, Trough: 13.0:    A/P:  Cellulitis of the Right foot with abscess and Osteomyelitis second and third digit s/p amputation  PVD   DM with likely Neuropathy sugars controlled  Chronic systolic CHF stable  Severe Pulmonary HTN  CEDRIC improved   HTN stable  Non compliance with medication regimen  ?? Depressed mood    Plan:  MRI with OM, Cont Vancomycin and Cefepime s/p amputation of right 2nd and 3rd toe amputation  Vanco trough therapeutic; ID follow up for duration of antibiotics  l follow up Bone biopsy from proximal margin of amputation site  Podiatry follow up noted; Planned for delayed closure of the wound; d/w Resident scheduled for Tuesday 2 PM  Continue  Oxycodone PRN q 6hrs for better pain control  MIGDALIA/PVR showed non compressible vessels. Vascular surgery on board. CT Angio with mild occlusion without intervention; may benefit form antiplatelet therapy once podiatrically stable; Continue Aspirin for now  Spoke with Patient and advised that we have counseled him daily on adherence to medications for HF; If he is still unsure, choose his options.   He was strongly encouraged to at least take medications for his Heart failure which can improve his cardiac function.   Diet and Exercise counseled to improve BP and sugar control  DVT ppx  LABS STABLE TODAY; HOLD LABS TOMORROW; REPEAT LABS TUESDAY PRIOR TO OR.     Discussed with PGY1 Dr. Tobar  Discussed with Patient and updated plan as above including OR Tuesday and RN

## 2021-10-25 ENCOUNTER — TRANSCRIPTION ENCOUNTER (OUTPATIENT)
Age: 51
End: 2021-10-25

## 2021-10-25 LAB
CULTURE RESULTS: SIGNIFICANT CHANGE UP
CULTURE RESULTS: SIGNIFICANT CHANGE UP
GLUCOSE BLDC GLUCOMTR-MCNC: 156 MG/DL — HIGH (ref 70–99)
GLUCOSE BLDC GLUCOMTR-MCNC: 167 MG/DL — HIGH (ref 70–99)
GLUCOSE BLDC GLUCOMTR-MCNC: 179 MG/DL — HIGH (ref 70–99)
GLUCOSE BLDC GLUCOMTR-MCNC: 189 MG/DL — HIGH (ref 70–99)
ORGANISM # SPEC MICROSCOPIC CNT: SIGNIFICANT CHANGE UP
SARS-COV-2 RNA SPEC QL NAA+PROBE: SIGNIFICANT CHANGE UP
SPECIMEN SOURCE: SIGNIFICANT CHANGE UP
SPECIMEN SOURCE: SIGNIFICANT CHANGE UP
VANCOMYCIN TROUGH SERPL-MCNC: 13.4 UG/ML — SIGNIFICANT CHANGE UP (ref 10–20)

## 2021-10-25 PROCEDURE — 99233 SBSQ HOSP IP/OBS HIGH 50: CPT | Mod: GC

## 2021-10-25 RX ADMIN — MEROPENEM 100 MILLIGRAM(S): 1 INJECTION INTRAVENOUS at 21:47

## 2021-10-25 RX ADMIN — MEROPENEM 100 MILLIGRAM(S): 1 INJECTION INTRAVENOUS at 05:43

## 2021-10-25 RX ADMIN — Medication 250 MILLIGRAM(S): at 17:45

## 2021-10-25 RX ADMIN — Medication 250 MILLIGRAM(S): at 05:43

## 2021-10-25 RX ADMIN — MEROPENEM 100 MILLIGRAM(S): 1 INJECTION INTRAVENOUS at 15:54

## 2021-10-25 RX ADMIN — CHLORHEXIDINE GLUCONATE 1 APPLICATION(S): 213 SOLUTION TOPICAL at 05:43

## 2021-10-25 RX ADMIN — POLYETHYLENE GLYCOL 3350 17 GRAM(S): 17 POWDER, FOR SOLUTION ORAL at 12:37

## 2021-10-25 NOTE — PROGRESS NOTE ADULT - SUBJECTIVE AND OBJECTIVE BOX
Podiatry Interval HPI: Pt seen resting in bed s/p 2nd and 3rd toe amputation R foot 10/20. Pt states that there is still mild pain to surgical site. Pt denies any acute overnight events. Pt denies any constitutional symptoms of N/V/C/F/Sob.     Podiatry HPI: Podiatry consulted regarding a 50 yo male who presents to the ED for a right foot infection. Pt states that he stepped on a brass staple about 2 weeks ago and had no idea it punctured through the skin until the next morning when he started to feel pain. Pt states that he is diabetic so his sensation in his feet is diminished. Pt reports that he went to the urgent care and was prescribed keflex which he finished. He was also told by the urgent care to soak the foot in epsom salt and apply bacitracin cream on it. Pt reports that soaking it made it worse and turned into a infection a few days ago. Pt is complaining 6/10 pain on the right foot. Admits to having chills. Pt denies constitutional symptoms of N/V/C/F/Sob.     HPI: Patient is a 50 y/o male who presents with worsening right foot pain and wound x2 weeks for which he was prescribed keflex course which he completed and a clindamycin course which he just started. Patient has PMH significant for DM, CHF. Patient reports that a "contractor grade staple" punctured his foot and he didn't realize until the next morning when he couldn't bear weight. He reports he is up to date on his tetanus shot. He reports chills but denies fever as he "did not check it" and ascending leg pain at times. Denies shortness of breath, chest pain or pressure, nausea or vomiting.         Medications ALPRAZolam 0.5 milliGRAM(s) Oral two times a day PRN  aspirin enteric coated 81 milliGRAM(s) Oral daily  atorvastatin 80 milliGRAM(s) Oral at bedtime  chlorhexidine 2% Cloths 1 Application(s) Topical <User Schedule>  enoxaparin Injectable 40 milliGRAM(s) SubCutaneous daily  influenza   Vaccine 0.5 milliLiter(s) IntraMuscular once  insulin lispro (ADMELOG) corrective regimen sliding scale   SubCutaneous Before meals and at bedtime  insulin lispro Injectable (ADMELOG) 2 Unit(s) SubCutaneous three times a day before meals  melatonin 3 milliGRAM(s) Oral at bedtime  meropenem  IVPB 1000 milliGRAM(s) IV Intermittent every 8 hours  ondansetron Injectable 4 milliGRAM(s) IV Push every 6 hours PRN  polyethylene glycol 3350 17 Gram(s) Oral daily  sacubitril 24 mG/valsartan 26 mG 1 Tablet(s) Oral two times a day  senna 2 Tablet(s) Oral at bedtime  vancomycin  IVPB 1000 milliGRAM(s) IV Intermittent every 12 hours    FHNo pertinent family history    ,   PMHCVA (cerebral vascular accident)    HTN (hypertension)    Anxiety    DM (diabetes mellitus)    Chronic CHF    Chronic systolic congestive heart failure       PSHNo pertinent past surgical history        Labs                          10.1   9.09  )-----------( 331      ( 24 Oct 2021 12:37 )             30.6      10-24    135  |  100  |  17  ----------------------------<  208<H>  4.3   |  30  |  1.22    Ca    8.7      24 Oct 2021 12:37    TPro  7.6  /  Alb  2.4<L>  /  TBili  0.4  /  DBili  x   /  AST  14  /  ALT  23  /  AlkPhos  60  10-24     Vital Signs Last 24 Hrs  T(C): 36.5 (25 Oct 2021 06:13), Max: 36.5 (25 Oct 2021 06:13)  T(F): 97.7 (25 Oct 2021 06:13), Max: 97.7 (25 Oct 2021 06:13)  HR: 88 (25 Oct 2021 06:13) (88 - 99)  BP: 107/72 (25 Oct 2021 06:13) (107/72 - 111/71)  BP(mean): 79 (24 Oct 2021 13:50) (79 - 79)  RR: 17 (25 Oct 2021 06:13) (16 - 17)  SpO2: 100% (25 Oct 2021 06:13) (100% - 100%)  Sedimentation Rate, Erythrocyte: 98 mm/Hr (10-21-21 @ 04:40)  Sedimentation Rate, Erythrocyte: 99 mm/Hr (10-15-21 @ 13:37)         C-Reactive Protein, Serum: 42 mg/L (10-21-21 @ 14:41)  C-Reactive Protein, Serum: 106 mg/L (10-16-21 @ 01:09)   WBC Count: 9.09 K/uL (10-24-21 @ 12:37)       PHYSICAL EXAM  GEN: CLARIBEL REICH is a pleasant well-nourished, well developed 51y Male in no acute distress, alert awake, and oriented to person, place and time.   LE Focused:    Vasc: DP/PT pulses faintly palpable, CFT < 5 seconds to digits, TG warm to cool, pitting edema present on bilateral legs  Derm: Surgical site R 2nd and 3rd digit amputation left open with R 2nd & 3rd metatarsal heads visible. No purlent drainage noted. No streaking or erythema noted. No clinical signs of infection noted to R surgical site.  Neuro: Protective sensation grossly diminished  MSK: Able to wiggles toes. R 2nd & 3rd digit amputation     10/15: s/p R foot bedside I&D: tracking noted in all directions, probes to bone, serosanguinous drainage noted       Imaging:    EXAM:  MR FOOT RT                          PROCEDURE DATE:  10/18/2021      INTERPRETATION:  EXAMINATION: MR FOOT RIGHT    CLINICAL INDICATION:Evaluate for osteomyelitis    COMPARISON: Radiographs dated 10/15/2021    TECHNIQUE: Multiplanar, multi-sequence MRI of the right foot was performed without intravenous contrast.    INTERPRETATION:    Bones and soft tissues: There is no fracture. There is patchy STIR hyperintensity within the second digit proximal phalanx with mild patchy T1 hypointensity seen on the short axis TI images. Similar finding is seen at the base of the third digit proximal phalanx. The findings are favored to represent early osteomyelitis, with reactive osteitis considered less likely.    There is extensive surrounding soft tissue edema and phlegmon at the second and third digits which may represent abscess-in -formation with blistering of the overlying skin. Multiple foci of hypointensity seen on series 6 and 7 at the second and third digits may be related to vascular atherosclerosis versus foci of soft tissue gas. Advise radiographic correlation (prior radiographs are dated 10/15/2021).    Muscles: There is extensive edema within the intrinsic muscles of the foot which may be seen in neuropathy.    IMPRESSION:  1.  Findings favored to represent osteomyelitis involving second and third digit proximal phalanges.    2.  Extensive overlying soft tissue infection involving the second and third digits with surrounding edema and phlegmon, may represent abscess-in-formation. Additionally, multiple rounded foci of hypointensity in soft tissue at the second and third digits may be related to vascular atherosclerosis versus foci of soft tissue gas. Advise radiographic correlation (prior radiographs are dated 10/15/2021).      EXAM:  XR FOOT 2 VIEWS RT                          PROCEDURE DATE:  10/15/2021        INTERPRETATION:  Cellulitis swollen right foot.    3 views right foot.    No fracture or dislocation. No focal bone lysis or unusual periosteal reaction. Joint spaces preserved. Small calcaneal osteophytes. Vascular calcification. Soft tissue swelling mostly over the dorsum of the foot may reflect cellulitis. No soft tissue gas.    IMPRESSION: No acute or destructive osseous pathology.    If there is clinical suspicion of osteomyelitis consider bone scan or MRI        EXAM:  US PHYSIOL LWR EXT 3+ LEV BI                          PROCEDURE DATE:  10/16/2021      INTERPRETATION:  Clinical indication: Ischemic toe    COMPARISON: None    FINDINGS: There are pulsatile waveforms bilaterally. Segmental pressures could not be obtained due to calcified, noncompressible vessels. Therefore, ABIs could not be calculated.    IMPRESSION: ABIs could not be calculated due to calcified, noncompressible vessels.      Culture  Specimen Source: .Surgical Swab R foot plantar wound (10.16.21 @ 02:28) Culture Results:   Few Streptococcus agalactiae (Group B) isolated   Group B streptococci are susceptible to ampicillin,   penicillin and cefazolin, but may be resistant to   erythromycin and clindamycin.   Recommendations for intrapartum prophylaxis for Group B   streptococci are penicillin or ampicillin. (10.16.21 @ 02:28)                    Podiatry Interval HPI: Pt seen resting in bed s/p 2nd and 3rd toe amputation R foot 10/20. Pt states that there is still mild pain to surgical site. Pt denies any acute overnight events. Pt denies any constitutional symptoms of N/V/C/F/Sob. Pt is amendable for surgery tomorrow afternoon. No other pedal complaints.     Podiatry HPI: Podiatry consulted regarding a 52 yo male who presents to the ED for a right foot infection. Pt states that he stepped on a brass staple about 2 weeks ago and had no idea it punctured through the skin until the next morning when he started to feel pain. Pt states that he is diabetic so his sensation in his feet is diminished. Pt reports that he went to the urgent care and was prescribed keflex which he finished. He was also told by the urgent care to soak the foot in epsom salt and apply bacitracin cream on it. Pt reports that soaking it made it worse and turned into a infection a few days ago. Pt is complaining 6/10 pain on the right foot. Admits to having chills. Pt denies constitutional symptoms of N/V/C/F/Sob.     HPI: Patient is a 50 y/o male who presents with worsening right foot pain and wound x2 weeks for which he was prescribed keflex course which he completed and a clindamycin course which he just started. Patient has PMH significant for DM, CHF. Patient reports that a "contractor grade staple" punctured his foot and he didn't realize until the next morning when he couldn't bear weight. He reports he is up to date on his tetanus shot. He reports chills but denies fever as he "did not check it" and ascending leg pain at times. Denies shortness of breath, chest pain or pressure, nausea or vomiting.         Medications ALPRAZolam 0.5 milliGRAM(s) Oral two times a day PRN  aspirin enteric coated 81 milliGRAM(s) Oral daily  atorvastatin 80 milliGRAM(s) Oral at bedtime  chlorhexidine 2% Cloths 1 Application(s) Topical <User Schedule>  enoxaparin Injectable 40 milliGRAM(s) SubCutaneous daily  influenza   Vaccine 0.5 milliLiter(s) IntraMuscular once  insulin lispro (ADMELOG) corrective regimen sliding scale   SubCutaneous Before meals and at bedtime  insulin lispro Injectable (ADMELOG) 2 Unit(s) SubCutaneous three times a day before meals  melatonin 3 milliGRAM(s) Oral at bedtime  meropenem  IVPB 1000 milliGRAM(s) IV Intermittent every 8 hours  ondansetron Injectable 4 milliGRAM(s) IV Push every 6 hours PRN  polyethylene glycol 3350 17 Gram(s) Oral daily  sacubitril 24 mG/valsartan 26 mG 1 Tablet(s) Oral two times a day  senna 2 Tablet(s) Oral at bedtime  vancomycin  IVPB 1000 milliGRAM(s) IV Intermittent every 12 hours    FHNo pertinent family history    ,   PMHCVA (cerebral vascular accident)    HTN (hypertension)    Anxiety    DM (diabetes mellitus)    Chronic CHF    Chronic systolic congestive heart failure       PSHNo pertinent past surgical history        Labs                          10.1   9.09  )-----------( 331      ( 24 Oct 2021 12:37 )             30.6      10-24    135  |  100  |  17  ----------------------------<  208<H>  4.3   |  30  |  1.22    Ca    8.7      24 Oct 2021 12:37    TPro  7.6  /  Alb  2.4<L>  /  TBili  0.4  /  DBili  x   /  AST  14  /  ALT  23  /  AlkPhos  60  10-24     Vital Signs Last 24 Hrs  T(C): 36.5 (25 Oct 2021 06:13), Max: 36.5 (25 Oct 2021 06:13)  T(F): 97.7 (25 Oct 2021 06:13), Max: 97.7 (25 Oct 2021 06:13)  HR: 88 (25 Oct 2021 06:13) (88 - 99)  BP: 107/72 (25 Oct 2021 06:13) (107/72 - 111/71)  BP(mean): 79 (24 Oct 2021 13:50) (79 - 79)  RR: 17 (25 Oct 2021 06:13) (16 - 17)  SpO2: 100% (25 Oct 2021 06:13) (100% - 100%)  Sedimentation Rate, Erythrocyte: 98 mm/Hr (10-21-21 @ 04:40)  Sedimentation Rate, Erythrocyte: 99 mm/Hr (10-15-21 @ 13:37)         C-Reactive Protein, Serum: 42 mg/L (10-21-21 @ 14:41)  C-Reactive Protein, Serum: 106 mg/L (10-16-21 @ 01:09)   WBC Count: 9.09 K/uL (10-24-21 @ 12:37)       PHYSICAL EXAM  GEN: CLARIBEL REICH is a pleasant well-nourished, well developed 51y Male in no acute distress, alert awake, and oriented to person, place and time.   LE Focused:    Vasc: DP/PT pulses faintly palpable, CFT < 5 seconds to digits, TG warm to cool, pitting edema present on bilateral legs  Derm: Surgical site R 2nd and 3rd digit amputation left open with R 2nd & 3rd metatarsal heads visible. No purlent drainage noted. No streaking or erythema noted. No clinical signs of infection noted to R surgical site.  Neuro: Protective sensation grossly diminished  MSK: Able to wiggles toes. R 2nd & 3rd digit amputation     10/15: s/p R foot bedside I&D: tracking noted in all directions, probes to bone, serosanguinous drainage noted       Imaging:    EXAM:  MR FOOT RT                          PROCEDURE DATE:  10/18/2021      INTERPRETATION:  EXAMINATION: MR FOOT RIGHT    CLINICAL INDICATION:Evaluate for osteomyelitis    COMPARISON: Radiographs dated 10/15/2021    TECHNIQUE: Multiplanar, multi-sequence MRI of the right foot was performed without intravenous contrast.    INTERPRETATION:    Bones and soft tissues: There is no fracture. There is patchy STIR hyperintensity within the second digit proximal phalanx with mild patchy T1 hypointensity seen on the short axis TI images. Similar finding is seen at the base of the third digit proximal phalanx. The findings are favored to represent early osteomyelitis, with reactive osteitis considered less likely.    There is extensive surrounding soft tissue edema and phlegmon at the second and third digits which may represent abscess-in -formation with blistering of the overlying skin. Multiple foci of hypointensity seen on series 6 and 7 at the second and third digits may be related to vascular atherosclerosis versus foci of soft tissue gas. Advise radiographic correlation (prior radiographs are dated 10/15/2021).    Muscles: There is extensive edema within the intrinsic muscles of the foot which may be seen in neuropathy.    IMPRESSION:  1.  Findings favored to represent osteomyelitis involving second and third digit proximal phalanges.    2.  Extensive overlying soft tissue infection involving the second and third digits with surrounding edema and phlegmon, may represent abscess-in-formation. Additionally, multiple rounded foci of hypointensity in soft tissue at the second and third digits may be related to vascular atherosclerosis versus foci of soft tissue gas. Advise radiographic correlation (prior radiographs are dated 10/15/2021).      EXAM:  XR FOOT 2 VIEWS RT                          PROCEDURE DATE:  10/15/2021        INTERPRETATION:  Cellulitis swollen right foot.    3 views right foot.    No fracture or dislocation. No focal bone lysis or unusual periosteal reaction. Joint spaces preserved. Small calcaneal osteophytes. Vascular calcification. Soft tissue swelling mostly over the dorsum of the foot may reflect cellulitis. No soft tissue gas.    IMPRESSION: No acute or destructive osseous pathology.    If there is clinical suspicion of osteomyelitis consider bone scan or MRI        EXAM:  US PHYSIOL LWR EXT 3+ LEV BI                          PROCEDURE DATE:  10/16/2021      INTERPRETATION:  Clinical indication: Ischemic toe    COMPARISON: None    FINDINGS: There are pulsatile waveforms bilaterally. Segmental pressures could not be obtained due to calcified, noncompressible vessels. Therefore, ABIs could not be calculated.    IMPRESSION: ABIs could not be calculated due to calcified, noncompressible vessels.      Culture  Specimen Source: .Surgical Swab R foot plantar wound (10.16.21 @ 02:28) Culture Results:   Few Streptococcus agalactiae (Group B) isolated   Group B streptococci are susceptible to ampicillin,   penicillin and cefazolin, but may be resistant to   erythromycin and clindamycin.   Recommendations for intrapartum prophylaxis for Group B   streptococci are penicillin or ampicillin. (10.16.21 @ 02:28)

## 2021-10-25 NOTE — PROGRESS NOTE ADULT - PROBLEM SELECTOR PLAN 4
Patient noncompliant w/ meds   On Lisinopril 2.5mg at home  monitor BP   start entresto - noncomplaint  encourage medication compliance   DASH diet

## 2021-10-25 NOTE — PROGRESS NOTE ADULT - ASSESSMENT
Right foot and leg cellulitis  Osteomyelitis of right foot - s/p amputation of 2nd and 3rd toes    Plan:  ·	Continue Vancomycin 1000mgs iv q12hrs   ·	Continue meropenem 1gm IV q8h for now  ·	awaiting pathology results        Right foot and leg cellulitis  Osteomyelitis of right foot - s/p amputation of 2nd and 3rd toes    Plan:  ·	Continue Vancomycin 1000mgs iv q12hrs   ·	Continue meropenem 1gm IV q8h for now  ·	awaiting pathology results   ·	OR tomorrow for primary closure       Right foot and leg cellulitis  Osteomyelitis of right foot - s/p amputation of 2nd and 3rd toes    Plan:  ·	Continue Vancomycin 1000mgs iv q12hrs   ·	Continue meropenem 1gm IV q8h for now  ·	awaiting pathology results   ·	OR tomorrow for secondary closure    I agree with above

## 2021-10-25 NOTE — PROGRESS NOTE ADULT - SUBJECTIVE AND OBJECTIVE BOX
PGY-1 Progress Note discussed with attending    PAGER #: [[phone]] TILL 5:00 PM  PLEASE CONTACT ON CALL TEAM:  - On Call Team (Please refer to Quintin) FROM 5:00 PM - 8:30PM  - Nightfloat Team FROM 8:30 -7:30 AM    CHIEF COMPLAINT & BRIEF HOSPITAL COURSE:  51 year old male with PMH CVA (10 years ago, no deficits), HTN, DM, CHF (EF 30-35% 2/21), presented with right foot swelling. Patient stated two weeks ago he noticed bleeding in his shoes and found a staple stuck to the bottom of his feet. He didn't notice it before because he states he doesn't have any sensation in his feet. He went to urgent care, where they prescribed him Keflex Q8hrs. Patient states the foot started to swell more so he returned to urgent care yesterday where they gave him a prescription of clindamycin. Patient this morning started to endorse chills, nausea, but denies any fever, vomiting, chest pain, SOB, abdominal pain, or changes in bowel habits.  Patient was admitted in February due to covid, and was found to have CHF and DM. Patient has not been complaint with any of his medications and states he just takes Lasix and Xanax. MRI 10/18 shows proximal OM of 2nd and 3rd digit of R. Foot. Angiogram 10/19 shows Inline flow to R foot via dominant PT and smaller peroneal with AT occlusion. Pt. had amputation of the 2nd and 3rd digit on the R. foot on 10/20.    INTERVAL HPI/OVERNIGHT EVENTS:   Pt. s/p 2nd and 3rd digit amputation of R. Foot POD#5. Pt continues to be non-compliant with CHF medicines due to side effects. Does not want to try other options. Foot pain has been constant.    MEDICATIONS  (STANDING):  aspirin enteric coated 81 milliGRAM(s) Oral daily  atorvastatin 80 milliGRAM(s) Oral at bedtime  chlorhexidine 2% Cloths 1 Application(s) Topical <User Schedule>  enoxaparin Injectable 40 milliGRAM(s) SubCutaneous daily  influenza   Vaccine 0.5 milliLiter(s) IntraMuscular once  insulin lispro (ADMELOG) corrective regimen sliding scale   SubCutaneous Before meals and at bedtime  insulin lispro Injectable (ADMELOG) 2 Unit(s) SubCutaneous three times a day before meals  melatonin 3 milliGRAM(s) Oral at bedtime  meropenem  IVPB 1000 milliGRAM(s) IV Intermittent every 8 hours  polyethylene glycol 3350 17 Gram(s) Oral daily  sacubitril 24 mG/valsartan 26 mG 1 Tablet(s) Oral two times a day  senna 2 Tablet(s) Oral at bedtime  vancomycin  IVPB 1000 milliGRAM(s) IV Intermittent every 12 hours    MEDICATIONS  (PRN):  ALPRAZolam 0.5 milliGRAM(s) Oral two times a day PRN Anxiety  ondansetron Injectable 4 milliGRAM(s) IV Push every 6 hours PRN Nausea and/or Vomiting    REVIEW OF SYSTEMS:  CONSTITUTIONAL: No fever, chills, weight loss, or fatigue  RESPIRATORY: No dry cough, Denies wheezing, chills or hemoptysis; No shortness of breath  CARDIOVASCULAR: No chest pain, palpitations, dizziness, or leg swelling  GASTROINTESTINAL: No abdominal pain. No vomiting, or hematemesis; No diarrhea or constipation. No melena or hematochezia.  GENITOURINARY: No dysuria or hematuria, urinary frequency  NEUROLOGICAL: No headaches, memory loss, loss of strength  MSK: Right foot pain, dull 6/10  SKIN: Slightly bruising and discoloration on R anterior leg    Vital Signs Last 24 Hrs  T(C): 36.5 (25 Oct 2021 06:13), Max: 36.5 (25 Oct 2021 06:13)  T(F): 97.7 (25 Oct 2021 06:13), Max: 97.7 (25 Oct 2021 06:13)  HR: 88 (25 Oct 2021 06:13) (88 - 99)  BP: 107/72 (25 Oct 2021 06:13) (107/72 - 111/71)  BP(mean): 79 (24 Oct 2021 13:50) (79 - 79)  RR: 17 (25 Oct 2021 06:13) (16 - 17)  SpO2: 100% (25 Oct 2021 06:13) (100% - 100%)    PHYSICAL EXAMINATION:  GENERAL: NAD, well built  HEAD:  Atraumatic, Normocephalic  EYES:  conjunctiva and sclera clear  NECK: Supple, No JVD, Normal thyroid  CHEST/LUNG: no crackles in b/l lung bases. No rales, rhonchi, wheezing, or rubs  HEART: Regular rate and rhythm; No murmurs, rubs, or gallops  ABDOMEN: Soft, Nontender, Nondistended; Bowel sounds present  NERVOUS SYSTEM:  Alert & Oriented X3  EXTREMITIES:  1+ pitting edema b/l 2+ Peripheral Pulses, No clubbing, cyanosis  SKIN: s/p R. Foot 2nd and 3rd digit amputation, wound dressed.                                            10.1   9.09  )-----------( 331      ( 24 Oct 2021 12:37 )             30.6       135  |  100  |  17  ----------------------------<  208<H>  4.3   |  30  |  1.22    Ca    8.7      24 Oct 2021 12:37    TPro  7.6  /  Alb  2.4<L>  /  TBili  0.4  /  DBili  x   /  AST  14  /  ALT  23  /  AlkPhos  60  10-24      Vancomycin Level, Trough: 13.4      CAPILLARY BLOOD GLUCOSE    POCT Blood Glucose.: 167 mg/dL (25 Oct 2021 07:49)  POCT Blood Glucose.: 208 mg/dL (24 Oct 2021 21:26)  POCT Blood Glucose.: 165 mg/dL (24 Oct 2021 16:26)  POCT Blood Glucose.: 224 mg/dL (24 Oct 2021 11:42)      RADIOLOGY & ADDITIONAL TESTS:    EXAM:  US PHYSIOL LWR EXT 3+ LEV BI                        PROCEDURE DATE:  10/16/2021    INTERPRETATION:  Clinical indication: Ischemic toe  COMPARISON: None  FINDINGS: There are pulsatile waveforms bilaterally. Segmental pressures could not be obtained due to calcified, noncompressible vessels. Therefore, ABIs could not be calculated.  IMPRESSION: ABIs could not be calculated due to calcified, noncompressible vessels.    ------------------------------------------------------------------------    EXAM:  XR FOOT 2 VIEWS RT                        PROCEDURE DATE:  10/15/2021    INTERPRETATION:  Cellulitis swollen right foot.  3 views right foot.  No fracture or dislocation. No focal bone lysis or unusual periosteal reaction. Joint spaces preserved. Small calcaneal osteophytes. Vascular calcification. Soft tissue swelling mostly over the dorsum of the foot may reflect cellulitis. No soft tissue gas.  IMPRESSION: No acute or destructive osseous pathology.  If there is clinical suspicion of osteomyelitis consider bone scan or MRI    ------------------------------------------------------------------------    EXAM:  MR FOOT RT                        PROCEDURE DATE:  10/18/2021    INTERPRETATION:  EXAMINATION: MR FOOT RIGHT  CLINICAL INDICATION: Evaluate for osteomyelitis  COMPARISON: Radiographs dated 10/15/2021  TECHNIQUE: Multiplanar, multi-sequence MRI of the right foot was performed without intravenous contrast.  INTERPRETATION:  Bones and soft tissues: There is no fracture. There is patchy STIR hyperintensity within the second digit proximal phalanx with mild patchy T1 hypointensity seen on the short axis TI images. Similar finding is seen at the base of the third digit proximal phalanx. The findings are favored to represent early osteomyelitis, with reactive osteitis considered less likely.  There is extensive surrounding soft tissue edema and phlegmon at the second and third digits which may represent abscess-in -formation with blistering of the overlying skin. Multiple foci of hypointensity seen on series 6 and 7 at the second and third digits may be related to vascular atherosclerosis versus foci of soft tissue gas. Advise radiographic correlation (prior radiographs are dated 10/15/2021).  Muscles: There is extensive edema within the intrinsic muscles of the foot which may be seen in neuropathy.  IMPRESSION:  1.  Findings favored to represent osteomyelitis involving second and third digit proximal phalanges.  2.  Extensive overlying soft tissue infection involving the second and third digits with surrounding edema and phlegmon, may represent abscess-in-formation. Additionally, multiple rounded foci of hypointensity in soft tissue at the second and third digits may be related to vascular atherosclerosis versus foci of soft tissue gas. Advise radiographic correlation (prior radiographs are dated 10/15/2021).    ------------------------------------------------------------------------    PROCEDURE: Transthoracic echocardiogram with 2-D, M-Mode  and complete spectral and color flow Doppler.  Study Date: 10/19/2021  INDICATION: Cardiomyopathy, unspecified (I42.9)  HISTORY:    DIMENSIONS:  Dimensions:     Normal Values:  LA:     4.9 cm    2.0 - 4.0 cm  Ao:     3.9 cm    2.0 - 3.8 cm  SEPTUM: 0.9 cm    0.6 - 1.2 cm  PWT:    1.0 cm    0.6 - 1.1 cm  LVIDd:  5.9 cm    3.0 - 5.6 cm  LVIDs:  5.9 cm    1.8 - 4.0 cm    Derived Variables:  LVMI: 101 g/m2  RWT: 0.33  Ejection Fraction Visual Estimate: 30-35 %  Ejection Fraction Stevens: 33 %    OBSERVATIONS:  Mitral Valve: Normal mitral valve. Mild mitral  regurgitation.  Aortic Root: Aortic Root: 3.9 cm.    Aortic Valve: Calcified trileaflet aortic valve with normal  opening. Mild aortic regurgitation.  Left Atrium: Normal left atrium.  LA volume index = 31  cc/m2.  Left Ventricle: Dilated lv with severe segmental left  ventricular systolic dysfunction.The distal  septum,apex,inferiorand infero-lateral walls are  hypokinetic EF=30-35%. Normal left ventricular internal  dimensions and wall thicknesses. Grade I diastolic  dysfunction (Impaired relaxation, mild).  Right Heart: Normal right atrium. Normal right ventricular  size and systolic function (TAPSE  1.7cm). There is mild  tricuspid regurgitation. Normal pulmonic valve.  Pericardium/PleuraNormal pericardium with no pericardial  effusion.  Hemodynamic: RV systolic pressure is severely increased at  65 mm Hg.    CONCLUSIONS:  1. Normal mitral valve. Mild mitral regurgitation.  2. Calcified trileaflet aortic valve with normal opening.  Mild aortic regurgitation.  3. Aortic Root: 3.9 cm.    4. Normal left atrium.  LA volume index = 31 cc/m2.  5. Normal left ventricular internal dimensions and wall  thicknesses.  6. Dilated lv with severe segmental left ventricular  systolic dysfunction.The distal septum,apex,inferiorand  infero-lateral walls are hypokinetic EF=30-35%.  7. Grade I diastolic dysfunction (Impaired relaxation,  mild).  8. Normal right atrium.  9. Normal right ventricular size and systolic function  (TAPSE  1.7cm).  10. RV systolic pressure is severely increased at  65 mm  Hg.  11. There is mild tricuspid regurgitation.  12. Normal pulmonic valve.  13. Normal pericardium with no pericardial effusion.    ------------------------------------------------------------------------    EXAM:  XR FOOT COMP MIN 3 VIEWS RT                        PROCEDURE DATE:  10/20/2021    INTERPRETATION:  Right foot  HISTORY: Postop  COMPARISON: 10/15/2021   Two views of the right foot show amputation of the second and third toes. Wound VAC device is present. The joint spaces are maintained.  IMPRESSION: Postoperative changes.

## 2021-10-25 NOTE — PROGRESS NOTE ADULT - PROBLEM SELECTOR PLAN 2
Not compliant with medications  Primary cardiologist Dr. Fairchild  Echo ordered 10/19 showing EF of 30-35%; GIDD  Held Lasix dose in 10/19 will resume after angiogram  Cardio consult 10/21 recommend to start entresto 26-24 BID  Pt. non-compliant to resume entresto after first dose, c/o fatigue + pain  fluid restrictions; strict I/O; daily weight   encourage medication compliance

## 2021-10-25 NOTE — PROGRESS NOTE ADULT - PROBLEM SELECTOR PLAN 1
Cont Vancomycin and Meropenem (Cefepime discontinued due to Tissue Cx. E. Coli ESBL 10/24)  Vanc Trough 10/25 - 13.4  Dr Mesa - ID consulted  Cx: Few Streptococcus agalactiae (Group B) isolated sensitive to Ampicillin and Penicillin  MRI:  Findings favored to represent osteomyelitis involving second and third digit proximal phalanges.  Extensive overlying soft tissue infection involving the second and third digits with surrounding edema and phlegmon, may represent abscess-in-formation. Additionally, multiple rounded foci of hypointensity in soft tissue at the second and third digits may be related to vascular atherosclerosis versus foci of soft tissue gas.  MIGDALIA/PVR shows noncompressible vessels - may benefit form antiplatelet therapy once podiatrically stable  Vascular consulted - Angiogram shows Inline flow to R foot via dominant PT and smaller peroneal with AT occlusion  Podiatry consulted - pt. had 2nd and 3rd digit of R. Foot amputated on 10/20  c/w Abx as per ID  Pain Management: oxycodone 5 mG/acetaminophen 325 mG PRN q6h  Podiatry - Wound closure planned for 2PM Tuesday 10/26

## 2021-10-25 NOTE — PROGRESS NOTE ADULT - SUBJECTIVE AND OBJECTIVE BOX
51y Male is under our care for     REVIEW OF SYSTEMS:  [  ] Not able to elicit  General:	  Chest:	  GI:	  :  Skin:	  Musculoskeletal:	  Neuro:	    MEDS:  meropenem  IVPB 1000 milliGRAM(s) IV Intermittent every 8 hours  vancomycin  IVPB 1000 milliGRAM(s) IV Intermittent every 12 hours    ALLERGIES: Allergies    Nuts (Unknown)  tetracycline (Hives)    Intolerances        VITALS:  Vital Signs Last 24 Hrs  T(C): 36.5 (25 Oct 2021 06:13), Max: 36.5 (25 Oct 2021 06:13)  T(F): 97.7 (25 Oct 2021 06:13), Max: 97.7 (25 Oct 2021 06:13)  HR: 88 (25 Oct 2021 06:13) (88 - 99)  BP: 107/72 (25 Oct 2021 06:13) (107/72 - 111/71)  BP(mean): 79 (24 Oct 2021 13:50) (79 - 79)  RR: 17 (25 Oct 2021 06:13) (16 - 17)  SpO2: 100% (25 Oct 2021 06:13) (100% - 100%)      PHYSICAL EXAM:  HEENT:  Neck:  Respiratory:  Cardiovascular:  Gastrointestinal:  Extremities:  Skin:  Ortho:  Neuro:    LABS/DIAGNOSTIC TESTS:                        10.1   9.09  )-----------( 331      ( 24 Oct 2021 12:37 )             30.6     WBC Count: 9.09 K/uL (10-24 @ 12:37)  WBC Count: 10.73 K/uL (10-22 @ 07:55)  WBC Count: 10.61 K/uL (10-21 @ 04:40)  WBC Count: 8.16 K/uL (10-20 @ 10:43)    10-24    135  |  100  |  17  ----------------------------<  208<H>  4.3   |  30  |  1.22    Ca    8.7      24 Oct 2021 12:37    TPro  7.6  /  Alb  2.4<L>  /  TBili  0.4  /  DBili  x   /  AST  14  /  ALT  23  /  AlkPhos  60  10-24      CULTURES:   .Tissue right 3rd metatarsal  10-20 @ 21:21   Rare Escherichia coli ESBL  --  Escherichia coli ESBL      .Tissue right 2nd metatarsal  10-20 @ 21:20   Few Escherichia coli ESBL  --  Escherichia coli ESBL      .Surgical Swab R foot plantar wound  10-16 @ 02:28   Numerous Finegoldia magna "Susceptibilities not performed"  Few Staphylococcus aureus  Few Streptococcus agalactiae (Group B) isolated  Group B streptococci are susceptible to ampicillin,  penicillin and cefazolin, but may be resistant to  erythromycin and clindamycin.  Recommendations for intrapartum prophylaxis for Group B  streptococci are penicillin or ampicillin.  --  Staphylococcus aureus        RADIOLOGY:  no new studies 51y Male is under our care for right foot osteomyelitis.  Patient was seen sitting comfortably in the chair with no acute distress.  He reports mild right foot pain, remains afebrile and WBC count is WNL.  Patients wound vac was disconnected this morning as he will be going to the OR tomorrow for primary closure.    REVIEW OF SYSTEMS:  [  ] Not able to elicit  General: no fevers no malaise  Chest: no cough no sob  GI: no nvd  : no urinary sxs   Skin: no rashes  Musculoskeletal: mild right foot pain  Neuro: no ha's no dizziness     MEDS:  meropenem  IVPB 1000 milliGRAM(s) IV Intermittent every 8 hours  vancomycin  IVPB 1000 milliGRAM(s) IV Intermittent every 12 hours    ALLERGIES: Allergies    Nuts (Unknown)  tetracycline (Hives)    Intolerances        VITALS:  Vital Signs Last 24 Hrs  T(C): 36.5 (25 Oct 2021 06:13), Max: 36.5 (25 Oct 2021 06:13)  T(F): 97.7 (25 Oct 2021 06:13), Max: 97.7 (25 Oct 2021 06:13)  HR: 88 (25 Oct 2021 06:13) (88 - 99)  BP: 107/72 (25 Oct 2021 06:13) (107/72 - 111/71)  BP(mean): 79 (24 Oct 2021 13:50) (79 - 79)  RR: 17 (25 Oct 2021 06:13) (16 - 17)  SpO2: 100% (25 Oct 2021 06:13) (100% - 100%)      PHYSICAL EXAM:  HEENT: n/a  Neck: supple no LN's   Respiratory: lungs clear no rales  Cardiovascular: S1 S2 reg no murmurs  Gastrointestinal: +BS with soft, nondistended abdomen; nontender  Extremities: no edema, right foot dressing dry and intact  Skin: no rashes  Ortho: n/a  Neuro: AAO x 4      LABS/DIAGNOSTIC TESTS:                        10.1   9.09  )-----------( 331      ( 24 Oct 2021 12:37 )             30.6     WBC Count: 9.09 K/uL (10-24 @ 12:37)  WBC Count: 10.73 K/uL (10-22 @ 07:55)  WBC Count: 10.61 K/uL (10-21 @ 04:40)  WBC Count: 8.16 K/uL (10-20 @ 10:43)    10-24    135  |  100  |  17  ----------------------------<  208<H>  4.3   |  30  |  1.22    Ca    8.7      24 Oct 2021 12:37    TPro  7.6  /  Alb  2.4<L>  /  TBili  0.4  /  DBili  x   /  AST  14  /  ALT  23  /  AlkPhos  60  10-24      CULTURES:   .Tissue right 3rd metatarsal  10-20 @ 21:21   Rare Escherichia coli ESBL  --  Escherichia coli ESBL      .Tissue right 2nd metatarsal  10-20 @ 21:20   Few Escherichia coli ESBL  --  Escherichia coli ESBL      .Surgical Swab R foot plantar wound  10-16 @ 02:28   Numerous Finegoldia magna "Susceptibilities not performed"  Few Staphylococcus aureus  Few Streptococcus agalactiae (Group B) isolated  Group B streptococci are susceptible to ampicillin,  penicillin and cefazolin, but may be resistant to  erythromycin and clindamycin.  Recommendations for intrapartum prophylaxis for Group B  streptococci are penicillin or ampicillin.  --  Staphylococcus aureus        RADIOLOGY:  no new studies

## 2021-10-25 NOTE — PROGRESS NOTE ADULT - ATTENDING COMMENTS
Patient seen and examined this morning. Discussed with PGy1 Dr. Tobar    51 year old male with PMH CVA (10 years ago, no deficits), HTN, DM, CHF (EF 30-35% 2/21), non compliant with medication regimen and Anxiety state presented with right foot swelling. Patient stated two weeks ago he noticed bleeding in his shoes and found a staple stuck to the bottom of his feet. He didn't notice it before because he states he doesn't have any sensation in his feet. He went to urgent care, where they prescribed him Keflex and due to progressive worsening leg swelling send from Urgent Care to ED. Patient admitted with Cellulitis and Abscess found to have osteomyelitis to the second and third toe s/p amputation now awaiting delayed wound closure.    Patient doing well in no acute distress. Notified of being very anxious yesterday and was given lorazepam. Yesterday he flet nauseous after Entresto and now he says he gets fatigue with it. Pain well controlled; Eating well; Had BM after Laxatives yesterday.   denies fever, chills, SOB, palpitations, chest pain, nausea, vomiting, diarrhea, constipation, dizziness    Vital Signs Last 24 Hrs  T(C): 36.4 (25 Oct 2021 13:30), Max: 36.5 (25 Oct 2021 06:13)  T(F): 97.6 (25 Oct 2021 13:30), Max: 97.7 (25 Oct 2021 06:13)  HR: 94 (25 Oct 2021 13:30) (88 - 99)  BP: 126/75 (25 Oct 2021 13:30) (107/72 - 126/75)  BP(mean): 87 (25 Oct 2021 13:30) (87 - 87)  RR: 16 (25 Oct 2021 13:30) (16 - 17)  SpO2: 100% (25 Oct 2021 13:30) (100% - 100%)    P/E: As above  middle aged male, comfortable in bed, NAD  Neck: No JVD, no thyromegaly  Mood improved  Neuro: AAO x3; No  gross focal deficits  CVS: S1S2 present, regular  Resp: BLAE+, No wheeze or Rhonchi  GI: Soft, BS+, NT  Extr: No edema or calf tenderness Left leg  Right foot dressing intact with wound vac in place;    Labs: Reviewed                                  10.1   9.09  )-----------( 331      ( 24 Oct 2021 12:37 )             30.6     10-24    135  |  100  |  17  ----------------------------<  208<H>  4.3   |  30  |  1.22    Ca    8.7      24 Oct 2021 12:37    TPro  7.6  /  Alb  2.4<L>  /  TBili  0.4  /  DBili  x   /  AST  14  /  ALT  23  /  AlkPhos  60  10-24    Vancomycin Level, Trough (10.25.21 @ 04:18)  Vancomycin Level, Trough: 13.4: V      A/P:  Cellulitis of the Right foot with abscess and Osteomyelitis second and third digit s/p amputation  PVD   DM with likely Neuropathy sugars controlled  Chronic systolic CHF stable  Severe Pulmonary HTN  CEDRIC improved   HTN stable  Non compliance with medication regimen  ?? Depressed mood    Plan:  MRI with OM, Cont Vancomycin and Cefepime s/p amputation of right 2nd and 3rd toe amputation  Vanco trough therapeutic; ID follow up for duration of antibiotics; d/w Dr. Mesa; will likely stoip Antibiotics on Wednesday if Biopsy negative  Follow up Bone biopsy from proximal margin of amputation site  Podiatry follow up noted; Planned for delayed closure of the wound; d/w Resident scheduled for Tuesday 2 PM  Continue  Oxycodone PRN q 6hrs for better pain control  MIGDALIA/PVR showed non compressible vessels. Vascular surgery on board. CT Angio with mild occlusion without intervention; may benefit form antiplatelet therapy once podiatrically stable; Continue Aspirin for now (resume after OR)  Spoke with Patient and advised that we have counseled him daily on adherence to medications for HF; If he is still unsure, choose his options.   He was strongly encouraged to at least take medications for his Heart failure which can improve his cardiac function.   Diet and Exercise counseled to improve BP and sugar control  DVT ppx  REPEAT LABS TUESDAY PRIOR TO OR. NPO AFTER MIDNIGHT    Discussed with PGY1 Dr. Tobar and PGY2 Dr. Gomez and MS3/4  Discussed with Patient and updated plan as above including OR Tuesday    I will be away 10/26/21 to 11/1/21; Dr. Santoro/ Hospitalist Colleague to assume care Patient seen and examined this morning. Discussed with PGy1 Dr. Tobar    51 year old male with PMH CVA (10 years ago, no deficits), HTN, DM, CHF (EF 30-35% 2/21), non compliant with medication regimen and Anxiety state presented with right foot swelling. Patient stated two weeks ago he noticed bleeding in his shoes and found a staple stuck to the bottom of his feet. He didn't notice it before because he states he doesn't have any sensation in his feet. He went to urgent care, where they prescribed him Keflex and due to progressive worsening leg swelling send from Urgent Care to ED. Patient admitted with Cellulitis and Abscess found to have osteomyelitis to the second and third toe s/p amputation now awaiting delayed wound closure.    Patient doing well in no acute distress. Notified of being very anxious yesterday and was given lorazepam. Yesterday he flet nauseous after Entresto and now he says he gets fatigue with it. Pain well controlled; Eating well; Had BM after Laxatives yesterday.   denies fever, chills, SOB, palpitations, chest pain, nausea, vomiting, diarrhea, constipation, dizziness    Vital Signs Last 24 Hrs  T(C): 36.4 (25 Oct 2021 13:30), Max: 36.5 (25 Oct 2021 06:13)  T(F): 97.6 (25 Oct 2021 13:30), Max: 97.7 (25 Oct 2021 06:13)  HR: 94 (25 Oct 2021 13:30) (88 - 99)  BP: 126/75 (25 Oct 2021 13:30) (107/72 - 126/75)  BP(mean): 87 (25 Oct 2021 13:30) (87 - 87)  RR: 16 (25 Oct 2021 13:30) (16 - 17)  SpO2: 100% (25 Oct 2021 13:30) (100% - 100%)    P/E: As above  middle aged male, comfortable in bed, NAD  Neck: No JVD, no thyromegaly  Mood improved  Neuro: AAO x3; No  gross focal deficits  CVS: S1S2 present, regular  Resp: BLAE+, No wheeze or Rhonchi  GI: Soft, BS+, NT  Extr: No edema or calf tenderness Left leg  Right foot dressing intact with wound vac in place;    Labs: Reviewed                                  10.1   9.09  )-----------( 331      ( 24 Oct 2021 12:37 )             30.6     10-24    135  |  100  |  17  ----------------------------<  208<H>  4.3   |  30  |  1.22    Ca    8.7      24 Oct 2021 12:37    TPro  7.6  /  Alb  2.4<L>  /  TBili  0.4  /  DBili  x   /  AST  14  /  ALT  23  /  AlkPhos  60  10-24    Vancomycin Level, Trough (10.25.21 @ 04:18)  Vancomycin Level, Trough: 13.4: V      A/P:  Cellulitis of the Right foot with abscess and Osteomyelitis second and third digit s/p amputation  PVD   DM with likely Neuropathy sugars controlled  Chronic systolic CHF stable  Severe Pulmonary HTN  CEDRIC improved   HTN stable  Non compliance with medication regimen  ?? Depressed mood    Plan:  MRI with OM, Cont Vancomycin and Cefepime s/p amputation of right 2nd and 3rd toe amputation  Vanco trough therapeutic; ID follow up for duration of antibiotics; d/w Dr. Mesa; will likely stop Antibiotics on Wednesday if Biopsy negative  Follow up Bone biopsy from proximal margin of amputation site; Call Pathology  Podiatry follow up noted; Planned for delayed closure of the wound; d/w Resident scheduled for Tuesday 2 PM  Patient optimized at moderate risk  Continue  Oxycodone PRN q 6hrs for better pain control  MIGDALIA/PVR showed non compressible vessels. Vascular surgery on board. CT Angio with mild occlusion without intervention; may benefit form antiplatelet therapy once podiatrically stable; Continue Aspirin for now (resume after OR)  Spoke with Patient and advised that we have counseled him daily on adherence to medications for HF; If he is still unsure, choose his options.   He was strongly encouraged to at least take medications for his Heart failure which can improve his cardiac function.   Diet and Exercise counseled to improve BP and sugar control  DVT ppx  REPEAT LABS TUESDAY PRIOR TO OR. NPO AFTER MIDNIGHT    Discussed with PGY1 Dr. Tobar and PGY2 Dr. Gomez and MS3/4  Discussed with Patient and updated plan as above including OR Tuesday; D/C Plan Wednesday if remain stable postop.    I will be away 10/26/21 to 11/1/21; Dr. Santoro/ Hospitalist Colleague to assume care

## 2021-10-25 NOTE — PROGRESS NOTE ADULT - ASSESSMENT
A:  s/p 2nd and 3rd right toes amputation 10/20  Osteomyelitis R 2nd & 3rd proximal phalanges  Cellulitis RLE     P:  Pt seen and evaluated  s/p 2nd and 3rd right toes amputation - left opened with application of wound vac   Wound vac removed  Cleansed surgical site with saline  Applied betadine soaked 4x4s  Dressed with 4x4s, ABD, paula, ace  Recommend nonWB to right foot, however can partially weight bear on heel as tolerated   Planning on taking to OR Tuesday 10/26 for delayed primary closure at 3:00pm with Dr. Kitchen  Pt NPO 10/26  Pt covid PCR pending   Continue abx per primary team  Will follow while in house  Discussed with attending Dr. Kitchen

## 2021-10-26 LAB
ANION GAP SERPL CALC-SCNC: 6 MMOL/L — SIGNIFICANT CHANGE UP (ref 5–17)
BASOPHILS # BLD AUTO: 0.04 K/UL — SIGNIFICANT CHANGE UP (ref 0–0.2)
BASOPHILS NFR BLD AUTO: 0.6 % — SIGNIFICANT CHANGE UP (ref 0–2)
BUN SERPL-MCNC: 14 MG/DL — SIGNIFICANT CHANGE UP (ref 7–18)
CALCIUM SERPL-MCNC: 9.2 MG/DL — SIGNIFICANT CHANGE UP (ref 8.4–10.5)
CHLORIDE SERPL-SCNC: 102 MMOL/L — SIGNIFICANT CHANGE UP (ref 96–108)
CO2 SERPL-SCNC: 30 MMOL/L — SIGNIFICANT CHANGE UP (ref 22–31)
CREAT SERPL-MCNC: 1.03 MG/DL — SIGNIFICANT CHANGE UP (ref 0.5–1.3)
EOSINOPHIL # BLD AUTO: 0.06 K/UL — SIGNIFICANT CHANGE UP (ref 0–0.5)
EOSINOPHIL NFR BLD AUTO: 0.9 % — SIGNIFICANT CHANGE UP (ref 0–6)
GLUCOSE BLDC GLUCOMTR-MCNC: 137 MG/DL — HIGH (ref 70–99)
GLUCOSE BLDC GLUCOMTR-MCNC: 141 MG/DL — HIGH (ref 70–99)
GLUCOSE BLDC GLUCOMTR-MCNC: 157 MG/DL — HIGH (ref 70–99)
GLUCOSE BLDC GLUCOMTR-MCNC: 172 MG/DL — HIGH (ref 70–99)
GLUCOSE BLDC GLUCOMTR-MCNC: 271 MG/DL — HIGH (ref 70–99)
GLUCOSE SERPL-MCNC: 158 MG/DL — HIGH (ref 70–99)
HCT VFR BLD CALC: 30.5 % — LOW (ref 39–50)
HGB BLD-MCNC: 10 G/DL — LOW (ref 13–17)
IMM GRANULOCYTES NFR BLD AUTO: 0.6 % — SIGNIFICANT CHANGE UP (ref 0–1.5)
INR BLD: 1.28 RATIO — HIGH (ref 0.88–1.16)
LYMPHOCYTES # BLD AUTO: 1.24 K/UL — SIGNIFICANT CHANGE UP (ref 1–3.3)
LYMPHOCYTES # BLD AUTO: 18 % — SIGNIFICANT CHANGE UP (ref 13–44)
MAGNESIUM SERPL-MCNC: 2.3 MG/DL — SIGNIFICANT CHANGE UP (ref 1.6–2.6)
MCHC RBC-ENTMCNC: 29.5 PG — SIGNIFICANT CHANGE UP (ref 27–34)
MCHC RBC-ENTMCNC: 32.8 GM/DL — SIGNIFICANT CHANGE UP (ref 32–36)
MCV RBC AUTO: 90 FL — SIGNIFICANT CHANGE UP (ref 80–100)
MONOCYTES # BLD AUTO: 0.56 K/UL — SIGNIFICANT CHANGE UP (ref 0–0.9)
MONOCYTES NFR BLD AUTO: 8.1 % — SIGNIFICANT CHANGE UP (ref 2–14)
NEUTROPHILS # BLD AUTO: 4.95 K/UL — SIGNIFICANT CHANGE UP (ref 1.8–7.4)
NEUTROPHILS NFR BLD AUTO: 71.8 % — SIGNIFICANT CHANGE UP (ref 43–77)
NRBC # BLD: 0 /100 WBCS — SIGNIFICANT CHANGE UP (ref 0–0)
PHOSPHATE SERPL-MCNC: 3.5 MG/DL — SIGNIFICANT CHANGE UP (ref 2.5–4.5)
PLATELET # BLD AUTO: 315 K/UL — SIGNIFICANT CHANGE UP (ref 150–400)
POTASSIUM SERPL-MCNC: 3.9 MMOL/L — SIGNIFICANT CHANGE UP (ref 3.5–5.3)
POTASSIUM SERPL-SCNC: 3.9 MMOL/L — SIGNIFICANT CHANGE UP (ref 3.5–5.3)
PROTHROM AB SERPL-ACNC: 15.1 SEC — HIGH (ref 10.6–13.6)
RBC # BLD: 3.39 M/UL — LOW (ref 4.2–5.8)
RBC # FLD: 12.3 % — SIGNIFICANT CHANGE UP (ref 10.3–14.5)
SODIUM SERPL-SCNC: 138 MMOL/L — SIGNIFICANT CHANGE UP (ref 135–145)
WBC # BLD: 6.89 K/UL — SIGNIFICANT CHANGE UP (ref 3.8–10.5)
WBC # FLD AUTO: 6.89 K/UL — SIGNIFICANT CHANGE UP (ref 3.8–10.5)

## 2021-10-26 PROCEDURE — 99233 SBSQ HOSP IP/OBS HIGH 50: CPT | Mod: GC

## 2021-10-26 RX ORDER — CEFEPIME 1 G/1
2000 INJECTION, POWDER, FOR SOLUTION INTRAMUSCULAR; INTRAVENOUS EVERY 12 HOURS
Refills: 0 | Status: DISCONTINUED | OUTPATIENT
Start: 2021-10-26 | End: 2021-10-26

## 2021-10-26 RX ORDER — CHLORHEXIDINE GLUCONATE 213 G/1000ML
1 SOLUTION TOPICAL
Refills: 0 | Status: DISCONTINUED | OUTPATIENT
Start: 2021-10-26 | End: 2021-10-29

## 2021-10-26 RX ORDER — INSULIN LISPRO 100/ML
2 VIAL (ML) SUBCUTANEOUS
Refills: 0 | Status: DISCONTINUED | OUTPATIENT
Start: 2021-10-26 | End: 2021-10-29

## 2021-10-26 RX ORDER — LANOLIN ALCOHOL/MO/W.PET/CERES
3 CREAM (GRAM) TOPICAL AT BEDTIME
Refills: 0 | Status: DISCONTINUED | OUTPATIENT
Start: 2021-10-26 | End: 2021-10-29

## 2021-10-26 RX ORDER — ASPIRIN/CALCIUM CARB/MAGNESIUM 324 MG
81 TABLET ORAL DAILY
Refills: 0 | Status: DISCONTINUED | OUTPATIENT
Start: 2021-10-26 | End: 2021-10-29

## 2021-10-26 RX ORDER — MEROPENEM 1 G/30ML
2000 INJECTION INTRAVENOUS EVERY 8 HOURS
Refills: 0 | Status: DISCONTINUED | OUTPATIENT
Start: 2021-10-26 | End: 2021-10-27

## 2021-10-26 RX ORDER — INSULIN LISPRO 100/ML
VIAL (ML) SUBCUTANEOUS
Refills: 0 | Status: DISCONTINUED | OUTPATIENT
Start: 2021-10-26 | End: 2021-10-29

## 2021-10-26 RX ORDER — VANCOMYCIN HCL 1 G
1500 VIAL (EA) INTRAVENOUS ONCE
Refills: 0 | Status: COMPLETED | OUTPATIENT
Start: 2021-10-26 | End: 2021-10-26

## 2021-10-26 RX ORDER — ALPRAZOLAM 0.25 MG
0.5 TABLET ORAL
Refills: 0 | Status: DISCONTINUED | OUTPATIENT
Start: 2021-10-26 | End: 2021-10-29

## 2021-10-26 RX ORDER — HYDROMORPHONE HYDROCHLORIDE 2 MG/ML
0.5 INJECTION INTRAMUSCULAR; INTRAVENOUS; SUBCUTANEOUS
Refills: 0 | Status: DISCONTINUED | OUTPATIENT
Start: 2021-10-26 | End: 2021-10-27

## 2021-10-26 RX ORDER — ENOXAPARIN SODIUM 100 MG/ML
40 INJECTION SUBCUTANEOUS DAILY
Refills: 0 | Status: DISCONTINUED | OUTPATIENT
Start: 2021-10-26 | End: 2021-10-29

## 2021-10-26 RX ORDER — ATORVASTATIN CALCIUM 80 MG/1
80 TABLET, FILM COATED ORAL AT BEDTIME
Refills: 0 | Status: DISCONTINUED | OUTPATIENT
Start: 2021-10-26 | End: 2021-10-29

## 2021-10-26 RX ORDER — SENNA PLUS 8.6 MG/1
2 TABLET ORAL AT BEDTIME
Refills: 0 | Status: DISCONTINUED | OUTPATIENT
Start: 2021-10-26 | End: 2021-10-29

## 2021-10-26 RX ORDER — SACUBITRIL AND VALSARTAN 24; 26 MG/1; MG/1
1 TABLET, FILM COATED ORAL
Refills: 0 | Status: DISCONTINUED | OUTPATIENT
Start: 2021-10-26 | End: 2021-10-29

## 2021-10-26 RX ORDER — SODIUM CHLORIDE 9 MG/ML
1000 INJECTION, SOLUTION INTRAVENOUS
Refills: 0 | Status: DISCONTINUED | OUTPATIENT
Start: 2021-10-26 | End: 2021-10-29

## 2021-10-26 RX ORDER — ONDANSETRON 8 MG/1
4 TABLET, FILM COATED ORAL ONCE
Refills: 0 | Status: DISCONTINUED | OUTPATIENT
Start: 2021-10-26 | End: 2021-10-27

## 2021-10-26 RX ORDER — VANCOMYCIN HCL 1 G
VIAL (EA) INTRAVENOUS
Refills: 0 | Status: DISCONTINUED | OUTPATIENT
Start: 2021-10-26 | End: 2021-10-27

## 2021-10-26 RX ORDER — VANCOMYCIN HCL 1 G
1500 VIAL (EA) INTRAVENOUS EVERY 12 HOURS
Refills: 0 | Status: DISCONTINUED | OUTPATIENT
Start: 2021-10-27 | End: 2021-10-27

## 2021-10-26 RX ADMIN — MEROPENEM 100 MILLIGRAM(S): 1 INJECTION INTRAVENOUS at 13:03

## 2021-10-26 RX ADMIN — CHLORHEXIDINE GLUCONATE 1 APPLICATION(S): 213 SOLUTION TOPICAL at 05:45

## 2021-10-26 RX ADMIN — HYDROMORPHONE HYDROCHLORIDE 0.5 MILLIGRAM(S): 2 INJECTION INTRAMUSCULAR; INTRAVENOUS; SUBCUTANEOUS at 21:13

## 2021-10-26 RX ADMIN — Medication 0.5 MILLIGRAM(S): at 00:33

## 2021-10-26 RX ADMIN — MEROPENEM 100 MILLIGRAM(S): 1 INJECTION INTRAVENOUS at 05:45

## 2021-10-26 RX ADMIN — Medication 250 MILLIGRAM(S): at 05:45

## 2021-10-26 RX ADMIN — MEROPENEM 200 MILLIGRAM(S): 1 INJECTION INTRAVENOUS at 19:05

## 2021-10-26 RX ADMIN — HYDROMORPHONE HYDROCHLORIDE 0.5 MILLIGRAM(S): 2 INJECTION INTRAMUSCULAR; INTRAVENOUS; SUBCUTANEOUS at 18:33

## 2021-10-26 RX ADMIN — Medication 300 MILLIGRAM(S): at 20:09

## 2021-10-26 NOTE — PROGRESS NOTE ADULT - ATTENDING COMMENTS
Patient was seen and examined at bedside   Complains of pain in RLE     Vitals noted    P/E:  As above     Labs noted    A/P:  Wound closure in OR today by podiatry   Pending pathology result   Cont Vanc and Meropenem  Follow Vanc trough

## 2021-10-26 NOTE — BRIEF OPERATIVE NOTE - NSICDXBRIEFPOSTOP_GEN_ALL_CORE_FT
POST-OP DIAGNOSIS:  PAD (peripheral artery disease) 19-Oct-2021 19:33:28  Tegan Avila  
POST-OP DIAGNOSIS:  Toe osteomyelitis, right 20-Oct-2021 14:18:17 Right 2nd & 3rd proximal phalanx Eleni An  
POST-OP DIAGNOSIS:  Toe osteomyelitis, right 20-Oct-2021 14:18:17 Right 2nd & 3rd proximal phalanx Eleni An

## 2021-10-26 NOTE — PROGRESS NOTE ADULT - SUBJECTIVE AND OBJECTIVE BOX
PGY-1 Progress Note discussed with attending    PAGER #: [[phone]] TILL 5:00 PM  PLEASE CONTACT ON CALL TEAM:  - On Call Team (Please refer to Quintin) FROM 5:00 PM - 8:30PM  - Nightfloat Team FROM 8:30 -7:30 AM    CHIEF COMPLAINT & BRIEF HOSPITAL COURSE:  51 year old male with PMH CVA (10 years ago, no deficits), HTN, DM, CHF (EF 30-35% 2/21), presented with right foot swelling. Patient stated two weeks ago he noticed bleeding in his shoes and found a staple stuck to the bottom of his feet. He didn't notice it before because he states he doesn't have any sensation in his feet. He went to urgent care, where they prescribed him Keflex Q8hrs. Patient states the foot started to swell more so he returned to urgent care yesterday where they gave him a prescription of clindamycin. Patient this morning started to endorse chills, nausea, but denies any fever, vomiting, chest pain, SOB, abdominal pain, or changes in bowel habits.  Patient was admitted in February due to covid, and was found to have CHF and DM. Patient has not been complaint with any of his medications and states he just takes Lasix and Xanax. MRI 10/18 shows proximal OM of 2nd and 3rd digit of R. Foot. Angiogram 10/19 shows Inline flow to R foot via dominant PT and smaller peroneal with AT occlusion. Pt. had amputation of the 2nd and 3rd digit on the R. foot on 10/20. Pt. Tissue Cx shows E.coli ESBL. Given Vanc. and Meropenem. Planned for primary closure of wound on 10/26.     INTERVAL HPI/OVERNIGHT EVENTS:   Pt. s/p 2nd and 3rd digit amputation of R. Foot POD#6. Planned for primary closure of wound today with podiatry. Pt continues to be non-compliant with CHF medicines due to side effects. Does not want to try other options. Describes R. foot pain of 5/10. Does not want to increase pain medications. Pt. had trouble sleeping last night and asked for dose of alprazolam after which he was able to sleep. Pt. is anxious today due to upcoming procedure.    MEDICATIONS  (STANDING):  aspirin enteric coated 81 milliGRAM(s) Oral daily  atorvastatin 80 milliGRAM(s) Oral at bedtime  chlorhexidine 2% Cloths 1 Application(s) Topical <User Schedule>  enoxaparin Injectable 40 milliGRAM(s) SubCutaneous daily  influenza   Vaccine 0.5 milliLiter(s) IntraMuscular once  insulin lispro (ADMELOG) corrective regimen sliding scale   SubCutaneous Before meals and at bedtime  insulin lispro Injectable (ADMELOG) 2 Unit(s) SubCutaneous three times a day before meals  melatonin 3 milliGRAM(s) Oral at bedtime  meropenem  IVPB 1000 milliGRAM(s) IV Intermittent every 8 hours  polyethylene glycol 3350 17 Gram(s) Oral daily  sacubitril 24 mG/valsartan 26 mG 1 Tablet(s) Oral two times a day  senna 2 Tablet(s) Oral at bedtime  vancomycin  IVPB 1000 milliGRAM(s) IV Intermittent every 12 hours    MEDICATIONS  (PRN):  ALPRAZolam 0.5 milliGRAM(s) Oral two times a day PRN Anxiety  ondansetron Injectable 4 milliGRAM(s) IV Push every 6 hours PRN Nausea and/or Vomiting    REVIEW OF SYSTEMS:  CONSTITUTIONAL: No fever, chills, weight loss, + fatigue  RESPIRATORY: No dry cough, Denies wheezing, chills or hemoptysis; No shortness of breath  CARDIOVASCULAR: No chest pain, palpitations, dizziness, or leg swelling  GASTROINTESTINAL: No abdominal pain. No vomiting, or hematemesis; No diarrhea or constipation. No melena or hematochezia.  GENITOURINARY: No dysuria or hematuria, urinary frequency  NEUROLOGICAL: No headaches, memory loss, loss of strength  MSK: Right foot pain, dull 5/10  SKIN: Slightly bruising and discoloration on both anterior legs    Vital Signs Last 24 Hrs  T(C): 36.5 (25 Oct 2021 06:13), Max: 36.5 (25 Oct 2021 06:13)  T(F): 97.7 (25 Oct 2021 06:13), Max: 97.7 (25 Oct 2021 06:13)  HR: 88 (25 Oct 2021 06:13) (88 - 99)  BP: 107/72 (25 Oct 2021 06:13) (107/72 - 111/71)  BP(mean): 79 (24 Oct 2021 13:50) (79 - 79)  RR: 17 (25 Oct 2021 06:13) (16 - 17)  SpO2: 100% (25 Oct 2021 06:13) (100% - 100%)    PHYSICAL EXAMINATION:  GENERAL: NAD, well built  HEAD:  Atraumatic, Normocephalic  EYES:  conjunctiva and sclera clear  NECK: Supple, No JVD, Normal thyroid  CHEST/LUNG: Lungs CTA. No rales, rhonchi, wheezing, or rubs  HEART: Regular rate and rhythm; No murmurs, rubs, or gallops  ABDOMEN: Soft, Nontender, Nondistended; Bowel sounds present  NERVOUS SYSTEM:  Alert & Oriented X3  EXTREMITIES:  1+ pitting edema b/l 2+ Peripheral Pulses, No clubbing, cyanosis  SKIN: s/p R. Foot 2nd and 3rd digit amputation, wound dressed.                                            10.0   6.89  )-----------( 315      ( 26 Oct 2021 08:54 )             30.5       138  |  102  |  14  ----------------------------<  158<H>  3.9   |  30  |  x     Ca    9.2      26 Oct 2021 08:54  Mg     2.3     10-26    TPro  7.6  /  Alb  2.4<L>  /  TBili  0.4  /  DBili  x   /  AST  14  /  ALT  23  /  AlkPhos  60  10-24      Vancomycin Level, Trough: 13.4      CAPILLARY BLOOD GLUCOSE      POCT Blood Glucose.: 172 mg/dL (26 Oct 2021 07:47)  POCT Blood Glucose.: 157 mg/dL (26 Oct 2021 00:32)  POCT Blood Glucose.: 179 mg/dL (25 Oct 2021 21:12)  POCT Blood Glucose.: 156 mg/dL (25 Oct 2021 16:40)  POCT Blood Glucose.: 189 mg/dL (25 Oct 2021 11:30)      RADIOLOGY & ADDITIONAL TESTS:    EXAM:  US PHYSIOL LWR EXT 3+ LEV BI                        PROCEDURE DATE:  10/16/2021    INTERPRETATION:  Clinical indication: Ischemic toe  COMPARISON: None  FINDINGS: There are pulsatile waveforms bilaterally. Segmental pressures could not be obtained due to calcified, noncompressible vessels. Therefore, ABIs could not be calculated.  IMPRESSION: ABIs could not be calculated due to calcified, noncompressible vessels.    ------------------------------------------------------------------------    EXAM:  XR FOOT 2 VIEWS RT                        PROCEDURE DATE:  10/15/2021    INTERPRETATION:  Cellulitis swollen right foot.  3 views right foot.  No fracture or dislocation. No focal bone lysis or unusual periosteal reaction. Joint spaces preserved. Small calcaneal osteophytes. Vascular calcification. Soft tissue swelling mostly over the dorsum of the foot may reflect cellulitis. No soft tissue gas.  IMPRESSION: No acute or destructive osseous pathology.  If there is clinical suspicion of osteomyelitis consider bone scan or MRI    ------------------------------------------------------------------------    EXAM:  MR FOOT RT                        PROCEDURE DATE:  10/18/2021    INTERPRETATION:  EXAMINATION: MR FOOT RIGHT  CLINICAL INDICATION: Evaluate for osteomyelitis  COMPARISON: Radiographs dated 10/15/2021  TECHNIQUE: Multiplanar, multi-sequence MRI of the right foot was performed without intravenous contrast.  INTERPRETATION:  Bones and soft tissues: There is no fracture. There is patchy STIR hyperintensity within the second digit proximal phalanx with mild patchy T1 hypointensity seen on the short axis TI images. Similar finding is seen at the base of the third digit proximal phalanx. The findings are favored to represent early osteomyelitis, with reactive osteitis considered less likely.  There is extensive surrounding soft tissue edema and phlegmon at the second and third digits which may represent abscess-in -formation with blistering of the overlying skin. Multiple foci of hypointensity seen on series 6 and 7 at the second and third digits may be related to vascular atherosclerosis versus foci of soft tissue gas. Advise radiographic correlation (prior radiographs are dated 10/15/2021).  Muscles: There is extensive edema within the intrinsic muscles of the foot which may be seen in neuropathy.  IMPRESSION:  1.  Findings favored to represent osteomyelitis involving second and third digit proximal phalanges.  2.  Extensive overlying soft tissue infection involving the second and third digits with surrounding edema and phlegmon, may represent abscess-in-formation. Additionally, multiple rounded foci of hypointensity in soft tissue at the second and third digits may be related to vascular atherosclerosis versus foci of soft tissue gas. Advise radiographic correlation (prior radiographs are dated 10/15/2021).    ------------------------------------------------------------------------    PROCEDURE: Transthoracic echocardiogram with 2-D, M-Mode  and complete spectral and color flow Doppler.  Study Date: 10/19/2021  INDICATION: Cardiomyopathy, unspecified (I42.9)  HISTORY:    DIMENSIONS:  Dimensions:     Normal Values:  LA:     4.9 cm    2.0 - 4.0 cm  Ao:     3.9 cm    2.0 - 3.8 cm  SEPTUM: 0.9 cm    0.6 - 1.2 cm  PWT:    1.0 cm    0.6 - 1.1 cm  LVIDd:  5.9 cm    3.0 - 5.6 cm  LVIDs:  5.9 cm    1.8 - 4.0 cm    Derived Variables:  LVMI: 101 g/m2  RWT: 0.33  Ejection Fraction Visual Estimate: 30-35 %  Ejection Fraction Stevens: 33 %    OBSERVATIONS:  Mitral Valve: Normal mitral valve. Mild mitral  regurgitation.  Aortic Root: Aortic Root: 3.9 cm.    Aortic Valve: Calcified trileaflet aortic valve with normal  opening. Mild aortic regurgitation.  Left Atrium: Normal left atrium.  LA volume index = 31  cc/m2.  Left Ventricle: Dilated lv with severe segmental left  ventricular systolic dysfunction.The distal  septum,apex,inferiorand infero-lateral walls are  hypokinetic EF=30-35%. Normal left ventricular internal  dimensions and wall thicknesses. Grade I diastolic  dysfunction (Impaired relaxation, mild).  Right Heart: Normal right atrium. Normal right ventricular  size and systolic function (TAPSE  1.7cm). There is mild  tricuspid regurgitation. Normal pulmonic valve.  Pericardium/PleuraNormal pericardium with no pericardial  effusion.  Hemodynamic: RV systolic pressure is severely increased at  65 mm Hg.    CONCLUSIONS:  1. Normal mitral valve. Mild mitral regurgitation.  2. Calcified trileaflet aortic valve with normal opening.  Mild aortic regurgitation.  3. Aortic Root: 3.9 cm.    4. Normal left atrium.  LA volume index = 31 cc/m2.  5. Normal left ventricular internal dimensions and wall  thicknesses.  6. Dilated lv with severe segmental left ventricular  systolic dysfunction.The distal septum,apex,inferiorand  infero-lateral walls are hypokinetic EF=30-35%.  7. Grade I diastolic dysfunction (Impaired relaxation,  mild).  8. Normal right atrium.  9. Normal right ventricular size and systolic function  (TAPSE  1.7cm).  10. RV systolic pressure is severely increased at  65 mm  Hg.  11. There is mild tricuspid regurgitation.  12. Normal pulmonic valve.  13. Normal pericardium with no pericardial effusion.    ------------------------------------------------------------------------    EXAM:  XR FOOT COMP MIN 3 VIEWS RT                        PROCEDURE DATE:  10/20/2021    INTERPRETATION:  Right foot  HISTORY: Postop  COMPARISON: 10/15/2021   Two views of the right foot show amputation of the second and third toes. Wound VAC device is present. The joint spaces are maintained.  IMPRESSION: Postoperative changes.

## 2021-10-26 NOTE — BRIEF OPERATIVE NOTE - NSICDXBRIEFPROCEDURE_GEN_ALL_CORE_FT
PROCEDURES:  Complete amputation of second toe of right foot by open approach 20-Oct-2021 14:16:49  Eleni An  Complete amputation of third toe of right foot by open approach 20-Oct-2021 14:17:17  Eleni An  Delayed primary closure of foot 26-Oct-2021 17:25:51  Eleni An  Debridement of skin, subcutaneous tissue, muscle, and bone of right foot 26-Oct-2021 17:26:13  Eleni An  
PROCEDURES:  Complete amputation of second toe of right foot by open approach 20-Oct-2021 14:16:49  Eleni An  Complete amputation of third toe of right foot by open approach 20-Oct-2021 14:17:17  Eleni An  
PROCEDURES:  Angiography of unilateral lower extremity 19-Oct-2021 19:33:00 Rt Tegan [place]

## 2021-10-26 NOTE — PROGRESS NOTE ADULT - SUBJECTIVE AND OBJECTIVE BOX
51y Male is under our care for     REVIEW OF SYSTEMS:  [  ] Not able to elicit  General:	  Chest:	  GI:	  :  Skin:	  Musculoskeletal:	  Neuro:	    MEDS:  meropenem  IVPB 1000 milliGRAM(s) IV Intermittent every 8 hours  vancomycin  IVPB 1000 milliGRAM(s) IV Intermittent every 12 hours    ALLERGIES: Allergies    Nuts (Unknown)  tetracycline (Hives)    Intolerances        VITALS:  Vital Signs Last 24 Hrs  T(C): 36.8 (26 Oct 2021 05:10), Max: 36.8 (26 Oct 2021 05:10)  T(F): 98.2 (26 Oct 2021 05:10), Max: 98.2 (26 Oct 2021 05:10)  HR: 79 (26 Oct 2021 05:10) (79 - 100)  BP: 112/74 (26 Oct 2021 05:10) (112/74 - 126/75)  BP(mean): 87 (25 Oct 2021 13:30) (87 - 87)  RR: 18 (26 Oct 2021 05:10) (16 - 18)  SpO2: 98% (26 Oct 2021 05:10) (98% - 100%)      PHYSICAL EXAM:  HEENT:  Neck:  Respiratory:  Cardiovascular:  Gastrointestinal:  Extremities:  Skin:  Ortho:  Neuro:    LABS/DIAGNOSTIC TESTS:                        10.0   6.89  )-----------( 315      ( 26 Oct 2021 08:54 )             30.5     WBC Count: 6.89 K/uL (10-26 @ 08:54)  WBC Count: 9.09 K/uL (10-24 @ 12:37)  WBC Count: 10.73 K/uL (10-22 @ 07:55)    10-26    138  |  102  |  14  ----------------------------<  158<H>  3.9   |  30  |  1.03    Ca    9.2      26 Oct 2021 08:54  Phos  3.5     10-26  Mg     2.3     10-26    TPro  7.6  /  Alb  2.4<L>  /  TBili  0.4  /  DBili  x   /  AST  14  /  ALT  23  /  AlkPhos  60  10-24      CULTURES:   .Tissue right 3rd metatarsal  10-20 @ 21:21   Rare Escherichia coli ESBL  Rare Cutibacterium acnes "Susceptibilities not performed"  --  Escherichia coli ESBL      .Tissue right 2nd metatarsal  10-20 @ 21:20   Few Escherichia coli ESBL  --  Escherichia coli ESBL      .Surgical Swab R foot plantar wound  10-16 @ 02:28   Numerous Finegoldia magna "Susceptibilities not performed"  Few Staphylococcus aureus  Few Streptococcus agalactiae (Group B) isolated  Group B streptococci are susceptible to ampicillin,  penicillin and cefazolin, but may be resistant to  erythromycin and clindamycin.  Recommendations for intrapartum prophylaxis for Group B  streptococci are penicillin or ampicillin.  --  Staphylococcus aureus        RADIOLOGY:  no new studies 51y Male is under our care for right foot osteomyelitis.  Patient was seen sitting comfortably in the chair with no acute distress.  He reports mild improvement in right foot pain and will be going to the OR today for delayed primary closure.  Patient remains afebrile and WBC count is WNL.    REVIEW OF SYSTEMS:  [  ] Not able to elicit  General: no fevers no malaise  Chest: no cough no sob  GI: no nvd  : no urinary sxs   Skin: no rashes  Musculoskeletal: mild right foot pain  Neuro: no ha's no dizziness     MEDS:  meropenem  IVPB 1000 milliGRAM(s) IV Intermittent every 8 hours  vancomycin  IVPB 1000 milliGRAM(s) IV Intermittent every 12 hours    ALLERGIES: Allergies    Nuts (Unknown)  tetracycline (Hives)    Intolerances        VITALS:  Vital Signs Last 24 Hrs  T(C): 36.8 (26 Oct 2021 05:10), Max: 36.8 (26 Oct 2021 05:10)  T(F): 98.2 (26 Oct 2021 05:10), Max: 98.2 (26 Oct 2021 05:10)  HR: 79 (26 Oct 2021 05:10) (79 - 100)  BP: 112/74 (26 Oct 2021 05:10) (112/74 - 126/75)  BP(mean): 87 (25 Oct 2021 13:30) (87 - 87)  RR: 18 (26 Oct 2021 05:10) (16 - 18)  SpO2: 98% (26 Oct 2021 05:10) (98% - 100%)      PHYSICAL EXAM:  HEENT: n/a  Neck: supple no LN's   Respiratory: lungs clear no rales  Cardiovascular: S1 S2 reg no murmurs  Gastrointestinal: +BS with soft, nondistended abdomen; nontender  Extremities: no edema, right foot dressing dry and intact  Skin: no rashes  Ortho: n/a  Neuro: AAO x 4    LABS/DIAGNOSTIC TESTS:                        10.0   6.89  )-----------( 315      ( 26 Oct 2021 08:54 )             30.5     WBC Count: 6.89 K/uL (10-26 @ 08:54)  WBC Count: 9.09 K/uL (10-24 @ 12:37)  WBC Count: 10.73 K/uL (10-22 @ 07:55)    10-26    138  |  102  |  14  ----------------------------<  158<H>  3.9   |  30  |  1.03    Ca    9.2      26 Oct 2021 08:54  Phos  3.5     10-26  Mg     2.3     10-26    TPro  7.6  /  Alb  2.4<L>  /  TBili  0.4  /  DBili  x   /  AST  14  /  ALT  23  /  AlkPhos  60  10-24      CULTURES:   .Tissue right 3rd metatarsal  10-20 @ 21:21   Rare Escherichia coli ESBL  Rare Cutibacterium acnes "Susceptibilities not performed"  --  Escherichia coli ESBL      .Tissue right 2nd metatarsal  10-20 @ 21:20   Few Escherichia coli ESBL  --  Escherichia coli ESBL      .Surgical Swab R foot plantar wound  10-16 @ 02:28   Numerous Finegoldia magna "Susceptibilities not performed"  Few Staphylococcus aureus  Few Streptococcus agalactiae (Group B) isolated  Group B streptococci are susceptible to ampicillin,  penicillin and cefazolin, but may be resistant to  erythromycin and clindamycin.  Recommendations for intrapartum prophylaxis for Group B  streptococci are penicillin or ampicillin.  --  Staphylococcus aureus        RADIOLOGY:  no new studies

## 2021-10-26 NOTE — PROGRESS NOTE ADULT - ASSESSMENT
A:  s/p 2nd and 3rd right toes amputation 10/20  Osteomyelitis R 2nd & 3rd proximal phalanges  Cellulitis RLE     P:  Pt seen and evaluated  s/p 2nd and 3rd right toes amputation - left opened with application of wound vac   Cleansed surgical site with saline  Applied betadine soaked 4x4s  Dressed with 4x4s, ABD, paula, ace  Recommend nonWB to right foot, however can partially weight bear on heel as tolerated   Pt consented for   Pt NPO 10/26  Pt covid negative 10/25  Continue abx per primary team  Will follow while in house  Discussed and seen bedside with attending Dr. Kitchen    A:  s/p 2nd and 3rd right toes amputation 10/20  Osteomyelitis R 2nd & 3rd proximal phalanges  Cellulitis RLE     P:  Pt seen and evaluated  s/p 2nd and 3rd right toes amputation - left opened with application of wound vac   Cleansed surgical site with saline  Applied betadine soaked 4x4s  Dressed with 4x4s, ABD, paula, ace  Recommend nonWB to right foot, however can partially weight bear on heel as tolerated   Pt consented for R foot delayed primary closure at 3pm with Dr. Kitchen  Pt NPO 10/26  Pt covid negative 10/25  Continue abx per primary team  Will follow while in house  Discussed and seen bedside with attending Dr. Kitchen

## 2021-10-26 NOTE — PROGRESS NOTE ADULT - SUBJECTIVE AND OBJECTIVE BOX
Podiatry Interval HPI: Pt seen resting in bed s/p 2nd and 3rd toe amputation R foot 10/20. Pt states he was able to sleep a little last night. Pt denies any acute overnight events. Pt denies any constitutional symptoms of N/V/C/F/Sob. Pt has been NPO since midnight 10/26. Pt has been covid negative 10/25.     Podiatry HPI: Podiatry consulted regarding a 52 yo male who presents to the ED for a right foot infection. Pt states that he stepped on a brass staple about 2 weeks ago and had no idea it punctured through the skin until the next morning when he started to feel pain. Pt states that he is diabetic so his sensation in his feet is diminished. Pt reports that he went to the urgent care and was prescribed keflex which he finished. He was also told by the urgent care to soak the foot in epsom salt and apply bacitracin cream on it. Pt reports that soaking it made it worse and turned into a infection a few days ago. Pt is complaining 6/10 pain on the right foot. Admits to having chills. Pt denies constitutional symptoms of N/V/C/F/Sob.     HPI: Patient is a 50 y/o male who presents with worsening right foot pain and wound x2 weeks for which he was prescribed keflex course which he completed and a clindamycin course which he just started. Patient has PMH significant for DM, CHF. Patient reports that a "contractor grade staple" punctured his foot and he didn't realize until the next morning when he couldn't bear weight. He reports he is up to date on his tetanus shot. He reports chills but denies fever as he "did not check it" and ascending leg pain at times. Denies shortness of breath, chest pain or pressure, nausea or vomiting.            Medications ALPRAZolam 0.5 milliGRAM(s) Oral two times a day PRN  aspirin enteric coated 81 milliGRAM(s) Oral daily  atorvastatin 80 milliGRAM(s) Oral at bedtime  chlorhexidine 2% Cloths 1 Application(s) Topical <User Schedule>  enoxaparin Injectable 40 milliGRAM(s) SubCutaneous daily  influenza   Vaccine 0.5 milliLiter(s) IntraMuscular once  insulin lispro (ADMELOG) corrective regimen sliding scale   SubCutaneous Before meals and at bedtime  insulin lispro Injectable (ADMELOG) 2 Unit(s) SubCutaneous three times a day before meals  melatonin 3 milliGRAM(s) Oral at bedtime  meropenem  IVPB 1000 milliGRAM(s) IV Intermittent every 8 hours  ondansetron Injectable 4 milliGRAM(s) IV Push every 6 hours PRN  sacubitril 24 mG/valsartan 26 mG 1 Tablet(s) Oral two times a day  senna 2 Tablet(s) Oral at bedtime  vancomycin  IVPB 1000 milliGRAM(s) IV Intermittent every 12 hours    FHNo pertinent family history    ,   PMHCVA (cerebral vascular accident)    HTN (hypertension)    Anxiety    DM (diabetes mellitus)    Chronic CHF    Chronic systolic congestive heart failure       PSHNo pertinent past surgical history        Labs                          10.0   6.89  )-----------( 315      ( 26 Oct 2021 08:54 )             30.5      10-26    138  |  102  |  14  ----------------------------<  158<H>  3.9   |  30  |  1.03    Ca    9.2      26 Oct 2021 08:54  Phos  3.5     10-26  Mg     2.3     10-26    TPro  7.6  /  Alb  2.4<L>  /  TBili  0.4  /  DBili  x   /  AST  14  /  ALT  23  /  AlkPhos  60  10-24     Vital Signs Last 24 Hrs  T(C): 36.8 (26 Oct 2021 05:10), Max: 36.8 (26 Oct 2021 05:10)  T(F): 98.2 (26 Oct 2021 05:10), Max: 98.2 (26 Oct 2021 05:10)  HR: 79 (26 Oct 2021 05:10) (79 - 100)  BP: 112/74 (26 Oct 2021 05:10) (112/74 - 126/75)  BP(mean): 87 (25 Oct 2021 13:30) (87 - 87)  RR: 18 (26 Oct 2021 05:10) (16 - 18)  SpO2: 98% (26 Oct 2021 05:10) (98% - 100%)  Sedimentation Rate, Erythrocyte: 98 mm/Hr (10-21-21 @ 04:40)  Sedimentation Rate, Erythrocyte: 99 mm/Hr (10-15-21 @ 13:37)         C-Reactive Protein, Serum: 42 mg/L (10-21-21 @ 14:41)  C-Reactive Protein, Serum: 106 mg/L (10-16-21 @ 01:09)   WBC Count: 6.89 K/uL (10-26-21 @ 08:54)         PHYSICAL EXAM  GEN: CLARIBEL REICH is a pleasant well-nourished, well developed 51y Male in no acute distress, alert awake, and oriented to person, place and time.   LE Focused:    Vasc: DP/PT pulses faintly palpable, CFT < 5 seconds to digits, TG warm to cool, pitting edema present on bilateral legs  Derm: Surgical site R 2nd and 3rd digit amputation left open with R 2nd & 3rd metatarsal heads visible. No purlent drainage noted. No streaking or erythema noted. No clinical signs of infection noted to R surgical site.  Neuro: Protective sensation grossly diminished  MSK: Able to wiggles toes. R 2nd & 3rd digit amputation     10/15: s/p R foot bedside I&D: tracking noted in all directions, probes to bone, serosanguinous drainage noted       Imaging:    EXAM:  MR FOOT RT                          PROCEDURE DATE:  10/18/2021      INTERPRETATION:  EXAMINATION: MR FOOT RIGHT    CLINICAL INDICATION:Evaluate for osteomyelitis    COMPARISON: Radiographs dated 10/15/2021    TECHNIQUE: Multiplanar, multi-sequence MRI of the right foot was performed without intravenous contrast.    INTERPRETATION:    Bones and soft tissues: There is no fracture. There is patchy STIR hyperintensity within the second digit proximal phalanx with mild patchy T1 hypointensity seen on the short axis TI images. Similar finding is seen at the base of the third digit proximal phalanx. The findings are favored to represent early osteomyelitis, with reactive osteitis considered less likely.    There is extensive surrounding soft tissue edema and phlegmon at the second and third digits which may represent abscess-in -formation with blistering of the overlying skin. Multiple foci of hypointensity seen on series 6 and 7 at the second and third digits may be related to vascular atherosclerosis versus foci of soft tissue gas. Advise radiographic correlation (prior radiographs are dated 10/15/2021).    Muscles: There is extensive edema within the intrinsic muscles of the foot which may be seen in neuropathy.    IMPRESSION:  1.  Findings favored to represent osteomyelitis involving second and third digit proximal phalanges.    2.  Extensive overlying soft tissue infection involving the second and third digits with surrounding edema and phlegmon, may represent abscess-in-formation. Additionally, multiple rounded foci of hypointensity in soft tissue at the second and third digits may be related to vascular atherosclerosis versus foci of soft tissue gas. Advise radiographic correlation (prior radiographs are dated 10/15/2021).      EXAM:  XR FOOT 2 VIEWS RT                          PROCEDURE DATE:  10/15/2021        INTERPRETATION:  Cellulitis swollen right foot.    3 views right foot.    No fracture or dislocation. No focal bone lysis or unusual periosteal reaction. Joint spaces preserved. Small calcaneal osteophytes. Vascular calcification. Soft tissue swelling mostly over the dorsum of the foot may reflect cellulitis. No soft tissue gas.    IMPRESSION: No acute or destructive osseous pathology.    If there is clinical suspicion of osteomyelitis consider bone scan or MRI        EXAM:  US PHYSIOL LWR EXT 3+ LEV BI                          PROCEDURE DATE:  10/16/2021      INTERPRETATION:  Clinical indication: Ischemic toe    COMPARISON: None    FINDINGS: There are pulsatile waveforms bilaterally. Segmental pressures could not be obtained due to calcified, noncompressible vessels. Therefore, ABIs could not be calculated.    IMPRESSION: ABIs could not be calculated due to calcified, noncompressible vessels.      Culture  Specimen Source: .Surgical Swab R foot plantar wound (10.16.21 @ 02:28) Culture Results:   Few Streptococcus agalactiae (Group B) isolated   Group B streptococci are susceptible to ampicillin,   penicillin and cefazolin, but may be resistant to   erythromycin and clindamycin.   Recommendations for intrapartum prophylaxis for Group B   streptococci are penicillin or ampicillin. (10.16.21 @ 02:28)                    Podiatry Interval HPI: Pt seen resting in bed s/p 2nd and 3rd toe amputation R foot 10/20. Pt states he was able to sleep a little last night. Pt denies any acute overnight events. Pt denies any constitutional symptoms of N/V/C/F/Sob. Pt has been NPO since midnight 10/26. Pt has been covid negative 10/25. Pt consented for R delayed primary closure at 3pm with Dr. Kitchen.    Podiatry HPI: Podiatry consulted regarding a 50 yo male who presents to the ED for a right foot infection. Pt states that he stepped on a brass staple about 2 weeks ago and had no idea it punctured through the skin until the next morning when he started to feel pain. Pt states that he is diabetic so his sensation in his feet is diminished. Pt reports that he went to the urgent care and was prescribed keflex which he finished. He was also told by the urgent care to soak the foot in epsom salt and apply bacitracin cream on it. Pt reports that soaking it made it worse and turned into a infection a few days ago. Pt is complaining 6/10 pain on the right foot. Admits to having chills. Pt denies constitutional symptoms of N/V/C/F/Sob.     HPI: Patient is a 50 y/o male who presents with worsening right foot pain and wound x2 weeks for which he was prescribed keflex course which he completed and a clindamycin course which he just started. Patient has PMH significant for DM, CHF. Patient reports that a "contractor grade staple" punctured his foot and he didn't realize until the next morning when he couldn't bear weight. He reports he is up to date on his tetanus shot. He reports chills but denies fever as he "did not check it" and ascending leg pain at times. Denies shortness of breath, chest pain or pressure, nausea or vomiting.            Medications ALPRAZolam 0.5 milliGRAM(s) Oral two times a day PRN  aspirin enteric coated 81 milliGRAM(s) Oral daily  atorvastatin 80 milliGRAM(s) Oral at bedtime  chlorhexidine 2% Cloths 1 Application(s) Topical <User Schedule>  enoxaparin Injectable 40 milliGRAM(s) SubCutaneous daily  influenza   Vaccine 0.5 milliLiter(s) IntraMuscular once  insulin lispro (ADMELOG) corrective regimen sliding scale   SubCutaneous Before meals and at bedtime  insulin lispro Injectable (ADMELOG) 2 Unit(s) SubCutaneous three times a day before meals  melatonin 3 milliGRAM(s) Oral at bedtime  meropenem  IVPB 1000 milliGRAM(s) IV Intermittent every 8 hours  ondansetron Injectable 4 milliGRAM(s) IV Push every 6 hours PRN  sacubitril 24 mG/valsartan 26 mG 1 Tablet(s) Oral two times a day  senna 2 Tablet(s) Oral at bedtime  vancomycin  IVPB 1000 milliGRAM(s) IV Intermittent every 12 hours    FHNo pertinent family history    ,   PMHCVA (cerebral vascular accident)    HTN (hypertension)    Anxiety    DM (diabetes mellitus)    Chronic CHF    Chronic systolic congestive heart failure       PSHNo pertinent past surgical history        Labs                          10.0   6.89  )-----------( 315      ( 26 Oct 2021 08:54 )             30.5      10-26    138  |  102  |  14  ----------------------------<  158<H>  3.9   |  30  |  1.03    Ca    9.2      26 Oct 2021 08:54  Phos  3.5     10-26  Mg     2.3     10-26    TPro  7.6  /  Alb  2.4<L>  /  TBili  0.4  /  DBili  x   /  AST  14  /  ALT  23  /  AlkPhos  60  10-24     Vital Signs Last 24 Hrs  T(C): 36.8 (26 Oct 2021 05:10), Max: 36.8 (26 Oct 2021 05:10)  T(F): 98.2 (26 Oct 2021 05:10), Max: 98.2 (26 Oct 2021 05:10)  HR: 79 (26 Oct 2021 05:10) (79 - 100)  BP: 112/74 (26 Oct 2021 05:10) (112/74 - 126/75)  BP(mean): 87 (25 Oct 2021 13:30) (87 - 87)  RR: 18 (26 Oct 2021 05:10) (16 - 18)  SpO2: 98% (26 Oct 2021 05:10) (98% - 100%)  Sedimentation Rate, Erythrocyte: 98 mm/Hr (10-21-21 @ 04:40)  Sedimentation Rate, Erythrocyte: 99 mm/Hr (10-15-21 @ 13:37)         C-Reactive Protein, Serum: 42 mg/L (10-21-21 @ 14:41)  C-Reactive Protein, Serum: 106 mg/L (10-16-21 @ 01:09)   WBC Count: 6.89 K/uL (10-26-21 @ 08:54)         PHYSICAL EXAM  GEN: CLARIBEL REICH is a pleasant well-nourished, well developed 51y Male in no acute distress, alert awake, and oriented to person, place and time.   LE Focused:    Vasc: DP/PT pulses faintly palpable, CFT < 5 seconds to digits, TG warm to cool, pitting edema present on bilateral legs  Derm: Surgical site R 2nd and 3rd digit amputation left open with R 2nd & 3rd metatarsal heads visible. No purlent drainage noted. No streaking or erythema noted. No clinical signs of infection noted to R surgical site.  Neuro: Protective sensation grossly diminished  MSK: Able to wiggles toes. R 2nd & 3rd digit amputation     10/15: s/p R foot bedside I&D: tracking noted in all directions, probes to bone, serosanguinous drainage noted       Imaging:    EXAM:  MR FOOT RT                          PROCEDURE DATE:  10/18/2021      INTERPRETATION:  EXAMINATION: MR FOOT RIGHT    CLINICAL INDICATION:Evaluate for osteomyelitis    COMPARISON: Radiographs dated 10/15/2021    TECHNIQUE: Multiplanar, multi-sequence MRI of the right foot was performed without intravenous contrast.    INTERPRETATION:    Bones and soft tissues: There is no fracture. There is patchy STIR hyperintensity within the second digit proximal phalanx with mild patchy T1 hypointensity seen on the short axis TI images. Similar finding is seen at the base of the third digit proximal phalanx. The findings are favored to represent early osteomyelitis, with reactive osteitis considered less likely.    There is extensive surrounding soft tissue edema and phlegmon at the second and third digits which may represent abscess-in -formation with blistering of the overlying skin. Multiple foci of hypointensity seen on series 6 and 7 at the second and third digits may be related to vascular atherosclerosis versus foci of soft tissue gas. Advise radiographic correlation (prior radiographs are dated 10/15/2021).    Muscles: There is extensive edema within the intrinsic muscles of the foot which may be seen in neuropathy.    IMPRESSION:  1.  Findings favored to represent osteomyelitis involving second and third digit proximal phalanges.    2.  Extensive overlying soft tissue infection involving the second and third digits with surrounding edema and phlegmon, may represent abscess-in-formation. Additionally, multiple rounded foci of hypointensity in soft tissue at the second and third digits may be related to vascular atherosclerosis versus foci of soft tissue gas. Advise radiographic correlation (prior radiographs are dated 10/15/2021).      EXAM:  XR FOOT 2 VIEWS RT                          PROCEDURE DATE:  10/15/2021        INTERPRETATION:  Cellulitis swollen right foot.    3 views right foot.    No fracture or dislocation. No focal bone lysis or unusual periosteal reaction. Joint spaces preserved. Small calcaneal osteophytes. Vascular calcification. Soft tissue swelling mostly over the dorsum of the foot may reflect cellulitis. No soft tissue gas.    IMPRESSION: No acute or destructive osseous pathology.    If there is clinical suspicion of osteomyelitis consider bone scan or MRI        EXAM:  US PHYSIOL LWR EXT 3+ LEV BI                          PROCEDURE DATE:  10/16/2021      INTERPRETATION:  Clinical indication: Ischemic toe    COMPARISON: None    FINDINGS: There are pulsatile waveforms bilaterally. Segmental pressures could not be obtained due to calcified, noncompressible vessels. Therefore, ABIs could not be calculated.    IMPRESSION: ABIs could not be calculated due to calcified, noncompressible vessels.      Culture  Specimen Source: .Surgical Swab R foot plantar wound (10.16.21 @ 02:28) Culture Results:   Few Streptococcus agalactiae (Group B) isolated   Group B streptococci are susceptible to ampicillin,   penicillin and cefazolin, but may be resistant to   erythromycin and clindamycin.   Recommendations for intrapartum prophylaxis for Group B   streptococci are penicillin or ampicillin. (10.16.21 @ 02:28)

## 2021-10-26 NOTE — BRIEF OPERATIVE NOTE - NSICDXBRIEFPREOP_GEN_ALL_CORE_FT
PRE-OP DIAGNOSIS:  Toe osteomyelitis, right 20-Oct-2021 14:17:44 2nd & 3rd proximal phalanx Right Eleni An  
PRE-OP DIAGNOSIS:  Toe osteomyelitis, right 20-Oct-2021 14:17:44 2nd & 3rd proximal phalanx Right Eleni An  
PRE-OP DIAGNOSIS:  PAD (peripheral artery disease) 19-Oct-2021 19:33:20  Tegan Avila

## 2021-10-26 NOTE — PROGRESS NOTE ADULT - ASSESSMENT
Right foot and leg cellulitis  Osteomyelitis of right foot - s/p amputation of 2nd and 3rd toes    Plan:  ·	Continue Vancomycin 1000mgs iv q12hrs   ·	Continue meropenem 1gm IV q8h for now  ·	awaiting pathology results   ·	OR today for secondary closure       Right foot and leg cellulitis  Osteomyelitis of right foot - s/p amputation of 2nd and 3rd toes    Plan:  ·	Continue Vancomycin 1000mgs iv q12hrs   ·	Continue meropenem 1gm IV q8h for now  ·	awaiting pathology results   ·	OR today for secondary closure

## 2021-10-26 NOTE — PROGRESS NOTE ADULT - PROBLEM SELECTOR PLAN 1
Cont Vancomycin and Meropenem (Cefepime discontinued due to Tissue Cx. E. Coli ESBL 10/24)  Vanc Trough 10/25 - 13.4  Dr Mesa - ID consulted  Cx: Few Streptococcus agalactiae (Group B) isolated sensitive to Ampicillin and Penicillin  MRI:  Findings favored to represent osteomyelitis involving second and third digit proximal phalanges.  Extensive overlying soft tissue infection involving the second and third digits with surrounding edema and phlegmon, may represent abscess-in-formation. Additionally, multiple rounded foci of hypointensity in soft tissue at the second and third digits may be related to vascular atherosclerosis versus foci of soft tissue gas.  MIGDALIA/PVR shows noncompressible vessels - may benefit form antiplatelet therapy once podiatrically stable  Vascular consulted - Angiogram shows Inline flow to R foot via dominant PT and smaller peroneal with AT occlusion  Podiatry consulted - pt. had 2nd and 3rd digit of R. Foot amputated on 10/20  c/w Abx as per ID  Pain Management: oxycodone 5 mG/acetaminophen 325 mG PRN q6h  Podiatry - Wound closure planned for 2PM today 10/26

## 2021-10-27 ENCOUNTER — TRANSCRIPTION ENCOUNTER (OUTPATIENT)
Age: 51
End: 2021-10-27

## 2021-10-27 LAB
ANION GAP SERPL CALC-SCNC: 7 MMOL/L — SIGNIFICANT CHANGE UP (ref 5–17)
BUN SERPL-MCNC: 20 MG/DL — HIGH (ref 7–18)
CALCIUM SERPL-MCNC: 9.2 MG/DL — SIGNIFICANT CHANGE UP (ref 8.4–10.5)
CHLORIDE SERPL-SCNC: 102 MMOL/L — SIGNIFICANT CHANGE UP (ref 96–108)
CO2 SERPL-SCNC: 27 MMOL/L — SIGNIFICANT CHANGE UP (ref 22–31)
CREAT SERPL-MCNC: 1.11 MG/DL — SIGNIFICANT CHANGE UP (ref 0.5–1.3)
GLUCOSE BLDC GLUCOMTR-MCNC: 151 MG/DL — HIGH (ref 70–99)
GLUCOSE BLDC GLUCOMTR-MCNC: 169 MG/DL — HIGH (ref 70–99)
GLUCOSE BLDC GLUCOMTR-MCNC: 197 MG/DL — HIGH (ref 70–99)
GLUCOSE BLDC GLUCOMTR-MCNC: 201 MG/DL — HIGH (ref 70–99)
GLUCOSE SERPL-MCNC: 164 MG/DL — HIGH (ref 70–99)
HCT VFR BLD CALC: 32.9 % — LOW (ref 39–50)
HGB BLD-MCNC: 10.9 G/DL — LOW (ref 13–17)
MCHC RBC-ENTMCNC: 29.5 PG — SIGNIFICANT CHANGE UP (ref 27–34)
MCHC RBC-ENTMCNC: 33.1 GM/DL — SIGNIFICANT CHANGE UP (ref 32–36)
MCV RBC AUTO: 88.9 FL — SIGNIFICANT CHANGE UP (ref 80–100)
NRBC # BLD: 0 /100 WBCS — SIGNIFICANT CHANGE UP (ref 0–0)
PLATELET # BLD AUTO: 338 K/UL — SIGNIFICANT CHANGE UP (ref 150–400)
POTASSIUM SERPL-MCNC: 4.4 MMOL/L — SIGNIFICANT CHANGE UP (ref 3.5–5.3)
POTASSIUM SERPL-SCNC: 4.4 MMOL/L — SIGNIFICANT CHANGE UP (ref 3.5–5.3)
RBC # BLD: 3.7 M/UL — LOW (ref 4.2–5.8)
RBC # FLD: 12.3 % — SIGNIFICANT CHANGE UP (ref 10.3–14.5)
SODIUM SERPL-SCNC: 136 MMOL/L — SIGNIFICANT CHANGE UP (ref 135–145)
SURGICAL PATHOLOGY STUDY: SIGNIFICANT CHANGE UP
VANCOMYCIN TROUGH SERPL-MCNC: 17 UG/ML — SIGNIFICANT CHANGE UP (ref 10–20)
WBC # BLD: 10.04 K/UL — SIGNIFICANT CHANGE UP (ref 3.8–10.5)
WBC # FLD AUTO: 10.04 K/UL — SIGNIFICANT CHANGE UP (ref 3.8–10.5)

## 2021-10-27 PROCEDURE — 99233 SBSQ HOSP IP/OBS HIGH 50: CPT | Mod: GC

## 2021-10-27 PROCEDURE — 73620 X-RAY EXAM OF FOOT: CPT | Mod: 26,RT

## 2021-10-27 RX ORDER — OXYCODONE AND ACETAMINOPHEN 5; 325 MG/1; MG/1
1 TABLET ORAL EVERY 4 HOURS
Refills: 0 | Status: DISCONTINUED | OUTPATIENT
Start: 2021-10-27 | End: 2021-10-29

## 2021-10-27 RX ORDER — SACUBITRIL AND VALSARTAN 24; 26 MG/1; MG/1
1 TABLET, FILM COATED ORAL
Qty: 60 | Refills: 0
Start: 2021-10-27 | End: 2021-11-25

## 2021-10-27 RX ORDER — OXYCODONE AND ACETAMINOPHEN 5; 325 MG/1; MG/1
1 TABLET ORAL EVERY 6 HOURS
Refills: 0 | Status: DISCONTINUED | OUTPATIENT
Start: 2021-10-27 | End: 2021-10-27

## 2021-10-27 RX ORDER — ONDANSETRON 8 MG/1
4 TABLET, FILM COATED ORAL EVERY 6 HOURS
Refills: 0 | Status: DISCONTINUED | OUTPATIENT
Start: 2021-10-27 | End: 2021-10-29

## 2021-10-27 RX ORDER — HYDROMORPHONE HYDROCHLORIDE 2 MG/ML
0.5 INJECTION INTRAMUSCULAR; INTRAVENOUS; SUBCUTANEOUS EVERY 6 HOURS
Refills: 0 | Status: DISCONTINUED | OUTPATIENT
Start: 2021-10-27 | End: 2021-10-27

## 2021-10-27 RX ORDER — ACETAMINOPHEN 500 MG
650 TABLET ORAL EVERY 6 HOURS
Refills: 0 | Status: DISCONTINUED | OUTPATIENT
Start: 2021-10-27 | End: 2021-10-29

## 2021-10-27 RX ORDER — MORPHINE SULFATE 50 MG/1
2 CAPSULE, EXTENDED RELEASE ORAL EVERY 6 HOURS
Refills: 0 | Status: DISCONTINUED | OUTPATIENT
Start: 2021-10-27 | End: 2021-10-29

## 2021-10-27 RX ADMIN — OXYCODONE AND ACETAMINOPHEN 1 TABLET(S): 5; 325 TABLET ORAL at 11:55

## 2021-10-27 RX ADMIN — MEROPENEM 200 MILLIGRAM(S): 1 INJECTION INTRAVENOUS at 06:05

## 2021-10-27 RX ADMIN — Medication 300 MILLIGRAM(S): at 06:05

## 2021-10-27 RX ADMIN — HYDROMORPHONE HYDROCHLORIDE 0.5 MILLIGRAM(S): 2 INJECTION INTRAMUSCULAR; INTRAVENOUS; SUBCUTANEOUS at 09:36

## 2021-10-27 RX ADMIN — OXYCODONE AND ACETAMINOPHEN 1 TABLET(S): 5; 325 TABLET ORAL at 20:30

## 2021-10-27 RX ADMIN — OXYCODONE AND ACETAMINOPHEN 1 TABLET(S): 5; 325 TABLET ORAL at 20:02

## 2021-10-27 RX ADMIN — CHLORHEXIDINE GLUCONATE 1 APPLICATION(S): 213 SOLUTION TOPICAL at 05:47

## 2021-10-27 RX ADMIN — OXYCODONE AND ACETAMINOPHEN 1 TABLET(S): 5; 325 TABLET ORAL at 11:25

## 2021-10-27 RX ADMIN — HYDROMORPHONE HYDROCHLORIDE 0.5 MILLIGRAM(S): 2 INJECTION INTRAMUSCULAR; INTRAVENOUS; SUBCUTANEOUS at 10:06

## 2021-10-27 RX ADMIN — Medication 0.5 MILLIGRAM(S): at 05:45

## 2021-10-27 NOTE — DISCHARGE NOTE PROVIDER - CARE PROVIDER_API CALL
Mir Fairchild)  Cardiology  [address] [place] [address]  Phone: [phone]  Fax: [phone]  Follow Up Time: 2 weeks    ALLEN MINOR  [address] [address]  Phone: [phone]  Fax: [phone]  Follow Up Time: 2 weeks

## 2021-10-27 NOTE — PROGRESS NOTE ADULT - PROBLEM SELECTOR PLAN 1
Cont Vancomycin and Meropenem (Cefepime discontinued due to Tissue Cx. E. Coli ESBL 10/24)  Vanc Trough 10/27 - 17.0  Dr Mesa - ID consulted  Cx: Few Streptococcus agalactiae (Group B) isolated sensitive to Ampicillin and Penicillin  MRI:  Findings favored to represent osteomyelitis involving second and third digit proximal phalanges.  Extensive overlying soft tissue infection involving the second and third digits with surrounding edema and phlegmon, may represent abscess-in-formation. Additionally, multiple rounded foci of hypointensity in soft tissue at the second and third digits may be related to vascular atherosclerosis versus foci of soft tissue gas.  MIGDALIA/PVR shows noncompressible vessels - may benefit form antiplatelet therapy once podiatrically stable  Vascular consulted - Angiogram shows Inline flow to R foot via dominant PT and smaller peroneal with AT occlusion  Podiatry consulted - pt. had 2nd and 3rd digit of R. Foot amputated on 10/20  c/w Abx as per ID  Pain Management: oxycodone 5 mG/acetaminophen 325 mG PRN q6h  Podiatry completed primary wound closure 10/26  f/u Cont Vancomycin and Meropenem (Cefepime discontinued due to Tissue Cx. E. Coli ESBL 10/24)  Vanc Trough 10/27 - 17.0  Dr Mesa - ID consulted  Cx: Few Streptococcus agalactiae (Group B) isolated sensitive to Ampicillin and Penicillin  MRI:  Findings favored to represent osteomyelitis involving second and third digit proximal phalanges.  Extensive overlying soft tissue infection involving the second and third digits with surrounding edema and phlegmon, may represent abscess-in-formation. Additionally, multiple rounded foci of hypointensity in soft tissue at the second and third digits may be related to vascular atherosclerosis versus foci of soft tissue gas.  MIGDALIA/PVR shows noncompressible vessels - may benefit form antiplatelet therapy once podiatrically stable  Vascular consulted - Angiogram shows Inline flow to R foot via dominant PT and smaller peroneal with AT occlusion  Podiatry consulted - pt. had 2nd and 3rd digit of R. Foot amputated on 10/20  c/w Abx as per ID  Pain Management: oxycodone 5 mG/acetaminophen 325 mG PRN q6h  Podiatry completed primary wound closure 10/26  Surgical Pathology is negative for osteomyelitis, patient can be discharged without any antibiotics

## 2021-10-27 NOTE — DISCHARGE NOTE PROVIDER - NSFOLLOWUPCLINICS_GEN_ALL_ED_FT
[facility]  Podiatry/Wound Care  [address] [place] [address]  Phone: [phone]  Fax: [phone]  Follow Up Time: 1 week

## 2021-10-27 NOTE — PHYSICAL THERAPY INITIAL EVALUATION ADULT - PATIENT/FAMILY/SIGNIFICANT OTHER GOALS STATEMENT, PT EVAL
Patient stated that he feels better with the pain(after IV tylenol) but feels fatigued with OOB activities
Pt. states wants to walk on both feet

## 2021-10-27 NOTE — PROGRESS NOTE ADULT - SUBJECTIVE AND OBJECTIVE BOX
PGY-1 Progress Note discussed with attending    PAGER #: [[phone]] TILL 5:00 PM  PLEASE CONTACT ON CALL TEAM:  - On Call Team (Please refer to Quintin) FROM 5:00 PM - 8:30PM  - Nightfloat Team FROM 8:30 -7:30 AM    CHIEF COMPLAINT & BRIEF HOSPITAL COURSE:  51 year old male with PMH CVA (10 years ago, no deficits), HTN, DM, CHF (EF 30-35% 2/21), presented with right foot swelling. Patient stated two weeks ago he noticed bleeding in his shoes and found a staple stuck to the bottom of his feet. He didn't notice it before because he states he doesn't have any sensation in his feet. He went to urgent care, where they prescribed him Keflex Q8hrs. Patient states the foot started to swell more so he returned to urgent care yesterday where they gave him a prescription of clindamycin. Patient this morning started to endorse chills, nausea, but denies any fever, vomiting, chest pain, SOB, abdominal pain, or changes in bowel habits.  Patient was admitted in February due to covid, and was found to have CHF and DM. Patient has not been complaint with any of his medications and states he just takes Lasix and Xanax. MRI 10/18 shows proximal OM of 2nd and 3rd digit of R. Foot. Angiogram 10/19 shows Inline flow to R foot via dominant PT and smaller peroneal with AT occlusion. Pt. had amputation of the 2nd and 3rd digit on the R. foot on 10/20. Pt. Tissue Cx shows E.coli ESBL. Given Vanc. and Meropenem. Planned for primary closure of wound on 10/26.     INTERVAL HPI/OVERNIGHT EVENTS:   Pt. s/p 2nd and 3rd digit amputation of R. Foot POD#7. Pt. had primary closure of wound yesterday with podiatry. Pt continues to be non-compliant with CHF medicines due to side effects. Does not want to try other options. Describes intense, throbbing R. foot pain. Pt. had trouble sleeping last night and asked for xanax. Pt. displays high anxiety today regarding his medical problem list.    MEDICATIONS  (STANDING):  aspirin enteric coated 81 milliGRAM(s) Oral daily  atorvastatin 80 milliGRAM(s) Oral at bedtime  chlorhexidine 2% Cloths 1 Application(s) Topical <User Schedule>  enoxaparin Injectable 40 milliGRAM(s) SubCutaneous daily  influenza   Vaccine 0.5 milliLiter(s) IntraMuscular once  insulin lispro (ADMELOG) corrective regimen sliding scale   SubCutaneous Before meals and at bedtime  insulin lispro Injectable (ADMELOG) 2 Unit(s) SubCutaneous three times a day before meals  melatonin 3 milliGRAM(s) Oral at bedtime  meropenem  IVPB 1000 milliGRAM(s) IV Intermittent every 8 hours  polyethylene glycol 3350 17 Gram(s) Oral daily  sacubitril 24 mG/valsartan 26 mG 1 Tablet(s) Oral two times a day  senna 2 Tablet(s) Oral at bedtime  vancomycin  IVPB 1000 milliGRAM(s) IV Intermittent every 12 hours    MEDICATIONS  (PRN):  ALPRAZolam 0.5 milliGRAM(s) Oral two times a day PRN Anxiety  ondansetron Injectable 4 milliGRAM(s) IV Push every 6 hours PRN Nausea and/or Vomiting    REVIEW OF SYSTEMS:  CONSTITUTIONAL: No fever, chills, weight loss, + fatigue  RESPIRATORY: No dry cough, Denies wheezing, chills or hemoptysis; No shortness of breath  CARDIOVASCULAR: No chest pain, palpitations, dizziness, or leg swelling  GASTROINTESTINAL: No abdominal pain. No vomiting, or hematemesis; No diarrhea or constipation. No melena or hematochezia.  GENITOURINARY: No dysuria or hematuria, urinary frequency  NEUROLOGICAL: No headaches, memory loss, loss of strength  MSK: Right foot pain, throbbing 10/10  SKIN: Slightly bruising and discoloration on both anterior legs    Vital Signs Last 24 Hrs  T(C): 36.5 (25 Oct 2021 06:13), Max: 36.5 (25 Oct 2021 06:13)  T(F): 97.7 (25 Oct 2021 06:13), Max: 97.7 (25 Oct 2021 06:13)  HR: 88 (25 Oct 2021 06:13) (88 - 99)  BP: 107/72 (25 Oct 2021 06:13) (107/72 - 111/71)  BP(mean): 79 (24 Oct 2021 13:50) (79 - 79)  RR: 17 (25 Oct 2021 06:13) (16 - 17)  SpO2: 100% (25 Oct 2021 06:13) (100% - 100%)    PHYSICAL EXAMINATION:  GENERAL: NAD, well built  HEAD:  Atraumatic, Normocephalic  EYES:  conjunctiva and sclera clear  NECK: Supple, No JVD, Normal thyroid  CHEST/LUNG: Lungs CTA. No rales, rhonchi, wheezing, or rubs  HEART: Regular rate and rhythm; No murmurs, rubs, or gallops  ABDOMEN: Soft, Nontender, Nondistended; Bowel sounds present  NERVOUS SYSTEM:  Alert & Oriented X3  EXTREMITIES:  1+ pitting edema b/l 2+ Peripheral Pulses, No clubbing, cyanosis  SKIN: s/p R. Foot 2nd and 3rd digit amputation, wound dressed.                                                       10.9   10.04 )-----------( 338      ( 27 Oct 2021 04:41 )             32.9         136  |  102  |  20<H>  ----------------------------<  164<H>  4.4   |  27  |  1.11    Ca    9.2      27 Oct 2021 04:41  Phos  3.5     10-26  Mg     2.3     10-26    Vancomycin Level, Trough: 17.0 (10.27.21 @ 04:40)       CAPILLARY BLOOD GLUCOSE    POCT Blood Glucose.: 201 mg/dL (27 Oct 2021 07:38)  POCT Blood Glucose.: 271 mg/dL (26 Oct 2021 21:04)  POCT Blood Glucose.: 137 mg/dL (26 Oct 2021 17:33)  POCT Blood Glucose.: 141 mg/dL (26 Oct 2021 11:40)        RADIOLOGY & ADDITIONAL TESTS:    EXAM:  US PHYSIOL LWR EXT 3+ LEV BI                        PROCEDURE DATE:  10/16/2021    INTERPRETATION:  Clinical indication: Ischemic toe  COMPARISON: None  FINDINGS: There are pulsatile waveforms bilaterally. Segmental pressures could not be obtained due to calcified, noncompressible vessels. Therefore, ABIs could not be calculated.  IMPRESSION: ABIs could not be calculated due to calcified, noncompressible vessels.    ------------------------------------------------------------------------    EXAM:  XR FOOT 2 VIEWS RT                        PROCEDURE DATE:  10/15/2021    INTERPRETATION:  Cellulitis swollen right foot.  3 views right foot.  No fracture or dislocation. No focal bone lysis or unusual periosteal reaction. Joint spaces preserved. Small calcaneal osteophytes. Vascular calcification. Soft tissue swelling mostly over the dorsum of the foot may reflect cellulitis. No soft tissue gas.  IMPRESSION: No acute or destructive osseous pathology.  If there is clinical suspicion of osteomyelitis consider bone scan or MRI    ------------------------------------------------------------------------    EXAM:  MR FOOT RT                        PROCEDURE DATE:  10/18/2021    INTERPRETATION:  EXAMINATION: MR FOOT RIGHT  CLINICAL INDICATION: Evaluate for osteomyelitis  COMPARISON: Radiographs dated 10/15/2021  TECHNIQUE: Multiplanar, multi-sequence MRI of the right foot was performed without intravenous contrast.  INTERPRETATION:  Bones and soft tissues: There is no fracture. There is patchy STIR hyperintensity within the second digit proximal phalanx with mild patchy T1 hypointensity seen on the short axis TI images. Similar finding is seen at the base of the third digit proximal phalanx. The findings are favored to represent early osteomyelitis, with reactive osteitis considered less likely.  There is extensive surrounding soft tissue edema and phlegmon at the second and third digits which may represent abscess-in -formation with blistering of the overlying skin. Multiple foci of hypointensity seen on series 6 and 7 at the second and third digits may be related to vascular atherosclerosis versus foci of soft tissue gas. Advise radiographic correlation (prior radiographs are dated 10/15/2021).  Muscles: There is extensive edema within the intrinsic muscles of the foot which may be seen in neuropathy.  IMPRESSION:  1.  Findings favored to represent osteomyelitis involving second and third digit proximal phalanges.  2.  Extensive overlying soft tissue infection involving the second and third digits with surrounding edema and phlegmon, may represent abscess-in-formation. Additionally, multiple rounded foci of hypointensity in soft tissue at the second and third digits may be related to vascular atherosclerosis versus foci of soft tissue gas. Advise radiographic correlation (prior radiographs are dated 10/15/2021).    ------------------------------------------------------------------------    PROCEDURE: Transthoracic echocardiogram with 2-D, M-Mode  and complete spectral and color flow Doppler.  Study Date: 10/19/2021  INDICATION: Cardiomyopathy, unspecified (I42.9)  HISTORY:    DIMENSIONS:  Dimensions:     Normal Values:  LA:     4.9 cm    2.0 - 4.0 cm  Ao:     3.9 cm    2.0 - 3.8 cm  SEPTUM: 0.9 cm    0.6 - 1.2 cm  PWT:    1.0 cm    0.6 - 1.1 cm  LVIDd:  5.9 cm    3.0 - 5.6 cm  LVIDs:  5.9 cm    1.8 - 4.0 cm    Derived Variables:  LVMI: 101 g/m2  RWT: 0.33  Ejection Fraction Visual Estimate: 30-35 %  Ejection Fraction Stevens: 33 %    OBSERVATIONS:  Mitral Valve: Normal mitral valve. Mild mitral  regurgitation.  Aortic Root: Aortic Root: 3.9 cm.    Aortic Valve: Calcified trileaflet aortic valve with normal  opening. Mild aortic regurgitation.  Left Atrium: Normal left atrium.  LA volume index = 31  cc/m2.  Left Ventricle: Dilated lv with severe segmental left  ventricular systolic dysfunction.The distal  septum,apex,inferiorand infero-lateral walls are  hypokinetic EF=30-35%. Normal left ventricular internal  dimensions and wall thicknesses. Grade I diastolic  dysfunction (Impaired relaxation, mild).  Right Heart: Normal right atrium. Normal right ventricular  size and systolic function (TAPSE  1.7cm). There is mild  tricuspid regurgitation. Normal pulmonic valve.  Pericardium/PleuraNormal pericardium with no pericardial  effusion.  Hemodynamic: RV systolic pressure is severely increased at  65 mm Hg.    CONCLUSIONS:  1. Normal mitral valve. Mild mitral regurgitation.  2. Calcified trileaflet aortic valve with normal opening.  Mild aortic regurgitation.  3. Aortic Root: 3.9 cm.    4. Normal left atrium.  LA volume index = 31 cc/m2.  5. Normal left ventricular internal dimensions and wall  thicknesses.  6. Dilated lv with severe segmental left ventricular  systolic dysfunction.The distal septum,apex,inferiorand  infero-lateral walls are hypokinetic EF=30-35%.  7. Grade I diastolic dysfunction (Impaired relaxation,  mild).  8. Normal right atrium.  9. Normal right ventricular size and systolic function  (TAPSE  1.7cm).  10. RV systolic pressure is severely increased at  65 mm  Hg.  11. There is mild tricuspid regurgitation.  12. Normal pulmonic valve.  13. Normal pericardium with no pericardial effusion.    ------------------------------------------------------------------------    EXAM:  XR FOOT COMP MIN 3 VIEWS RT                        PROCEDURE DATE:  10/20/2021    INTERPRETATION:  Right foot  HISTORY: Postop  COMPARISON: 10/15/2021   Two views of the right foot show amputation of the second and third toes. Wound VAC device is present. The joint spaces are maintained.  IMPRESSION: Postoperative changes.

## 2021-10-27 NOTE — PHYSICAL THERAPY INITIAL EVALUATION ADULT - MANUAL MUSCLE TESTING RESULTS, REHAB EVAL
within the available limits/no strength deficits were identified
grossly graded 4-/5 to both UE; 4- to both LE

## 2021-10-27 NOTE — PHYSICAL THERAPY INITIAL EVALUATION ADULT - DIAGNOSIS, PT EVAL
Patient presented with fatigue and impairments in balance with NWB precautions on R LE
Pt. w/ limited mobility due to (R) foot ulcer and NWB status.

## 2021-10-27 NOTE — DISCHARGE NOTE PROVIDER - HOSPITAL COURSE
Patient is a 52 y/o male who presents with worsening right foot pain and wound x2 weeks for which he was prescribed keflex course which he completed and a clindamycin course which he just started. Patient has PMH significant for DM, CHF. Patient reports that a "contractor grade staple" punctured his foot and he didn't realize until the next morning when he couldn't bear weight. He reports he is up to date on his tetanus shot. He reports chills but denies fever as he "did not check it" and ascending leg pain at times. Denies shortness of breath, chest pain or pressure, nausea or vomiting.     Pt. was started on vancomycin and cefepime for cellulitis of the R. foot. MIGDALIA/PVR showed noncompressible vessels. MRI was then performed shows proximal Osteomyelitis of 2nd and 3rd digit of R. Foot. Angiogram performed by Vascular surgery showed Inline flow to R foot via dominant PT and smaller peroneal with AT occlusion. Podiatry performed amputation of the 2nd and 3rd digit on the R. foot on 10/20. Tissue cx. shows E.coli ESBL, pt. resumed vancomycin and meropenem (dc. cefepime). Pt. underwent delayed primary wound closure by Podiatry 10/26. Bone biopsy showed no evidence of osteomyelitis thus antibiotics were discontinued.     Pt has hx of Systolic HF, Echo showed EF of 30-35%; GIDD. Pt. has been noncompliant with CHF, DM, and HTN drugs throughout hospital course. Pt. tried Entresto as alternative to Carvedilol and reported side effects of pain and fatigue. Thus did not comply with that medication.    Pt. had consistently elevated glucose levels throughout stay (>150) and refuses Insulin, Sliding Scale, and Metformin. A1C of 7 - pt. says he would rather maintain dietary changes    Given patient's improved clinical status and current hemodynamic stability, decision was made to discharge the patient.  Patient is stable for discharge per attending and is advised to follow up with PCP as outpatient  Please refer to patient's complete medical chart with documents for a full hospital course, for this is only a brief summary.

## 2021-10-27 NOTE — PROGRESS NOTE ADULT - SUBJECTIVE AND OBJECTIVE BOX
51y Male is under our care for     REVIEW OF SYSTEMS:  [  ] Not able to elicit  General:	  Chest:	  GI:	  :  Skin:	  Musculoskeletal:	  Neuro:	    MEDS:  meropenem  IVPB 2000 milliGRAM(s) IV Intermittent every 8 hours  vancomycin  IVPB 1500 milliGRAM(s) IV Intermittent every 12 hours  vancomycin  IVPB        ALLERGIES: Allergies    Nuts (Unknown)  tetracycline (Hives)    Intolerances        VITALS:  Vital Signs Last 24 Hrs  T(C): 36.4 (27 Oct 2021 05:43), Max: 36.8 (26 Oct 2021 17:16)  T(F): 97.5 (27 Oct 2021 05:43), Max: 98.2 (26 Oct 2021 17:16)  HR: 99 (27 Oct 2021 05:43) (72 - 99)  BP: 120/83 (27 Oct 2021 05:43) (95/63 - 122/87)  BP(mean): 73 (26 Oct 2021 17:46) (73 - 78)  RR: 16 (27 Oct 2021 05:43) (15 - 20)  SpO2: 100% (27 Oct 2021 05:43) (97% - 100%)      PHYSICAL EXAM:  HEENT:  Neck:  Respiratory:  Cardiovascular:  Gastrointestinal:  Extremities:  Skin:  Ortho:  Neuro:    LABS/DIAGNOSTIC TESTS:                        10.9   10.04 )-----------( 338      ( 27 Oct 2021 04:41 )             32.9     WBC Count: 10.04 K/uL (10-27 @ 04:41)  WBC Count: 6.89 K/uL (10-26 @ 08:54)  WBC Count: 9.09 K/uL (10-24 @ 12:37)    10-27    136  |  102  |  20<H>  ----------------------------<  164<H>  4.4   |  27  |  1.11    Ca    9.2      27 Oct 2021 04:41  Phos  3.5     10-26  Mg     2.3     10-26        CULTURES:   .Tissue right 3rd metatarsal  10-20 @ 21:21   Rare Escherichia coli ESBL  Rare Cutibacterium acnes "Susceptibilities not performed"  --  Escherichia coli ESBL      .Tissue right 2nd metatarsal  10-20 @ 21:20   Few Escherichia coli ESBL  --  Escherichia coli ESBL      .Surgical Swab R foot plantar wound  10-16 @ 02:28   Numerous Finegoldia magna "Susceptibilities not performed"  Few Staphylococcus aureus  Few Streptococcus agalactiae (Group B) isolated  Group B streptococci are susceptible to ampicillin,  penicillin and cefazolin, but may be resistant to  erythromycin and clindamycin.  Recommendations for intrapartum prophylaxis for Group B  streptococci are penicillin or ampicillin.  --  Staphylococcus aureus        RADIOLOGY:  no new studies 51y Male is under our care for right leg cellulitis.  Patient was seen laying comfortably in bed with no acute distress.  He is s/p complete amputation of right 2nd and 3rd toes with delayed primary closure yesterday.  He reports mild right foot pain, remains afebrile and WBC count is WNL.  Patients surgical pathology report does not show any evidence of osteomyelitis.      REVIEW OF SYSTEMS:  [  ] Not able to elicit  General: no fevers no malaise  Chest: no cough no sob  GI: no nvd  : no urinary sxs   Skin: no rashes  Musculoskeletal: right foot pain  Neuro: no ha's no dizziness     MEDS:  meropenem  IVPB 2000 milliGRAM(s) IV Intermittent every 8 hours  vancomycin  IVPB 1500 milliGRAM(s) IV Intermittent every 12 hours  vancomycin  IVPB        ALLERGIES: Allergies    Nuts (Unknown)  tetracycline (Hives)    Intolerances        VITALS:  Vital Signs Last 24 Hrs  T(C): 36.4 (27 Oct 2021 05:43), Max: 36.8 (26 Oct 2021 17:16)  T(F): 97.5 (27 Oct 2021 05:43), Max: 98.2 (26 Oct 2021 17:16)  HR: 99 (27 Oct 2021 05:43) (72 - 99)  BP: 120/83 (27 Oct 2021 05:43) (95/63 - 122/87)  BP(mean): 73 (26 Oct 2021 17:46) (73 - 78)  RR: 16 (27 Oct 2021 05:43) (15 - 20)  SpO2: 100% (27 Oct 2021 05:43) (97% - 100%)      PHYSICAL EXAM:  HEENT: n/a  Neck: supple no LN's   Respiratory: lungs clear no rales  Cardiovascular: S1 S2 reg no murmurs  Gastrointestinal: +BS with soft, nondistended abdomen; nontender  Extremities: no edema, right foot dressing dry and intact  Skin: no rashes  Ortho: n/a  Neuro: AAO x 4    LABS/DIAGNOSTIC TESTS:                        10.9   10.04 )-----------( 338      ( 27 Oct 2021 04:41 )             32.9     WBC Count: 10.04 K/uL (10-27 @ 04:41)  WBC Count: 6.89 K/uL (10-26 @ 08:54)  WBC Count: 9.09 K/uL (10-24 @ 12:37)    10-27    136  |  102  |  20<H>  ----------------------------<  164<H>  4.4   |  27  |  1.11    Ca    9.2      27 Oct 2021 04:41  Phos  3.5     10-26  Mg     2.3     10-26        CULTURES:   .Tissue right 3rd metatarsal  10-20 @ 21:21   Rare Escherichia coli ESBL  Rare Cutibacterium acnes "Susceptibilities not performed"  --  Escherichia coli ESBL      .Tissue right 2nd metatarsal  10-20 @ 21:20   Few Escherichia coli ESBL  --  Escherichia coli ESBL      .Surgical Swab R foot plantar wound  10-16 @ 02:28   Numerous Finegoldia magna "Susceptibilities not performed"  Few Staphylococcus aureus  Few Streptococcus agalactiae (Group B) isolated  Group B streptococci are susceptible to ampicillin,  penicillin and cefazolin, but may be resistant to  erythromycin and clindamycin.  Recommendations for intrapartum prophylaxis for Group B  streptococci are penicillin or ampicillin.  --  Staphylococcus aureus        RADIOLOGY:    Surgical Pathology Report:   ACCESSION No: [id number]   Patient: CLARIBEL REICH   Accession: [id number]   Collected Date/Time: 10/20/2021 13:45 EDT   Received Date/Time: 10/21/2021 08:00 EDT   Surgical Pathology Report - Auth (Verified)   Specimen(s) Submitted   1 Right 2nd metatarsal   2 Right 3rd metatarsal   3 Right foot 2nd and 3rd digit   Final Diagnosis   1. Right second metatarsal bone; biopsy:   Articular cartilage and bone with periarticularsoft tissue   2. Right third metatarsal bone; biopsy:   Scant articular bone and cartilage with periosseous soft tissue   3. Right second and third toes; amputation:   - Gangrenous necrosis with suppuration involving skin and soft tissue   - Bone without osteomyelitis   Verified by: Rhonda Hernández M.D.   (Electronic Signature)   Reported on: 10/27/21 10:23 EDT, [address]   Phone: [phone] Fax: [phone]   _________________________________________________________________   Clinical Information   n/a   Gross Description   1. Received: In formalin labeled "right 2nd metatarsal" , consisting of   an   irregular fragment of bone   Dimensions: 0.4 x 0.4 x 0.2 cm   Submitted: Entirely in one cassette: 1A   2. Received: In formalin labeled "right 3rd metatarsal" , consisting of   an   irregular fragment of bone   Dimensions: 0.4 x 0.3 x 0.2 cm   Submitted: Entirely in one cassette: 2A   3. Received: In formalin labeled "right foot 2nd and 3rd digits" ,   consisting of to necrotic digits, each with a thickened yellow-grey toe   nail   Dimensions: The digits are 4.5 x 2.0 cm and 5.5 x 1.8 cm   Additional Comments: The digits are red-tan with eschar areas, and with   skin peeling. The proximal resection bone margin is inked. Both digits   are longitudinally bisected, revealing hemorrhagic bone, surrounded by   opaque soft tissue   Submitted: Representative sections in 5 cassettes:   3A: Distal and midportion of third digit   3B: Inked proximal resection margin of third digit   3C: Distal and midportion of second digit   3D: Inked proximal resection margin of second digit   3E: Representative section of additional bone   GANGA Estes. 10/27/2021 08:21 AM   Disclaimer   In addition to other data that may appear on the specimen containers, all   labels have been inspected to confirm the presence of the patient's name   and date of birth.   Histological processing performed at [facility], [place] [address]. (10.20.21 @ 13:45)

## 2021-10-27 NOTE — DISCHARGE NOTE PROVIDER - PROVIDER TOKENS
PROVIDER:[TOKEN:[[id number]],FOLLOWUP:[2 weeks]],PROVIDER:[TOKEN:[[id number]],FOLLOWUP:[2 weeks]]

## 2021-10-27 NOTE — PROGRESS NOTE ADULT - ATTENDING COMMENTS
Patient was seen and examined at bedside   Complains of pain in RLE     Vitals noted    P/E:  As above     Labs noted    A/P:  Delayed Wound closure done yesterday by podiatry, wound vac and wound care instructions on discharge  Clear margin on surgical pathology result   Can stop antibiotics   Pending discharge after arrangement of wound vac

## 2021-10-27 NOTE — PROGRESS NOTE ADULT - ASSESSMENT
A:  s/p 2nd and 3rd right toes amputation 10/20  Osteomyelitis R 2nd & 3rd proximal phalanges  Cellulitis RLE     P:  Pt seen and evaluated  s/p 2nd and 3rd right toes amputation (10/20)  s/p delayed primary closure (10/26) - partial closure, wasn't able to fully close due to nonviable skin  Surgical path reviewed - no OM   Cleansed surgical site with saline  Applied wound vac and DSD  Recommend nonWB to right foot, however can partially weight bear on heel as tolerated   Pod plan: pt stable from podiatry standpoint  Continue abx per primary team  Upon DC, pt to f/u outpatient in clinic [address]. Please call [phone] to make an appt.   WCO: Pt to dc on a wound vac. Apply adaptic first, then black foam to the opened wound, secure it with tegaderm. Apply clean 4x4 gauze and wrap it with paula, ACE bandage.   Discussed and evaluated at bedside with attending Dr. Schultz

## 2021-10-27 NOTE — PHYSICAL THERAPY INITIAL EVALUATION ADULT - GENERAL OBSERVATIONS, REHAB EVAL
Patient was seen in supine on bed,+wound vac for R LE
Pt. received seated in his chair; (R) LE w/ edema, wrapped in ace bandage & elevated.

## 2021-10-27 NOTE — PHYSICAL THERAPY INITIAL EVALUATION ADULT - CRITERIA FOR SKILLED THERAPEUTIC INTERVENTIONS
impairments found/functional limitations in following categories/risk reduction/prevention/rehab potential
impairments found/anticipated equipment needs at discharge

## 2021-10-27 NOTE — DISCHARGE NOTE PROVIDER - NSDCFUADDINST_GEN_ALL_CORE_FT
WOUND CARE INSTRUCTIONS: Apply adaptic first, then black foam to the opened wound, secure it with tegaderm. Apply clean 4x4 gauze and wrap it with paula, ACE bandage.

## 2021-10-27 NOTE — PROGRESS NOTE ADULT - SUBJECTIVE AND OBJECTIVE BOX
Podiatry Interval HPI: Pt seen resting in bed s/p 2nd and 3rd toe amputation R foot 10/20 and delayed primary closure 10/26. Pt denies any acute overnight events. Pt denies any constitutional symptoms of N/V/C/F/Sob.     Podiatry HPI: Podiatry consulted regarding a 52 yo male who presents to the ED for a right foot infection. Pt states that he stepped on a brass staple about 2 weeks ago and had no idea it punctured through the skin until the next morning when he started to feel pain. Pt states that he is diabetic so his sensation in his feet is diminished. Pt reports that he went to the urgent care and was prescribed keflex which he finished. He was also told by the urgent care to soak the foot in epsom salt and apply bacitracin cream on it. Pt reports that soaking it made it worse and turned into a infection a few days ago. Pt is complaining 6/10 pain on the right foot. Admits to having chills. Pt denies constitutional symptoms of N/V/C/F/Sob.     HPI: Patient is a 52 y/o male who presents with worsening right foot pain and wound x2 weeks for which he was prescribed keflex course which he completed and a clindamycin course which he just started. Patient has PMH significant for DM, CHF. Patient reports that a "contractor grade staple" punctured his foot and he didn't realize until the next morning when he couldn't bear weight. He reports he is up to date on his tetanus shot. He reports chills but denies fever as he "did not check it" and ascending leg pain at times. Denies shortness of breath, chest pain or pressure, nausea or vomiting.    Medications acetaminophen     Tablet .. 650 milliGRAM(s) Oral every 6 hours PRN  ALPRAZolam 0.5 milliGRAM(s) Oral two times a day PRN  aspirin enteric coated 81 milliGRAM(s) Oral daily  atorvastatin 80 milliGRAM(s) Oral at bedtime  chlorhexidine 2% Cloths 1 Application(s) Topical <User Schedule>  enoxaparin Injectable 40 milliGRAM(s) SubCutaneous daily  HYDROmorphone  Injectable 0.5 milliGRAM(s) IV Push every 6 hours PRN  influenza   Vaccine 0.5 milliLiter(s) IntraMuscular once  insulin lispro (ADMELOG) corrective regimen sliding scale   SubCutaneous Before meals and at bedtime  insulin lispro Injectable (ADMELOG) 2 Unit(s) SubCutaneous three times a day before meals  lactated ringers. 1000 milliLiter(s) IV Continuous <Continuous>  melatonin 3 milliGRAM(s) Oral at bedtime  meropenem  IVPB 2000 milliGRAM(s) IV Intermittent every 8 hours  ondansetron Injectable 4 milliGRAM(s) IV Push every 6 hours PRN  oxycodone    5 mG/acetaminophen 325 mG 1 Tablet(s) Oral every 4 hours PRN  sacubitril 24 mG/valsartan 26 mG 1 Tablet(s) Oral two times a day  senna 2 Tablet(s) Oral at bedtime  vancomycin  IVPB 1500 milliGRAM(s) IV Intermittent every 12 hours  vancomycin  IVPB        FH: No pertinent family history    PMH: CVA (cerebral vascular accident)    HTN (hypertension)    Anxiety    DM (diabetes mellitus)    Chronic CHF    Chronic systolic congestive heart failure       PSH: No pertinent past surgical history    Labs                          10.9   10.04 )-----------( 338      ( 27 Oct 2021 04:41 )             32.9      10-27    136  |  102  |  20<H>  ----------------------------<  164<H>  4.4   |  27  |  1.11    Ca    9.2      27 Oct 2021 04:41  Phos  3.5     10-26  Mg     2.3     10-26    Vital Signs Last 24 Hrs  T(C): 36.7 (27 Oct 2021 13:25), Max: 36.8 (26 Oct 2021 17:16)  T(F): 98 (27 Oct 2021 13:25), Max: 98.2 (26 Oct 2021 17:16)  HR: 99 (27 Oct 2021 13:25) (72 - 99)  BP: 112/82 (27 Oct 2021 13:25) (95/63 - 120/83)  BP(mean): 89 (27 Oct 2021 13:25) (73 - 89)  RR: 16 (27 Oct 2021 13:25) (15 - 20)  SpO2: 100% (27 Oct 2021 13:25) (97% - 100%)  Sedimentation Rate, Erythrocyte: 98 mm/Hr (10-21-21 @ 04:40)  Sedimentation Rate, Erythrocyte: 99 mm/Hr (10-15-21 @ 13:37)         C-Reactive Protein, Serum: 42 mg/L (10-21-21 @ 14:41)  C-Reactive Protein, Serum: 106 mg/L (10-16-21 @ 01:09)  WBC Count: 10.04 K/uL (10-27-21 @ 04:41)    PT/INR - ( 26 Oct 2021 08:54 )   PT: 15.1 sec;   INR: 1.28 ratio      CAPILLARY BLOOD GLUCOSE    POCT Blood Glucose.: 197 mg/dL (27 Oct 2021 11:27)  POCT Blood Glucose.: 201 mg/dL (27 Oct 2021 07:38)  POCT Blood Glucose.: 271 mg/dL (26 Oct 2021 21:04)  POCT Blood Glucose.: 137 mg/dL (26 Oct 2021 17:33)    ROS: All others negative unless otherwise stated in the HPI      PHYSICAL EXAM  GEN: CLARIBEL REICH is a pleasant well-nourished, well developed 51y Male in no acute distress, alert awake, and oriented to person, place and time.   LE Focused:    Vasc: DP/PT pulses faintly palpable, CFT < 5 seconds to digits, TG warm to cool, pitting edema present on bilateral legs  Derm: Surgical site R 2nd and 3rd digit amputation left open with R 2nd & 3rd metatarsal heads visible. No purlent drainage noted. No streaking or erythema noted. No clinical signs of infection noted to R surgical site.  Neuro: Protective sensation grossly diminished  MSK: Able to wiggles toes. R 2nd & 3rd digit amputation     10/15: s/p R foot bedside I&D: tracking noted in all directions, probes to bone, serosanguinous drainage noted       Imaging:    EXAM:  MR FOOT RT                          PROCEDURE DATE:  10/18/2021      INTERPRETATION:  EXAMINATION: MR FOOT RIGHT    CLINICAL INDICATION:Evaluate for osteomyelitis    COMPARISON: Radiographs dated 10/15/2021    TECHNIQUE: Multiplanar, multi-sequence MRI of the right foot was performed without intravenous contrast.    INTERPRETATION:    Bones and soft tissues: There is no fracture. There is patchy STIR hyperintensity within the second digit proximal phalanx with mild patchy T1 hypointensity seen on the short axis TI images. Similar finding is seen at the base of the third digit proximal phalanx. The findings are favored to represent early osteomyelitis, with reactive osteitis considered less likely.    There is extensive surrounding soft tissue edema and phlegmon at the second and third digits which may represent abscess-in -formation with blistering of the overlying skin. Multiple foci of hypointensity seen on series 6 and 7 at the second and third digits may be related to vascular atherosclerosis versus foci of soft tissue gas. Advise radiographic correlation (prior radiographs are dated 10/15/2021).    Muscles: There is extensive edema within the intrinsic muscles of the foot which may be seen in neuropathy.    IMPRESSION:  1.  Findings favored to represent osteomyelitis involving second and third digit proximal phalanges.    2.  Extensive overlying soft tissue infection involving the second and third digits with surrounding edema and phlegmon, may represent abscess-in-formation. Additionally, multiple rounded foci of hypointensity in soft tissue at the second and third digits may be related to vascular atherosclerosis versus foci of soft tissue gas. Advise radiographic correlation (prior radiographs are dated 10/15/2021).      EXAM:  XR FOOT 2 VIEWS RT                          PROCEDURE DATE:  10/15/2021        INTERPRETATION:  Cellulitis swollen right foot.    3 views right foot.    No fracture or dislocation. No focal bone lysis or unusual periosteal reaction. Joint spaces preserved. Small calcaneal osteophytes. Vascular calcification. Soft tissue swelling mostly over the dorsum of the foot may reflect cellulitis. No soft tissue gas.    IMPRESSION: No acute or destructive osseous pathology.    If there is clinical suspicion of osteomyelitis consider bone scan or MRI        EXAM:  US PHYSIOL LWR EXT 3+ LEV BI                          PROCEDURE DATE:  10/16/2021      INTERPRETATION:  Clinical indication: Ischemic toe    COMPARISON: None    FINDINGS: There are pulsatile waveforms bilaterally. Segmental pressures could not be obtained due to calcified, noncompressible vessels. Therefore, ABIs could not be calculated.    IMPRESSION: ABIs could not be calculated due to calcified, noncompressible vessels.      Culture  Specimen Source: .Surgical Swab R foot plantar wound (10.16.21 @ 02:28) Culture Results:   Few Streptococcus agalactiae (Group B) isolated   Group B streptococci are susceptible to ampicillin,   penicillin and cefazolin, but may be resistant to   erythromycin and clindamycin.   Recommendations for intrapartum prophylaxis for Group B   streptococci are penicillin or ampicillin. (10.16.21 @ 02:28)     Surgical Pathology Report (10.20.21 @ 13:45)   Surgical Pathology Report:   ACCESSION No: [id number]   Patient: CLARIBEL REICH   Accession: [id number]   Collected Date/Time: 10/20/2021 13:45 EDT   Received Date/Time: 10/21/2021 08:00 EDT   Surgical Pathology Report - Auth (Verified)   Specimen(s) Submitted   1 Right 2nd metatarsal   2 Right 3rd metatarsal   3 Right foot 2nd and 3rd digit   Final Diagnosis   1. Right second metatarsal bone; biopsy:   Articular cartilage and bone with periarticularsoft tissue   2. Right third metatarsal bone; biopsy:   Scant articular bone and cartilage with periosseous soft tissue   3. Right second and third toes; amputation:   - Gangrenous necrosis with suppuration involving skin and soft tissue   - Bone without osteomyelitis   Verified by: Rhonda Hernández M.D.

## 2021-10-27 NOTE — PHYSICAL THERAPY INITIAL EVALUATION ADULT - TRANSFER SAFETY CONCERNS NOTED: SIT/STAND, REHAB EVAL
decreased balance during turns/decreased safety awareness/losing balance
inability to maintain weight-bearing restrictions w/o assist

## 2021-10-27 NOTE — DISCHARGE NOTE PROVIDER - NSDCMRMEDTOKEN_GEN_ALL_CORE_FT
aspirin 81 mg oral delayed release tablet: 1 tab(s) orally once a day  atorvastatin 80 mg oral tablet: 1 tab(s) orally once a day (at bedtime)  carvedilol 3.125 mg oral tablet: 1 tab(s) orally every 12 hours  Lasix 20 mg oral tablet: 1 tab(s) orally once a day  lisinopril 2.5 mg oral tablet: 1 tab(s) orally once a day  metFORMIN 500 mg oral tablet: 1 tab(s) orally 2 times a day   Xanax 0.5 mg oral tablet: 1 tab(s) orally 2 times a day, As Needed   aspirin 81 mg oral delayed release tablet: 1 tab(s) orally once a day  atorvastatin 80 mg oral tablet: 1 tab(s) orally once a day (at bedtime)  Lasix 20 mg oral tablet: 1 tab(s) orally once a day  metFORMIN 500 mg oral tablet: 1 tab(s) orally 2 times a day   sacubitril-valsartan 24 mg-26 mg oral tablet: 1 tab(s) orally 2 times a day  Xanax 0.5 mg oral tablet: 1 tab(s) orally 2 times a day, As Needed   aspirin 81 mg oral delayed release tablet: 1 tab(s) orally once a day  atorvastatin 80 mg oral tablet: 1 tab(s) orally once a day (at bedtime)  Lasix 20 mg oral tablet: 1 tab(s) orally once a day  metFORMIN 500 mg oral tablet: 1 tab(s) orally 2 times a day   oxycodone-acetaminophen 5 mg-325 mg oral tablet: 1 tab(s) orally every 6 hours, As needed, Severe Pain (7 - 10) MDD:20mg/1300mg  sacubitril-valsartan 24 mg-26 mg oral tablet: 1 tab(s) orally 2 times a day  Xanax 0.5 mg oral tablet: 1 tab(s) orally 2 times a day, As Needed   aspirin 81 mg oral delayed release tablet: 1 tab(s) orally once a day  atorvastatin 80 mg oral tablet: 1 tab(s) orally once a day (at bedtime)  Lasix 20 mg oral tablet: 1 tab(s) orally once a day  metFORMIN 500 mg oral tablet: 1 tab(s) orally 2 times a day   MiraLax oral powder for reconstitution: 17 gram(s) orally once a day   oxycodone-acetaminophen 5 mg-325 mg oral tablet: 1 tab(s) orally every 6 hours, As needed, Severe Pain (7 - 10) MDD:20mg/1300mg  sacubitril-valsartan 24 mg-26 mg oral tablet: 1 tab(s) orally 2 times a day  Xanax 0.5 mg oral tablet: 1 tab(s) orally 2 times a day, As Needed

## 2021-10-27 NOTE — DISCHARGE NOTE PROVIDER - NSDCCPCAREPLAN_GEN_ALL_CORE_FT
PRINCIPAL DISCHARGE DIAGNOSIS  Diagnosis: Gangrene of toe of right foot  Assessment and Plan of Treatment: You presented to the ED with right foot swelling, when you noticed bleeding and found a staple stuck to the bottom of your toe. On the day of admission you noticed chills and nausea.  Patient was admitted in February due to covid, and was found to have CHF and DM. Patient has not been complaint with any of his medications and states he just takes Lasix and Xanax. MRI 10/18 shows proximal OM of 2nd and 3rd digit of R. Foot. Angiogram 10/19 shows Inline flow to R foot via dominant PT and smaller peroneal with AT occlusion. Pt. had amputation of the 2nd and 3rd digit on the R. foot on 10/20. Pt. Tissue Cx shows E.coli ESBL. Given Vanc. and Meropenem. Planned for primary closure of wound on 10/26.   Cont Vancomycin and Meropenem (Cefepime discontinued due to Tissue Cx. E. Coli ESBL 10/24)  Vanc Trough 10/25 - 13.4  Dr Mesa - ID consulted  Cx: Few Streptococcus agalactiae (Group B) isolated sensitive to Ampicillin and Penicillin  MRI:  Findings favored to represent osteomyelitis involving second and third digit proximal phalanges.  Extensive overlying soft tissue infection involving the second and third digits with surrounding edema and phlegmon, may represent abscess-in-formation. Additionally, multiple rounded foci of hypointensity in soft tissue at the second and third digits may be related to vascular atherosclerosis versus foci of soft tissue gas.  MIGDALIA/PVR shows noncompressible vessels - may benefit form antiplatelet therapy once podiatrically stable  Podiatry - Wound closure planned for 2PM today 10/26.        SECONDARY DISCHARGE DIAGNOSES  Diagnosis: Chronic systolic congestive heart failure  Assessment and Plan of Treatment: Not compliant with medications  Primary cardiologist Dr. Fairchild  Echo ordered 10/19 showing EF of 30-35%; GIDD  Held Lasix dose in 10/19 will resume after angiogram  Cardio consult 10/21 recommend to start entresto 26-24 BID  Pt. non-compliant to resume entresto after first dose, c/o fatigue + pain  fluid restrictions; strict I/O; daily weight   encourage medication compliance.      Diagnosis: DM (diabetes mellitus)  Assessment and Plan of Treatment: A1c 7  Not taking his metformin at home   C/w SSI, adjust as needed - pt. noncompliant   Added 2units premeal.    Diagnosis: Anxiety  Assessment and Plan of Treatment: Pt. displays high anxiety   Does not want to c/w xanax currently  c/w low dose Alprazolam 0.25 mg twice daily as needed, began 10/24.       PRINCIPAL DISCHARGE DIAGNOSIS  Diagnosis: Gangrene of toe of right foot  Assessment and Plan of Treatment: You presented to the ED with right foot swelling, when you noticed bleeding and found a staple stuck to the bottom of your toe. On the day of admission you noticed chills and nausea.  You were seen by Dr. Mesa from infectious disease who recommended you be started on vancomycin and cefepime for cellulitis of the R. foot. MIGDALIA/PVR showed noncompressible vessels. MRI was then performed which showed proximal Osteomyelitis of 2nd and 3rd digit of R. Foot. Angiogram performed by Vascular surgery showed Inline flow to R foot via dominant PT and smaller peroneal with AT occlusion. Podiatry performed amputation of the 2nd and 3rd digit on the R. foot on 10/20. Tissue culture grew E.coli ESBL, you were thus resumed on vancomycin and meropenem. You underwent delayed primary wound closure by Podiatry on 10/26. Bone biopsy showed no evidence of further osteomyelitis thus antibiotics were discontinued.  Surgical team recommends nonweight bearing to right foot, however you can partially weight bear on heel as tolerated   You will be discharged on a wound vac. Apply adaptic first, then black foam to the opened wound, secure it with tegaderm. Apply clean 4x4 gauze and wrap it with paula, ACE bandage.   Upon discharge, please  follow up outpatient in clinic [address]. Please call [phone] to make an appointment with Dr. Schultz.      SECONDARY DISCHARGE DIAGNOSES  Diagnosis: Chronic systolic congestive heart failure  Assessment and Plan of Treatment: Not compliant with medications  Primary cardiologist Dr. Fairchild  Echo ordered 10/19 showing EF of 30-35%; GIDD  Held Lasix dose in 10/19 will resume after angiogram  Cardio consult 10/21 recommend to start entresto 26-24 BID  Pt. non-compliant to resume entresto after first dose, c/o fatigue + pain  fluid restrictions; strict I/O; daily weight   encourage medication compliance.      Diagnosis: DM (diabetes mellitus)  Assessment and Plan of Treatment: A1c 7  Not taking his metformin at home   C/w SSI, adjust as needed - pt. noncompliant   Added 2units premeal.  Maintaining blood glucose level within normal range.  - You have a history of diabetes  - Your HbA1c is 7  - You should continue to take your medication regimen regularly as prescribed  - Please follow up with your primary care provider within a week of discharge.  - You need to continue monitoring your blood sugar levels closely.  - Please maintain healthy lifestyle by eating healthy diabetic regimen, weight loss and exercise regularly as tolerated.  Make sure you get your HgA1c checked every three months.  If you take oral diabetes medications, check your blood glucose two times a day.  If you take insulin, check your blood glucose before meals and at bedtime.  It's important not to skip any meals.  Keep a log of your blood glucose results and always take it with you to your doctor appointments.  Keep a list of your current medications including injectables and over the counter medications and bring this medication list with you to all your doctor appointments.  If you have not seen your ophthalmologist this year call for appointment.  Check your feet daily for redness, sores, or openings. Do not self treat. If no improvement in two days call your primary care physician for an appointment.  Low blood sugar (hypoglycemia) is a blood sugar below 70mg/dl. Check your blood sugar if you feel signs/symptoms of hypoglycemia. If your blood sugar is below 70 take 15 grams of carbohydrates (ex 4 oz of apple juice, 3-4 glucose tablets, or 4-6 oz of regular soda) wait 15 minutes and repeat blood sugar to make sure it comes up above 70.  If your blood sugar is above 70 and you are due for a meal, have a meal.  If you are not due for a meal have a snack.  This snack helps keeps your blood sugar at a safe range.      Diagnosis: Anxiety  Assessment and Plan of Treatment: Pt. displays high anxiety   Does not want to c/w xanax currently  c/w low dose Alprazolam 0.25 mg twice daily as needed, began 10/24.       PRINCIPAL DISCHARGE DIAGNOSIS  Diagnosis: Gangrene of toe of right foot  Assessment and Plan of Treatment: You presented to the ED with right foot swelling, when you noticed bleeding and found a staple stuck to the bottom of your toe. On the day of admission you noticed chills and nausea.  You were seen by Dr. Mesa from infectious disease who recommended you be started on vancomycin and cefepime for cellulitis of the R. foot. MIGDALIA/PVR showed noncompressible vessels. MRI was then performed which showed proximal Osteomyelitis of 2nd and 3rd digit of R. Foot. Angiogram performed by Vascular surgery showed Inline flow to R foot via dominant PT and smaller peroneal with AT occlusion. Podiatry performed amputation of the 2nd and 3rd digit on the R. foot on 10/20. Tissue culture grew E.coli ESBL, you were thus resumed on Vancomycin and Meropenem. You underwent delayed primary wound closure by Podiatry on 10/26. Bone biopsy showed no evidence of further osteomyelitis thus antibiotics were discontinued.  Podiatry team recommends nonweight bearing to right foot, however you can partially weight bear on heel as tolerated.  You will be discharged on a wound vac.   WOUND CARE INSTRUCTIONS: Apply adaptic first, then black foam to the opened wound, secure it with tegaderm. Apply clean 4x4 gauze and wrap it with paula, ACE bandage.   Upon discharge, please follow up outpatient in clinic [address]. Please call [phone] to make an appointment with Dr. Schultz.      SECONDARY DISCHARGE DIAGNOSES  Diagnosis: Chronic systolic congestive heart failure  Assessment and Plan of Treatment: You have history of Chronic systolic Heart Failure-Congestive heart failure is a condition in which the heart has trouble pumping blood. In some cases of heart failure, fluid may back up into your lungs or you may have swelling (edema) in your lower legs. There are many causes of heart failure including high blood pressure. Symptoms include shortness of breath with activity or when lying flat, cough, swelling of the legs, fatigue, or increased urination during the night. Treatment is aimed at managing the symptoms of heart failure and may include lifestyle changes, medications, or surgical procedures. Your recently Echocardiogram (ultrasound of the heart) showed low heart pumping function (EF 30-35%, Grade I Diastolic dysfunction). You are strongly encouraged to take ENTRESTO 24-26 twice daily as per Cardiology recommendations (Dr. Fairchild). Eat heart-healthy foods with low or no trans/saturated fats, cholesterol and salt. Weigh yourself every day for early recognition of fluid accumulation. PLEASE SEEK IMMEDIATE MEDICAL CARE IF YOU HAVE ANY OF THE FOLLOWING SYMPTOMS: shortness of breath, change in mental status, chest pain, lightheadedness/dizziness/fainting, or worsening of symptoms including not being able to conduct normal physical activity.      Diagnosis: DM (diabetes mellitus)  Assessment and Plan of Treatment: - You have a history of diabetes  - Your HbA1c is 7 which reflects your blood sugar control over the last 3 months  - You should continue to take your medication regimen (Metformin) regularly as prescribed  - Please follow up with your primary care provider within a week of discharge.  - You need to continue monitoring your blood sugar levels closely.  - Please maintain healthy lifestyle by eating healthy diabetic regimen, weight loss and exercise regularly as tolerated.  Make sure you get your HgA1c checked every three months.  If you take oral diabetes medications, check your blood glucose two times a day.  It's important not to skip any meals.  Keep a log of your blood glucose results and always take it with you to your doctor appointments.  Keep a list of your current medications including injectables and over the counter medications and bring this medication list with you to all your doctor appointments.  If you have not seen your ophthalmologist this year call for appointment.  Check your feet daily for redness, sores, or openings. Do not self treat. If no improvement in two days call your primary care physician for an appointment.  Low blood sugar (hypoglycemia) is a blood sugar below 70mg/dl. Check your blood sugar if you feel signs/symptoms of hypoglycemia. If your blood sugar is below 70 take 15 grams of carbohydrates (ex 4 oz of apple juice, 3-4 glucose tablets, or 4-6 oz of regular soda) wait 15 minutes and repeat blood sugar to make sure it comes up above 70.  If your blood sugar is above 70 and you are due for a meal, have a meal.  If you are not due for a meal have a snack.  This snack helps keeps your blood sugar at a safe range.      Diagnosis: Anxiety  Assessment and Plan of Treatment: Please continue with Xanax 0.25 twice daily as needed for Anxiety.       PRINCIPAL DISCHARGE DIAGNOSIS  Diagnosis: Osteomyelitis of foot  Assessment and Plan of Treatment: You presented to the ED with right foot swelling, when you noticed bleeding and found a staple stuck to the bottom of your toe. On the day of admission you noticed chills and nausea.  You were seen by Dr. Mesa from infectious disease who recommended you be started on vancomycin and cefepime for cellulitis of the R. foot. MIGDALIA/PVR showed noncompressible vessels. MRI was then performed which showed proximal Osteomyelitis of 2nd and 3rd digit of R. Foot. Angiogram performed by Vascular surgery showed Inline flow to R foot via dominant PT and smaller peroneal with AT occlusion. Podiatry performed amputation of the 2nd and 3rd digit on the R. foot on 10/20. Tissue culture grew E.coli ESBL, you were thus resumed on Vancomycin and Meropenem. You underwent delayed primary wound closure by Podiatry on 10/26. Bone biopsy showed no evidence of further osteomyelitis thus antibiotics were discontinued.  Podiatry team recommends nonweight bearing to right foot, however you can partially weight bear on heel as tolerated.  You will be discharged on a wound vac.   WOUND CARE INSTRUCTIONS: Apply adaptic first, then black foam to the opened wound, secure it with tegaderm. Apply clean 4x4 gauze and wrap it with paula, ACE bandage.   Upon discharge, please follow up outpatient in clinic [address]. Please call [phone] to make an appointment with Dr. Schultz.      SECONDARY DISCHARGE DIAGNOSES  Diagnosis: Chronic systolic congestive heart failure  Assessment and Plan of Treatment: You have history of Chronic systolic Heart Failure-Congestive heart failure is a condition in which the heart has trouble pumping blood. In some cases of heart failure, fluid may back up into your lungs or you may have swelling (edema) in your lower legs. There are many causes of heart failure including high blood pressure. Symptoms include shortness of breath with activity or when lying flat, cough, swelling of the legs, fatigue, or increased urination during the night. Treatment is aimed at managing the symptoms of heart failure and may include lifestyle changes, medications, or surgical procedures. Your recently Echocardiogram (ultrasound of the heart) showed low heart pumping function (EF 30-35%, Grade I Diastolic dysfunction). You are strongly encouraged to take ENTRESTO 24-26 twice daily as per Cardiology recommendations (Dr. Fairchild). Eat heart-healthy foods with low or no trans/saturated fats, cholesterol and salt. Weigh yourself every day for early recognition of fluid accumulation. PLEASE SEEK IMMEDIATE MEDICAL CARE IF YOU HAVE ANY OF THE FOLLOWING SYMPTOMS: shortness of breath, change in mental status, chest pain, lightheadedness/dizziness/fainting, or worsening of symptoms including not being able to conduct normal physical activity.      Diagnosis: DM (diabetes mellitus)  Assessment and Plan of Treatment: - You have a history of diabetes  - Your HbA1c is 7 which reflects your blood sugar control over the last 3 months  - You should continue to take your medication regimen (Metformin) regularly as prescribed  - Please follow up with your primary care provider within a week of discharge.  - You need to continue monitoring your blood sugar levels closely.  - Please maintain healthy lifestyle by eating healthy diabetic regimen, weight loss and exercise regularly as tolerated.  Make sure you get your HgA1c checked every three months.  If you take oral diabetes medications, check your blood glucose two times a day.  It's important not to skip any meals.  Keep a log of your blood glucose results and always take it with you to your doctor appointments.  Keep a list of your current medications including injectables and over the counter medications and bring this medication list with you to all your doctor appointments.  If you have not seen your ophthalmologist this year call for appointment.  Check your feet daily for redness, sores, or openings. Do not self treat. If no improvement in two days call your primary care physician for an appointment.  Low blood sugar (hypoglycemia) is a blood sugar below 70mg/dl. Check your blood sugar if you feel signs/symptoms of hypoglycemia. If your blood sugar is below 70 take 15 grams of carbohydrates (ex 4 oz of apple juice, 3-4 glucose tablets, or 4-6 oz of regular soda) wait 15 minutes and repeat blood sugar to make sure it comes up above 70.  If your blood sugar is above 70 and you are due for a meal, have a meal.  If you are not due for a meal have a snack.  This snack helps keeps your blood sugar at a safe range.      Diagnosis: Anxiety  Assessment and Plan of Treatment: Please continue with Xanax 0.25 twice daily as needed for Anxiety.       PRINCIPAL DISCHARGE DIAGNOSIS  Diagnosis: Osteomyelitis of foot  Assessment and Plan of Treatment: You presented to the ED with right foot swelling, when you noticed bleeding and found a staple stuck to the bottom of your toe. On the day of admission you noticed chills and nausea.  You were seen by Dr. Mesa from infectious disease who recommended you be started on vancomycin and cefepime for cellulitis of the R. foot. MIGDALIA/PVR showed noncompressible vessels. MRI was then performed which showed proximal Osteomyelitis of 2nd and 3rd digit of R. Foot. Angiogram performed by Vascular surgery showed Inline flow to R foot via dominant PT and smaller peroneal with AT occlusion. Podiatry performed amputation of the 2nd and 3rd digit on the R. foot on 10/20. Tissue culture grew E.coli ESBL, you were thus resumed on Vancomycin and Meropenem. You underwent delayed primary wound closure by Podiatry on 10/26. Bone biopsy showed no evidence of further osteomyelitis thus antibiotics were discontinued.  Podiatry team recommends nonweight bearing to right foot, however you can partially weight bear on heel as tolerated.  You will be discharged on a wound vac.   WOUND CARE INSTRUCTIONS: Apply adaptic first, then black foam to the opened wound, secure it with tegaderm. Apply clean 4x4 gauze and wrap it with paula, ACE bandage.   Upon discharge, please follow up outpatient in clinic [address]. Please call [phone] to make an appointment with Dr. Schultz.      SECONDARY DISCHARGE DIAGNOSES  Diagnosis: Chronic systolic congestive heart failure  Assessment and Plan of Treatment: You have history of Chronic systolic Heart Failure-Congestive heart failure is a condition in which the heart has trouble pumping blood. In some cases of heart failure, fluid may back up into your lungs or you may have swelling (edema) in your lower legs. There are many causes of heart failure including high blood pressure. Symptoms include shortness of breath with activity or when lying flat, cough, swelling of the legs, fatigue, or increased urination during the night. Treatment is aimed at managing the symptoms of heart failure and may include lifestyle changes, medications, or surgical procedures. Your recently Echocardiogram (ultrasound of the heart) showed low heart pumping function (EF 30-35%, Grade I Diastolic dysfunction). You are strongly encouraged to take ENTRESTO 24-26 twice daily as per Cardiology recommendations (Dr. Fairchild). Eat heart-healthy foods with low or no trans/saturated fats, cholesterol and salt. Weigh yourself every day for early recognition of fluid accumulation. PLEASE SEEK IMMEDIATE MEDICAL CARE IF YOU HAVE ANY OF THE FOLLOWING SYMPTOMS: shortness of breath, change in mental status, chest pain, lightheadedness/dizziness/fainting, or worsening of symptoms including not being able to conduct normal physical activity.      Diagnosis: DM (diabetes mellitus)  Assessment and Plan of Treatment: - You have a history of diabetes  - Your HbA1c is 7 which reflects your blood sugar control over the last 3 months  - You should continue to take your medication regimen (Metformin) regularly as prescribed  - Please follow up with your primary care provider within a week of discharge.  - You need to continue monitoring your blood sugar levels closely.  - Please maintain healthy lifestyle by eating healthy diabetic regimen, weight loss and exercise regularly as tolerated.  Make sure you get your HgA1c checked every three months.  If you take oral diabetes medications, check your blood glucose two times a day.  It's important not to skip any meals.  Keep a log of your blood glucose results and always take it with you to your doctor appointments.  Keep a list of your current medications including injectables and over the counter medications and bring this medication list with you to all your doctor appointments.  If you have not seen your ophthalmologist this year call for appointment.  Check your feet daily for redness, sores, or openings. Do not self treat. If no improvement in two days call your primary care physician for an appointment.  Low blood sugar (hypoglycemia) is a blood sugar below 70mg/dl. Check your blood sugar if you feel signs/symptoms of hypoglycemia. If your blood sugar is below 70 take 15 grams of carbohydrates (ex 4 oz of apple juice, 3-4 glucose tablets, or 4-6 oz of regular soda) wait 15 minutes and repeat blood sugar to make sure it comes up above 70.  If your blood sugar is above 70 and you are due for a meal, have a meal.  If you are not due for a meal have a snack.  This snack helps keeps your blood sugar at a safe range.      Diagnosis: Anxiety  Assessment and Plan of Treatment: Please continue with Xanax 0.25 twice daily as needed for Anxiety.  You may follow up with a Psychiatrist outpatient with any of the following clinics:  a) [facility] walk in clinic [phone]  b) [facility] walk in [place] [phone]  c) [facility] ([facility] with [facility])  [place] [phone], [place] [phone]  d) [facility] walk in [facility] [phone]  e) [address] [place] [address] 24-hour telephone support  f) [facility] COVID-19 emotional support line [phone]

## 2021-10-27 NOTE — PROGRESS NOTE ADULT - ASSESSMENT
Right foot and leg cellulitis  Osteomyelitis of right foot - s/p complete amputation of 2nd and 3rd toes    Plan:  ·	Continue Vancomycin 1000mgs iv q12hrs   ·	Continue meropenem 1gm IV q8h for now  ·	awaiting pathology results            Right foot and leg cellulitis  Osteomyelitis of right foot - s/p complete amputation of 2nd and 3rd toes    Plan:  ·	Continue Vancomycin 1500mgs iv q12hrs   ·	Continue meropenem 1gm IV q8h while inhouse    Surgical Pathology is negative for osteomyelitis, patient can be discharged without any antibiotics            Right foot and leg cellulitis  Osteomyelitis of right foot - s/p complete amputation of 2nd and 3rd toes    Plan:  ·	Continue Vancomycin 1500mgs iv q12hrs   ·	Continue meropenem 1gm IV q8h while inhouse    Surgical Pathology is negative for osteomyelitis, patient can be discharged without any antibiotics     I agree with above

## 2021-10-28 LAB
ANION GAP SERPL CALC-SCNC: 6 MMOL/L — SIGNIFICANT CHANGE UP (ref 5–17)
BUN SERPL-MCNC: 20 MG/DL — HIGH (ref 7–18)
CALCIUM SERPL-MCNC: 9.2 MG/DL — SIGNIFICANT CHANGE UP (ref 8.4–10.5)
CHLORIDE SERPL-SCNC: 103 MMOL/L — SIGNIFICANT CHANGE UP (ref 96–108)
CO2 SERPL-SCNC: 29 MMOL/L — SIGNIFICANT CHANGE UP (ref 22–31)
CREAT SERPL-MCNC: 1.17 MG/DL — SIGNIFICANT CHANGE UP (ref 0.5–1.3)
GLUCOSE BLDC GLUCOMTR-MCNC: 145 MG/DL — HIGH (ref 70–99)
GLUCOSE BLDC GLUCOMTR-MCNC: 160 MG/DL — HIGH (ref 70–99)
GLUCOSE BLDC GLUCOMTR-MCNC: 165 MG/DL — HIGH (ref 70–99)
GLUCOSE BLDC GLUCOMTR-MCNC: 178 MG/DL — HIGH (ref 70–99)
GLUCOSE SERPL-MCNC: 156 MG/DL — HIGH (ref 70–99)
HCT VFR BLD CALC: 30.4 % — LOW (ref 39–50)
HGB BLD-MCNC: 10 G/DL — LOW (ref 13–17)
MCHC RBC-ENTMCNC: 29.7 PG — SIGNIFICANT CHANGE UP (ref 27–34)
MCHC RBC-ENTMCNC: 32.9 GM/DL — SIGNIFICANT CHANGE UP (ref 32–36)
MCV RBC AUTO: 90.2 FL — SIGNIFICANT CHANGE UP (ref 80–100)
NRBC # BLD: 0 /100 WBCS — SIGNIFICANT CHANGE UP (ref 0–0)
PLATELET # BLD AUTO: 269 K/UL — SIGNIFICANT CHANGE UP (ref 150–400)
POTASSIUM SERPL-MCNC: 4.1 MMOL/L — SIGNIFICANT CHANGE UP (ref 3.5–5.3)
POTASSIUM SERPL-SCNC: 4.1 MMOL/L — SIGNIFICANT CHANGE UP (ref 3.5–5.3)
RBC # BLD: 3.37 M/UL — LOW (ref 4.2–5.8)
RBC # FLD: 12.4 % — SIGNIFICANT CHANGE UP (ref 10.3–14.5)
SODIUM SERPL-SCNC: 138 MMOL/L — SIGNIFICANT CHANGE UP (ref 135–145)
WBC # BLD: 7.12 K/UL — SIGNIFICANT CHANGE UP (ref 3.8–10.5)
WBC # FLD AUTO: 7.12 K/UL — SIGNIFICANT CHANGE UP (ref 3.8–10.5)

## 2021-10-28 PROCEDURE — 99232 SBSQ HOSP IP/OBS MODERATE 35: CPT | Mod: GC

## 2021-10-28 RX ORDER — FUROSEMIDE 40 MG
1 TABLET ORAL
Qty: 30 | Refills: 0
Start: 2021-10-28 | End: 2021-11-26

## 2021-10-28 RX ADMIN — OXYCODONE AND ACETAMINOPHEN 1 TABLET(S): 5; 325 TABLET ORAL at 11:45

## 2021-10-28 RX ADMIN — OXYCODONE AND ACETAMINOPHEN 1 TABLET(S): 5; 325 TABLET ORAL at 06:02

## 2021-10-28 RX ADMIN — OXYCODONE AND ACETAMINOPHEN 1 TABLET(S): 5; 325 TABLET ORAL at 12:30

## 2021-10-28 RX ADMIN — Medication 0.5 MILLIGRAM(S): at 15:21

## 2021-10-28 RX ADMIN — OXYCODONE AND ACETAMINOPHEN 1 TABLET(S): 5; 325 TABLET ORAL at 05:32

## 2021-10-28 NOTE — PROGRESS NOTE ADULT - PROBLEM SELECTOR PLAN 1
Cont Vancomycin and Meropenem (Cefepime discontinued due to Tissue Cx. E. Coli ESBL 10/24)  Vanc Trough 10/27 - 17.0  Dr Mesa - ID consulted  Cx: Few Streptococcus agalactiae (Group B) isolated sensitive to Ampicillin and Penicillin  MRI:  Findings favored to represent osteomyelitis involving second and third digit proximal phalanges.  Extensive overlying soft tissue infection involving the second and third digits with surrounding edema and phlegmon, may represent abscess-in-formation. Additionally, multiple rounded foci of hypointensity in soft tissue at the second and third digits may be related to vascular atherosclerosis versus foci of soft tissue gas.  MIGDALIA/PVR shows noncompressible vessels - may benefit form antiplatelet therapy once podiatrically stable  Vascular consulted - Angiogram shows Inline flow to R foot via dominant PT and smaller peroneal with AT occlusion  Podiatry consulted - pt. had 2nd and 3rd digit of R. Foot amputated on 10/20  c/w Abx as per ID  Pain Management: oxycodone 5 mG/acetaminophen 325 mG PRN q6h  Podiatry completed primary wound closure 10/26  Surgical Pathology is negative for osteomyelitis, patient can be discharged without any antibiotics  f/u Podiatry + ID re XR findings post delayed primary wound closure Cont Vancomycin and Meropenem (Cefepime discontinued due to Tissue Cx. E. Coli ESBL 10/24)  Vanc Trough 10/27 - 17.0  Dr Mesa - ID consulted  Cx: Few Streptococcus agalactiae (Group B) isolated sensitive to Ampicillin and Penicillin  MRI:  Findings favored to represent osteomyelitis involving second and third digit proximal phalanges.  Extensive overlying soft tissue infection involving the second and third digits with surrounding edema and phlegmon, may represent abscess-in-formation. Additionally, multiple rounded foci of hypointensity in soft tissue at the second and third digits may be related to vascular atherosclerosis versus foci of soft tissue gas.  MIGDALIA/PVR shows noncompressible vessels - may benefit form antiplatelet therapy once podiatrically stable  Vascular consulted - Angiogram shows Inline flow to R foot via dominant PT and smaller peroneal with AT occlusion  Podiatry consulted - pt. had 2nd and 3rd digit of R. Foot amputated on 10/20  c/w Abx as per ID  Pain Management: oxycodone 5 mG/acetaminophen 325 mG PRN q6h  Podiatry completed primary wound closure 10/26  Surgical Pathology is negative for osteomyelitis, patient can be discharged without any antibiotics

## 2021-10-28 NOTE — PROGRESS NOTE ADULT - SUBJECTIVE AND OBJECTIVE BOX
51y Male is under our care for     REVIEW OF SYSTEMS:  [  ] Not able to elicit  General:	  Chest:	  GI:	  :  Skin:	  Musculoskeletal:	  Neuro:	    MEDS:    ALLERGIES: Allergies    Nuts (Unknown)  tetracycline (Hives)    Intolerances        VITALS:  Vital Signs Last 24 Hrs  T(C): 36.9 (28 Oct 2021 05:25), Max: 36.9 (28 Oct 2021 05:25)  T(F): 98.4 (28 Oct 2021 05:25), Max: 98.4 (28 Oct 2021 05:25)  HR: 94 (28 Oct 2021 05:25) (94 - 99)  BP: 113/77 (28 Oct 2021 05:25) (112/82 - 113/79)  BP(mean): 89 (27 Oct 2021 13:25) (89 - 89)  RR: 16 (28 Oct 2021 05:25) (16 - 17)  SpO2: 100% (28 Oct 2021 05:25) (100% - 100%)      PHYSICAL EXAM:  HEENT:  Neck:  Respiratory:  Cardiovascular:  Gastrointestinal:  Extremities:  Skin:  Ortho:  Neuro:    LABS/DIAGNOSTIC TESTS:                        10.0   7.12  )-----------( 269      ( 28 Oct 2021 07:04 )             30.4     WBC Count: 7.12 K/uL (10-28 @ 07:04)  WBC Count: 10.04 K/uL (10-27 @ 04:41)  WBC Count: 6.89 K/uL (10-26 @ 08:54)  WBC Count: 9.09 K/uL (10-24 @ 12:37)    10-28    138  |  103  |  20<H>  ----------------------------<  156<H>  4.1   |  29  |  1.17    Ca    9.2      28 Oct 2021 07:04        CULTURES:   .Tissue right 3rd metatarsal  10-20 @ 21:21   Rare Escherichia coli ESBL  Rare Cutibacterium acnes "Susceptibilities not performed"  --  Escherichia coli ESBL      .Tissue right 2nd metatarsal  10-20 @ 21:20   Few Escherichia coli ESBL  --  Escherichia coli ESBL      .Surgical Swab R foot plantar wound  10-16 @ 02:28   Numerous Finegoldia magna "Susceptibilities not performed"  Few Staphylococcus aureus  Few Streptococcus agalactiae (Group B) isolated  Group B streptococci are susceptible to ampicillin,  penicillin and cefazolin, but may be resistant to  erythromycin and clindamycin.  Recommendations for intrapartum prophylaxis for Group B  streptococci are penicillin or ampicillin.  --  Staphylococcus aureus        RADIOLOGY:  no new studies 51y Male is under our care for right foot cellulitis.  Patient was seen laying comfortably in bed with no acute distress.  He has a wound vac placed on the right foot and is currently awaiting delivery of home wound vac to be discharged.  He remains afebrile and WBC count is WNL.     REVIEW OF SYSTEMS:  [  ] Not able to elicit  General: no fevers no malaise  Chest: no cough no sob  GI: no nvd  : no urinary sxs   Skin: no rashes  Musculoskeletal: no trauma no LBP  Neuro: no ha's no dizziness     MEDS:    ALLERGIES: Allergies    Nuts (Unknown)  tetracycline (Hives)    Intolerances        VITALS:  Vital Signs Last 24 Hrs  T(C): 36.9 (28 Oct 2021 05:25), Max: 36.9 (28 Oct 2021 05:25)  T(F): 98.4 (28 Oct 2021 05:25), Max: 98.4 (28 Oct 2021 05:25)  HR: 94 (28 Oct 2021 05:25) (94 - 99)  BP: 113/77 (28 Oct 2021 05:25) (112/82 - 113/79)  BP(mean): 89 (27 Oct 2021 13:25) (89 - 89)  RR: 16 (28 Oct 2021 05:25) (16 - 17)  SpO2: 100% (28 Oct 2021 05:25) (100% - 100%)      PHYSICAL EXAM:  HEENT: n/a  Neck: supple no LN's   Respiratory: lungs clear no rales  Cardiovascular: S1 S2 reg no murmurs  Gastrointestinal: +BS with soft, nondistended abdomen; nontender  Extremities: no edema, right foot dressing dry and intact  Skin: no rashes  Ortho: n/a  Neuro: AAO x 4    LABS/DIAGNOSTIC TESTS:                        10.0   7.12  )-----------( 269      ( 28 Oct 2021 07:04 )             30.4     WBC Count: 7.12 K/uL (10-28 @ 07:04)  WBC Count: 10.04 K/uL (10-27 @ 04:41)  WBC Count: 6.89 K/uL (10-26 @ 08:54)  WBC Count: 9.09 K/uL (10-24 @ 12:37)    10-28    138  |  103  |  20<H>  ----------------------------<  156<H>  4.1   |  29  |  1.17    Ca    9.2      28 Oct 2021 07:04        CULTURES:   .Tissue right 3rd metatarsal  10-20 @ 21:21   Rare Escherichia coli ESBL  Rare Cutibacterium acnes "Susceptibilities not performed"  --  Escherichia coli ESBL      .Tissue right 2nd metatarsal  10-20 @ 21:20   Few Escherichia coli ESBL  --  Escherichia coli ESBL      .Surgical Swab R foot plantar wound  10-16 @ 02:28   Numerous Finegoldia magna "Susceptibilities not performed"  Few Staphylococcus aureus  Few Streptococcus agalactiae (Group B) isolated  Group B streptococci are susceptible to ampicillin,  penicillin and cefazolin, but may be resistant to  erythromycin and clindamycin.  Recommendations for intrapartum prophylaxis for Group B  streptococci are penicillin or ampicillin.  --  Staphylococcus aureus        RADIOLOGY:  no new studies

## 2021-10-28 NOTE — PROGRESS NOTE ADULT - ATTENDING COMMENTS
Patient was seen and examined at bedside   Pain improrved     Vitals noted    P/E:  As above     Labs noted    A/P:  Delayed Wound closure done by podiatry, wound vac and wound care instructions on discharge  Clear margin on surgical pathology result   Can stop antibiotics   Pending discharge after arrangement of wound vac  Again counselled patient about outpatient follow up and medication compliance

## 2021-10-28 NOTE — PROGRESS NOTE ADULT - ASSESSMENT
Right foot and leg cellulitis  Osteomyelitis of right foot - s/p complete amputation of 2nd and 3rd toes    Plan: Surgical Pathology is negative for osteomyelitis, patient can be discharged without any antibiotics                  Right foot and leg cellulitis - resolved  Osteomyelitis of right foot - s/p complete amputation of 2nd and 3rd toes    Plan: Surgical Pathology is negative for osteomyelitis, patient can be discharged without any antibiotics   reconsult prn.    I agree with above

## 2021-10-28 NOTE — PROGRESS NOTE ADULT - SUBJECTIVE AND OBJECTIVE BOX
PGY-1 Progress Note discussed with attending    PAGER #: [[phone]] TILL 5:00 PM  PLEASE CONTACT ON CALL TEAM:  - On Call Team (Please refer to Quintin) FROM 5:00 PM - 8:30PM  - Nightfloat Team FROM 8:30 -7:30 AM    CHIEF COMPLAINT & BRIEF HOSPITAL COURSE:  51 year old male with PMH CVA (10 years ago, no deficits), HTN, DM, CHF (EF 30-35% 2/21), presented with right foot swelling. Patient stated two weeks ago he noticed bleeding in his shoes and found a staple stuck to the bottom of his feet. He didn't notice it before because he states he doesn't have any sensation in his feet. He went to urgent care, where they prescribed him Keflex Q8hrs. Patient states the foot started to swell more so he returned to urgent care yesterday where they gave him a prescription of clindamycin. Patient this morning started to endorse chills, nausea, but denies any fever, vomiting, chest pain, SOB, abdominal pain, or changes in bowel habits.  Patient was admitted in February due to covid, and was found to have CHF and DM. Patient has not been complaint with any of his medications and states he just takes Lasix and Xanax. MRI 10/18 shows proximal OM of 2nd and 3rd digit of R. Foot. Angiogram 10/19 shows Inline flow to R foot via dominant PT and smaller peroneal with AT occlusion. Pt. had amputation of the 2nd and 3rd digit on the R. foot on 10/20. Pt. Tissue Cx shows E.coli ESBL. Given Vanc. and Meropenem. Planned had delayed primary closure of wound on 10/26. Surgical pathology shows no OM. Pt. planned for discharge.    INTERVAL HPI/OVERNIGHT EVENTS:   Pt. s/p 2nd and 3rd digit amputation of R. Foot POD#8. Pt. had primary closure of wound 2 days ago. Pt continues to be non-compliant with CHF medicines due to side effects. Describes moderate, throbbing R. foot pain rated 5/10. Pt. notes sleeping better last night and lower anxiety levels as he is optimistic for discharge.     MEDICATIONS  (STANDING):  aspirin enteric coated 81 milliGRAM(s) Oral daily  atorvastatin 80 milliGRAM(s) Oral at bedtime  chlorhexidine 2% Cloths 1 Application(s) Topical <User Schedule>  enoxaparin Injectable 40 milliGRAM(s) SubCutaneous daily  influenza   Vaccine 0.5 milliLiter(s) IntraMuscular once  insulin lispro (ADMELOG) corrective regimen sliding scale   SubCutaneous Before meals and at bedtime  insulin lispro Injectable (ADMELOG) 2 Unit(s) SubCutaneous three times a day before meals  lactated ringers. 1000 milliLiter(s) (75 mL/Hr) IV Continuous <Continuous>  melatonin 3 milliGRAM(s) Oral at bedtime  sacubitril 24 mG/valsartan 26 mG 1 Tablet(s) Oral two times a day  senna 2 Tablet(s) Oral at bedtime    MEDICATIONS  (PRN):  acetaminophen     Tablet .. 650 milliGRAM(s) Oral every 6 hours PRN Mild Pain (1 - 3)  ALPRAZolam 0.5 milliGRAM(s) Oral two times a day PRN Anxiety  morphine  - Injectable 2 milliGRAM(s) IV Push every 6 hours PRN Severe Pain (7 - 10)  ondansetron Injectable 4 milliGRAM(s) IV Push every 6 hours PRN Nausea and/or Vomiting  oxycodone    5 mG/acetaminophen 325 mG 1 Tablet(s) Oral every 4 hours PRN Moderate Pain (4 - 6)      REVIEW OF SYSTEMS:  CONSTITUTIONAL: No fever, chills, weight loss, + fatigue  RESPIRATORY: No dry cough, Denies wheezing, chills or hemoptysis; No shortness of breath  CARDIOVASCULAR: No chest pain, palpitations, dizziness, or leg swelling  GASTROINTESTINAL: No abdominal pain. No vomiting, or hematemesis; No diarrhea or constipation. No melena or hematochezia.  GENITOURINARY: No dysuria or hematuria, urinary frequency  NEUROLOGICAL: No headaches, memory loss, loss of strength  MSK: Right foot pain, dull, moderate 5/10  SKIN: Slightly bruising and discoloration on both anterior legs    Vital Signs Last 24 Hrs  T(C): 36.9 (28 Oct 2021 05:25), Max: 36.9 (28 Oct 2021 05:25)  T(F): 98.4 (28 Oct 2021 05:25), Max: 98.4 (28 Oct 2021 05:25)  HR: 94 (28 Oct 2021 05:25) (94 - 99)  BP: 113/77 (28 Oct 2021 05:25) (112/82 - 113/79)  BP(mean): 89 (27 Oct 2021 13:25) (89 - 89)  RR: 16 (28 Oct 2021 05:25) (16 - 17)  SpO2: 100% (28 Oct 2021 05:25) (100% - 100%)    PHYSICAL EXAMINATION:  GENERAL: NAD, well built  HEAD:  Atraumatic, Normocephalic  EYES:  conjunctiva and sclera clear  NECK: Supple, No JVD, Normal thyroid  CHEST/LUNG: Lungs CTA. No rales, rhonchi, wheezing, or rubs  HEART: Regular rate and rhythm; No murmurs, rubs, or gallops  ABDOMEN: Soft, Nontender, Nondistended; Bowel sounds present  NERVOUS SYSTEM:  Alert & Oriented X3  EXTREMITIES:  1+ pitting edema b/l 2+ Peripheral Pulses, No clubbing, cyanosis  SKIN: s/p R. Foot 2nd and 3rd digit amputation, wound dressed.                                                 10.0   7.12  )-----------( 269      ( 28 Oct 2021 07:04 )             30.4       138  |  103  |  20<H>  ----------------------------<  156<H>  4.1   |  29  |  1.17    Ca    9.2      28 Oct 2021 07:04      Vancomycin Level, Trough: 17.0 (10.27.21 @ 04:40)       CAPILLARY BLOOD GLUCOSE      POCT Blood Glucose.: 178 mg/dL (28 Oct 2021 11:25)  POCT Blood Glucose.: 160 mg/dL (28 Oct 2021 07:35)  POCT Blood Glucose.: 169 mg/dL (27 Oct 2021 21:09)  POCT Blood Glucose.: 151 mg/dL (27 Oct 2021 16:30)      RADIOLOGY & ADDITIONAL TESTS:    EXAM:  US PHYSIOL LWR EXT 3+ LEV BI                        PROCEDURE DATE:  10/16/2021    INTERPRETATION:  Clinical indication: Ischemic toe  COMPARISON: None  FINDINGS: There are pulsatile waveforms bilaterally. Segmental pressures could not be obtained due to calcified, noncompressible vessels. Therefore, ABIs could not be calculated.  IMPRESSION: ABIs could not be calculated due to calcified, noncompressible vessels.    ------------------------------------------------------------------------    EXAM:  XR FOOT 2 VIEWS RT                        PROCEDURE DATE:  10/15/2021    INTERPRETATION:  Cellulitis swollen right foot.  3 views right foot.  No fracture or dislocation. No focal bone lysis or unusual periosteal reaction. Joint spaces preserved. Small calcaneal osteophytes. Vascular calcification. Soft tissue swelling mostly over the dorsum of the foot may reflect cellulitis. No soft tissue gas.  IMPRESSION: No acute or destructive osseous pathology.  If there is clinical suspicion of osteomyelitis consider bone scan or MRI    ------------------------------------------------------------------------    EXAM:  MR FOOT RT                        PROCEDURE DATE:  10/18/2021    INTERPRETATION:  EXAMINATION: MR FOOT RIGHT  CLINICAL INDICATION: Evaluate for osteomyelitis  COMPARISON: Radiographs dated 10/15/2021  TECHNIQUE: Multiplanar, multi-sequence MRI of the right foot was performed without intravenous contrast.  INTERPRETATION:  Bones and soft tissues: There is no fracture. There is patchy STIR hyperintensity within the second digit proximal phalanx with mild patchy T1 hypointensity seen on the short axis TI images. Similar finding is seen at the base of the third digit proximal phalanx. The findings are favored to represent early osteomyelitis, with reactive osteitis considered less likely.  There is extensive surrounding soft tissue edema and phlegmon at the second and third digits which may represent abscess-in -formation with blistering of the overlying skin. Multiple foci of hypointensity seen on series 6 and 7 at the second and third digits may be related to vascular atherosclerosis versus foci of soft tissue gas. Advise radiographic correlation (prior radiographs are dated 10/15/2021).  Muscles: There is extensive edema within the intrinsic muscles of the foot which may be seen in neuropathy.  IMPRESSION:  1.  Findings favored to represent osteomyelitis involving second and third digit proximal phalanges.  2.  Extensive overlying soft tissue infection involving the second and third digits with surrounding edema and phlegmon, may represent abscess-in-formation. Additionally, multiple rounded foci of hypointensity in soft tissue at the second and third digits may be related to vascular atherosclerosis versus foci of soft tissue gas. Advise radiographic correlation (prior radiographs are dated 10/15/2021).    ------------------------------------------------------------------------    PROCEDURE: Transthoracic echocardiogram with 2-D, M-Mode  and complete spectral and color flow Doppler.  Study Date: 10/19/2021  INDICATION: Cardiomyopathy, unspecified (I42.9)  HISTORY:    DIMENSIONS:  Dimensions:     Normal Values:  LA:     4.9 cm    2.0 - 4.0 cm  Ao:     3.9 cm    2.0 - 3.8 cm  SEPTUM: 0.9 cm    0.6 - 1.2 cm  PWT:    1.0 cm    0.6 - 1.1 cm  LVIDd:  5.9 cm    3.0 - 5.6 cm  LVIDs:  5.9 cm    1.8 - 4.0 cm    Derived Variables:  LVMI: 101 g/m2  RWT: 0.33  Ejection Fraction Visual Estimate: 30-35 %  Ejection Fraction Stevens: 33 %    OBSERVATIONS:  Mitral Valve: Normal mitral valve. Mild mitral  regurgitation.  Aortic Root: Aortic Root: 3.9 cm.    Aortic Valve: Calcified trileaflet aortic valve with normal  opening. Mild aortic regurgitation.  Left Atrium: Normal left atrium.  LA volume index = 31  cc/m2.  Left Ventricle: Dilated lv with severe segmental left  ventricular systolic dysfunction.The distal  septum,apex,inferiorand infero-lateral walls are  hypokinetic EF=30-35%. Normal left ventricular internal  dimensions and wall thicknesses. Grade I diastolic  dysfunction (Impaired relaxation, mild).  Right Heart: Normal right atrium. Normal right ventricular  size and systolic function (TAPSE  1.7cm). There is mild  tricuspid regurgitation. Normal pulmonic valve.  Pericardium/PleuraNormal pericardium with no pericardial  effusion.  Hemodynamic: RV systolic pressure is severely increased at  65 mm Hg.    CONCLUSIONS:  1. Normal mitral valve. Mild mitral regurgitation.  2. Calcified trileaflet aortic valve with normal opening.  Mild aortic regurgitation.  3. Aortic Root: 3.9 cm.  4. Normal left atrium.  LA volume index = 31 cc/m2.  5. Normal left ventricular internal dimensions and wall  thicknesses.  6. Dilated lv with severe segmental left ventricular  systolic dysfunction.The distal septum,apex,inferiorand  infero-lateral walls are hypokinetic EF=30-35%.  7. Grade I diastolic dysfunction (Impaired relaxation,  mild).  8. Normal right atrium.  9. Normal right ventricular size and systolic function  (TAPSE  1.7cm).  10. RV systolic pressure is severely increased at  65 mm  Hg.  11. There is mild tricuspid regurgitation.  12. Normal pulmonic valve.  13. Normal pericardium with no pericardial effusion.    ------------------------------------------------------------------------    EXAM:  XR FOOT COMP MIN 3 VIEWS RT                        PROCEDURE DATE:  10/20/2021    INTERPRETATION:  Right foot  HISTORY: Postop  COMPARISON: 10/15/2021   Two views of the right foot show amputation of the second and third toes. Wound VAC device is present. The joint spaces are maintained.  IMPRESSION: Postoperative changes.    ------------------------------------------------------------------------    EXAM:  XR FOOT 2 VIEWS RT                        PROCEDURE DATE:  10/27/2021    INTERPRETATION:  RIGHT foot  CLINICAL INFORMATION: Postop foot  TECHNIQUE: AP,lateral views.  FINDINGS:  Status post second and third phalangectomies.  Compared to prior 10/20/2021 RIGHT foot radiographs current exam shows air lucencies surrounding the head of third metatarsal bone with subarticular lucencies concerning for osteomyelitis.  RIGHT second metatarsal head osteotomy site intact.  Remaining osseous joint structures of the RIGHT foot within normal limits.  There are diffuse arterial vascular wall calcifications.  IMPRESSION:  Suspect osteomyelitis/osteolysis of the distal remnant third metatarsal head.  Confirmatory MRI recommended.

## 2021-10-29 ENCOUNTER — TRANSCRIPTION ENCOUNTER (OUTPATIENT)
Age: 51
End: 2021-10-29

## 2021-10-29 VITALS
TEMPERATURE: 98 F | HEART RATE: 88 BPM | SYSTOLIC BLOOD PRESSURE: 107 MMHG | DIASTOLIC BLOOD PRESSURE: 74 MMHG | RESPIRATION RATE: 17 BRPM | OXYGEN SATURATION: 100 %

## 2021-10-29 LAB
GLUCOSE BLDC GLUCOMTR-MCNC: 140 MG/DL — HIGH (ref 70–99)
GLUCOSE BLDC GLUCOMTR-MCNC: 175 MG/DL — HIGH (ref 70–99)
GLUCOSE BLDC GLUCOMTR-MCNC: 218 MG/DL — HIGH (ref 70–99)

## 2021-10-29 PROCEDURE — 87635 SARS-COV-2 COVID-19 AMP PRB: CPT

## 2021-10-29 PROCEDURE — 83605 ASSAY OF LACTIC ACID: CPT

## 2021-10-29 PROCEDURE — 85652 RBC SED RATE AUTOMATED: CPT

## 2021-10-29 PROCEDURE — 86901 BLOOD TYPING SEROLOGIC RH(D): CPT

## 2021-10-29 PROCEDURE — 85027 COMPLETE CBC AUTOMATED: CPT

## 2021-10-29 PROCEDURE — 73718 MRI LOWER EXTREMITY W/O DYE: CPT

## 2021-10-29 PROCEDURE — 82962 GLUCOSE BLOOD TEST: CPT

## 2021-10-29 PROCEDURE — C1769: CPT

## 2021-10-29 PROCEDURE — 71045 X-RAY EXAM CHEST 1 VIEW: CPT

## 2021-10-29 PROCEDURE — 86900 BLOOD TYPING SEROLOGIC ABO: CPT

## 2021-10-29 PROCEDURE — 81001 URINALYSIS AUTO W/SCOPE: CPT

## 2021-10-29 PROCEDURE — 86850 RBC ANTIBODY SCREEN: CPT

## 2021-10-29 PROCEDURE — 87070 CULTURE OTHR SPECIMN AEROBIC: CPT

## 2021-10-29 PROCEDURE — 85730 THROMBOPLASTIN TIME PARTIAL: CPT

## 2021-10-29 PROCEDURE — 93306 TTE W/DOPPLER COMPLETE: CPT

## 2021-10-29 PROCEDURE — 80053 COMPREHEN METABOLIC PANEL: CPT

## 2021-10-29 PROCEDURE — C1894: CPT

## 2021-10-29 PROCEDURE — C1887: CPT

## 2021-10-29 PROCEDURE — 73620 X-RAY EXAM OF FOOT: CPT

## 2021-10-29 PROCEDURE — 80202 ASSAY OF VANCOMYCIN: CPT

## 2021-10-29 PROCEDURE — 85610 PROTHROMBIN TIME: CPT

## 2021-10-29 PROCEDURE — 86769 SARS-COV-2 COVID-19 ANTIBODY: CPT

## 2021-10-29 PROCEDURE — 73630 X-RAY EXAM OF FOOT: CPT

## 2021-10-29 PROCEDURE — 88311 DECALCIFY TISSUE: CPT

## 2021-10-29 PROCEDURE — 84100 ASSAY OF PHOSPHORUS: CPT

## 2021-10-29 PROCEDURE — 86140 C-REACTIVE PROTEIN: CPT

## 2021-10-29 PROCEDURE — 93005 ELECTROCARDIOGRAM TRACING: CPT

## 2021-10-29 PROCEDURE — 36415 COLL VENOUS BLD VENIPUNCTURE: CPT

## 2021-10-29 PROCEDURE — 96375 TX/PRO/DX INJ NEW DRUG ADDON: CPT

## 2021-10-29 PROCEDURE — 93923 UPR/LXTR ART STDY 3+ LVLS: CPT

## 2021-10-29 PROCEDURE — 83036 HEMOGLOBIN GLYCOSYLATED A1C: CPT

## 2021-10-29 PROCEDURE — 76000 FLUOROSCOPY <1 HR PHYS/QHP: CPT

## 2021-10-29 PROCEDURE — 80048 BASIC METABOLIC PNL TOTAL CA: CPT

## 2021-10-29 PROCEDURE — 83735 ASSAY OF MAGNESIUM: CPT

## 2021-10-29 PROCEDURE — 87077 CULTURE AEROBIC IDENTIFY: CPT

## 2021-10-29 PROCEDURE — 85025 COMPLETE CBC W/AUTO DIFF WBC: CPT

## 2021-10-29 PROCEDURE — 99285 EMERGENCY DEPT VISIT HI MDM: CPT | Mod: 25

## 2021-10-29 PROCEDURE — 87075 CULTR BACTERIA EXCEPT BLOOD: CPT

## 2021-10-29 PROCEDURE — 88304 TISSUE EXAM BY PATHOLOGIST: CPT

## 2021-10-29 PROCEDURE — 87186 SC STD MICRODIL/AGAR DIL: CPT

## 2021-10-29 PROCEDURE — 88305 TISSUE EXAM BY PATHOLOGIST: CPT

## 2021-10-29 PROCEDURE — 96374 THER/PROPH/DIAG INJ IV PUSH: CPT

## 2021-10-29 PROCEDURE — 99239 HOSP IP/OBS DSCHRG MGMT >30: CPT | Mod: GC

## 2021-10-29 RX ORDER — SACUBITRIL AND VALSARTAN 24; 26 MG/1; MG/1
1 TABLET, FILM COATED ORAL
Qty: 60 | Refills: 0
Start: 2021-10-29 | End: 2021-11-27

## 2021-10-29 RX ORDER — ATORVASTATIN CALCIUM 80 MG/1
1 TABLET, FILM COATED ORAL
Qty: 30 | Refills: 0
Start: 2021-10-29 | End: 2021-11-27

## 2021-10-29 RX ORDER — ASPIRIN/CALCIUM CARB/MAGNESIUM 324 MG
1 TABLET ORAL
Qty: 0 | Refills: 0 | DISCHARGE
Start: 2021-10-29

## 2021-10-29 RX ORDER — FUROSEMIDE 40 MG
1 TABLET ORAL
Qty: 30 | Refills: 0
Start: 2021-10-29 | End: 2021-11-27

## 2021-10-29 RX ORDER — POLYETHYLENE GLYCOL 3350 17 G/17G
17 POWDER, FOR SOLUTION ORAL
Qty: 510 | Refills: 0
Start: 2021-10-29 | End: 2021-11-27

## 2021-10-29 RX ADMIN — Medication 0.5 MILLIGRAM(S): at 11:02

## 2021-10-29 RX ADMIN — CHLORHEXIDINE GLUCONATE 1 APPLICATION(S): 213 SOLUTION TOPICAL at 06:41

## 2021-10-29 NOTE — PROGRESS NOTE ADULT - ATTENDING COMMENTS
Patient was seen and examined at bedside   Denies any complains except anxiety about discharge    Vitals noted    P/E:  As above     Labs noted    A/P:  Wound vac to arrive today; wound care instructions on discharge  Clear margin on surgical pathology result   Can be discharged off antibiotics  Again counselled patient about outpatient follow up and medication compliance with Dr. Fairchild at bedside

## 2021-10-29 NOTE — PROGRESS NOTE ADULT - PROBLEM SELECTOR PLAN 1
Cont Vancomycin and Meropenem (Cefepime discontinued due to Tissue Cx. E. Coli ESBL 10/24)  Vanc Trough 10/27 - 17.0  Dr Mesa - ID consulted  Cx: Few Streptococcus agalactiae (Group B) isolated sensitive to Ampicillin and Penicillin  MRI:  Findings favored to represent osteomyelitis involving second and third digit proximal phalanges.  Extensive overlying soft tissue infection involving the second and third digits with surrounding edema and phlegmon, may represent abscess-in-formation. Additionally, multiple rounded foci of hypointensity in soft tissue at the second and third digits may be related to vascular atherosclerosis versus foci of soft tissue gas.  MIGDALIA/PVR shows noncompressible vessels - may benefit form antiplatelet therapy once podiatrically stable  Vascular consulted - Angiogram shows Inline flow to R foot via dominant PT and smaller peroneal with AT occlusion  Podiatry consulted - pt. had 2nd and 3rd digit of R. Foot amputated on 10/20  c/w Abx as per ID  Pain Management: oxycodone 5 mG/acetaminophen 325 mG PRN q6h  Podiatry completed primary wound closure 10/26  Surgical Pathology is negative for osteomyelitis, patient can be discharged without any antibiotics  Pt. to be discharged after podiatry wound vac. placement

## 2021-10-29 NOTE — DISCHARGE NOTE NURSING/CASE MANAGEMENT/SOCIAL WORK - PATIENT PORTAL LINK FT
You can access the FollowMyHealth Patient Portal offered by [facility] by registering at the following website: [email or web address]. By joining [employer]’s FollowMyHealth portal, you will also be able to view your health information using other applications (apps) compatible with our system.

## 2021-10-29 NOTE — PROGRESS NOTE ADULT - SUBJECTIVE AND OBJECTIVE BOX
PGY-1 Progress Note discussed with attending    PAGER #: [[phone]] TILL 5:00 PM  PLEASE CONTACT ON CALL TEAM:  - On Call Team (Please refer to Quintin) FROM 5:00 PM - 8:30PM  - Nightfloat Team FROM 8:30 -7:30 AM    CHIEF COMPLAINT & BRIEF HOSPITAL COURSE:  51 year old male with PMH CVA (10 years ago, no deficits), HTN, DM, CHF (EF 30-35% 2/21), presented with right foot swelling. Patient stated two weeks ago he noticed bleeding in his shoes and found a staple stuck to the bottom of his feet. He didn't notice it before because he states he doesn't have any sensation in his feet. He went to urgent care, where they prescribed him Keflex Q8hrs. Patient states the foot started to swell more so he returned to urgent care yesterday where they gave him a prescription of clindamycin. Patient this morning started to endorse chills, nausea, but denies any fever, vomiting, chest pain, SOB, abdominal pain, or changes in bowel habits.  Patient was admitted in February due to covid, and was found to have CHF and DM. Patient has not been complaint with any of his medications and states he just takes Lasix and Xanax. MRI 10/18 shows proximal OM of 2nd and 3rd digit of R. Foot. Angiogram 10/19 shows Inline flow to R foot via dominant PT and smaller peroneal with AT occlusion. Pt. had amputation of the 2nd and 3rd digit on the R. foot on 10/20. Pt. Tissue Cx shows E.coli ESBL. Given Vanc. and Meropenem. Planned had delayed primary closure of wound on 10/26. Surgical pathology shows no OM. Pt. planned for discharge.    INTERVAL HPI/OVERNIGHT EVENTS:   Pt. s/p 2nd and 3rd digit amputation of R. Foot POD#9. Pt. had primary closure of wound 3 days ago. Describes moderate, throbbing R. foot pain rated 5/10. Pt. displays high anxiety today as he wants to be discharged and tend to personal matters. Pt. to be discharged after wound vac placement by podiatry.     MEDICATIONS  (STANDING):  aspirin enteric coated 81 milliGRAM(s) Oral daily  atorvastatin 80 milliGRAM(s) Oral at bedtime  chlorhexidine 2% Cloths 1 Application(s) Topical <User Schedule>  enoxaparin Injectable 40 milliGRAM(s) SubCutaneous daily  influenza   Vaccine 0.5 milliLiter(s) IntraMuscular once  insulin lispro (ADMELOG) corrective regimen sliding scale   SubCutaneous Before meals and at bedtime  insulin lispro Injectable (ADMELOG) 2 Unit(s) SubCutaneous three times a day before meals  lactated ringers. 1000 milliLiter(s) (75 mL/Hr) IV Continuous <Continuous>  melatonin 3 milliGRAM(s) Oral at bedtime  sacubitril 24 mG/valsartan 26 mG 1 Tablet(s) Oral two times a day  senna 2 Tablet(s) Oral at bedtime    MEDICATIONS  (PRN):  acetaminophen     Tablet .. 650 milliGRAM(s) Oral every 6 hours PRN Mild Pain (1 - 3)  ALPRAZolam 0.5 milliGRAM(s) Oral two times a day PRN Anxiety  morphine  - Injectable 2 milliGRAM(s) IV Push every 6 hours PRN Severe Pain (7 - 10)  ondansetron Injectable 4 milliGRAM(s) IV Push every 6 hours PRN Nausea and/or Vomiting  oxycodone    5 mG/acetaminophen 325 mG 1 Tablet(s) Oral every 4 hours PRN Moderate Pain (4 - 6)      REVIEW OF SYSTEMS:  CONSTITUTIONAL: No fever, chills, weight loss, + fatigue  RESPIRATORY: No dry cough, Denies wheezing, chills or hemoptysis; No shortness of breath  CARDIOVASCULAR: No chest pain, palpitations, dizziness, or leg swelling  GASTROINTESTINAL: No abdominal pain. No vomiting, or hematemesis; No diarrhea or constipation. No melena or hematochezia.  GENITOURINARY: No dysuria or hematuria, urinary frequency  NEUROLOGICAL: No headaches, memory loss, loss of strength  MSK: Right foot pain, dull, moderate 5/10  SKIN: Slightly bruising and discoloration on both anterior legs    Vital Signs Last 24 Hrs  T(C): 36.9 (28 Oct 2021 05:25), Max: 36.9 (28 Oct 2021 05:25)  T(F): 98.4 (28 Oct 2021 05:25), Max: 98.4 (28 Oct 2021 05:25)  HR: 94 (28 Oct 2021 05:25) (94 - 99)  BP: 113/77 (28 Oct 2021 05:25) (112/82 - 113/79)  BP(mean): 89 (27 Oct 2021 13:25) (89 - 89)  RR: 16 (28 Oct 2021 05:25) (16 - 17)  SpO2: 100% (28 Oct 2021 05:25) (100% - 100%)    PHYSICAL EXAMINATION:  GENERAL: NAD, well built  HEAD:  Atraumatic, Normocephalic  EYES:  conjunctiva and sclera clear  NECK: Supple, No JVD, Normal thyroid  CHEST/LUNG: Lungs CTA. No rales, rhonchi, wheezing, or rubs  HEART: Regular rate and rhythm; No murmurs, rubs, or gallops  ABDOMEN: Soft, Nontender, Nondistended; Bowel sounds present  NERVOUS SYSTEM:  Alert & Oriented X3  EXTREMITIES:  1+ pitting edema b/l 2+ Peripheral Pulses, No clubbing, cyanosis  SKIN: s/p R. Foot 2nd and 3rd digit amputation, wound dressed.                                                 10.0   7.12  )-----------( 269      ( 28 Oct 2021 07:04 )             30.4       138  |  103  |  20<H>  ----------------------------<  156<H>  4.1   |  29  |  1.17    Ca    9.2      28 Oct 2021 07:04      Vancomycin Level, Trough: 17.0 (10.27.21 @ 04:40)       CAPILLARY BLOOD GLUCOSE      POCT Blood Glucose.: 140 mg/dL (29 Oct 2021 07:44)  POCT Blood Glucose.: 145 mg/dL (28 Oct 2021 21:10)  POCT Blood Glucose.: 165 mg/dL (28 Oct 2021 16:36)  POCT Blood Glucose.: 178 mg/dL (28 Oct 2021 11:25)      RADIOLOGY & ADDITIONAL TESTS:    EXAM:  US PHYSIOL LWR EXT 3+ LEV BI                        PROCEDURE DATE:  10/16/2021    INTERPRETATION:  Clinical indication: Ischemic toe  COMPARISON: None  FINDINGS: There are pulsatile waveforms bilaterally. Segmental pressures could not be obtained due to calcified, noncompressible vessels. Therefore, ABIs could not be calculated.  IMPRESSION: ABIs could not be calculated due to calcified, noncompressible vessels.    ------------------------------------------------------------------------    EXAM:  XR FOOT 2 VIEWS RT                        PROCEDURE DATE:  10/15/2021    INTERPRETATION:  Cellulitis swollen right foot.  3 views right foot.  No fracture or dislocation. No focal bone lysis or unusual periosteal reaction. Joint spaces preserved. Small calcaneal osteophytes. Vascular calcification. Soft tissue swelling mostly over the dorsum of the foot may reflect cellulitis. No soft tissue gas.  IMPRESSION: No acute or destructive osseous pathology.  If there is clinical suspicion of osteomyelitis consider bone scan or MRI    ------------------------------------------------------------------------    EXAM:  MR FOOT RT                        PROCEDURE DATE:  10/18/2021    INTERPRETATION:  EXAMINATION: MR FOOT RIGHT  CLINICAL INDICATION: Evaluate for osteomyelitis  COMPARISON: Radiographs dated 10/15/2021  TECHNIQUE: Multiplanar, multi-sequence MRI of the right foot was performed without intravenous contrast.  INTERPRETATION:  Bones and soft tissues: There is no fracture. There is patchy STIR hyperintensity within the second digit proximal phalanx with mild patchy T1 hypointensity seen on the short axis TI images. Similar finding is seen at the base of the third digit proximal phalanx. The findings are favored to represent early osteomyelitis, with reactive osteitis considered less likely.  There is extensive surrounding soft tissue edema and phlegmon at the second and third digits which may represent abscess-in -formation with blistering of the overlying skin. Multiple foci of hypointensity seen on series 6 and 7 at the second and third digits may be related to vascular atherosclerosis versus foci of soft tissue gas. Advise radiographic correlation (prior radiographs are dated 10/15/2021).  Muscles: There is extensive edema within the intrinsic muscles of the foot which may be seen in neuropathy.  IMPRESSION:  1.  Findings favored to represent osteomyelitis involving second and third digit proximal phalanges.  2.  Extensive overlying soft tissue infection involving the second and third digits with surrounding edema and phlegmon, may represent abscess-in-formation. Additionally, multiple rounded foci of hypointensity in soft tissue at the second and third digits may be related to vascular atherosclerosis versus foci of soft tissue gas. Advise radiographic correlation (prior radiographs are dated 10/15/2021).    ------------------------------------------------------------------------    PROCEDURE: Transthoracic echocardiogram with 2-D, M-Mode  and complete spectral and color flow Doppler.  Study Date: 10/19/2021  INDICATION: Cardiomyopathy, unspecified (I42.9)  HISTORY:    DIMENSIONS:  Dimensions:     Normal Values:  LA:     4.9 cm    2.0 - 4.0 cm  Ao:     3.9 cm    2.0 - 3.8 cm  SEPTUM: 0.9 cm    0.6 - 1.2 cm  PWT:    1.0 cm    0.6 - 1.1 cm  LVIDd:  5.9 cm    3.0 - 5.6 cm  LVIDs:  5.9 cm    1.8 - 4.0 cm    Derived Variables:  LVMI: 101 g/m2  RWT: 0.33  Ejection Fraction Visual Estimate: 30-35 %  Ejection Fraction Stevens: 33 %    OBSERVATIONS:  Mitral Valve: Normal mitral valve. Mild mitral  regurgitation.  Aortic Root: Aortic Root: 3.9 cm.    Aortic Valve: Calcified trileaflet aortic valve with normal  opening. Mild aortic regurgitation.  Left Atrium: Normal left atrium.  LA volume index = 31  cc/m2.  Left Ventricle: Dilated lv with severe segmental left  ventricular systolic dysfunction.The distal  septum,apex,inferiorand infero-lateral walls are  hypokinetic EF=30-35%. Normal left ventricular internal  dimensions and wall thicknesses. Grade I diastolic  dysfunction (Impaired relaxation, mild).  Right Heart: Normal right atrium. Normal right ventricular  size and systolic function (TAPSE  1.7cm). There is mild  tricuspid regurgitation. Normal pulmonic valve.  Pericardium/PleuraNormal pericardium with no pericardial  effusion.  Hemodynamic: RV systolic pressure is severely increased at  65 mm Hg.    CONCLUSIONS:  1. Normal mitral valve. Mild mitral regurgitation.  2. Calcified trileaflet aortic valve with normal opening.  Mild aortic regurgitation.  3. Aortic Root: 3.9 cm.  4. Normal left atrium.  LA volume index = 31 cc/m2.  5. Normal left ventricular internal dimensions and wall  thicknesses.  6. Dilated lv with severe segmental left ventricular  systolic dysfunction.The distal septum,apex,inferiorand  infero-lateral walls are hypokinetic EF=30-35%.  7. Grade I diastolic dysfunction (Impaired relaxation,  mild).  8. Normal right atrium.  9. Normal right ventricular size and systolic function  (TAPSE  1.7cm).  10. RV systolic pressure is severely increased at  65 mm  Hg.  11. There is mild tricuspid regurgitation.  12. Normal pulmonic valve.  13. Normal pericardium with no pericardial effusion.    ------------------------------------------------------------------------    EXAM:  XR FOOT COMP MIN 3 VIEWS RT                        PROCEDURE DATE:  10/20/2021    INTERPRETATION:  Right foot  HISTORY: Postop  COMPARISON: 10/15/2021   Two views of the right foot show amputation of the second and third toes. Wound VAC device is present. The joint spaces are maintained.  IMPRESSION: Postoperative changes.    ------------------------------------------------------------------------    EXAM:  XR FOOT 2 VIEWS RT                        PROCEDURE DATE:  10/27/2021    INTERPRETATION:  RIGHT foot  CLINICAL INFORMATION: Postop foot  TECHNIQUE: AP,lateral views.  FINDINGS:  Status post second and third phalangectomies.  Compared to prior 10/20/2021 RIGHT foot radiographs current exam shows air lucencies surrounding the head of third metatarsal bone with subarticular lucencies concerning for osteomyelitis.  RIGHT second metatarsal head osteotomy site intact.  Remaining osseous joint structures of the RIGHT foot within normal limits.  There are diffuse arterial vascular wall calcifications.  IMPRESSION:  Suspect osteomyelitis/osteolysis of the distal remnant third metatarsal head.  Confirmatory MRI recommended.

## 2021-10-29 NOTE — PROGRESS NOTE ADULT - PROBLEM SELECTOR PLAN 6
Monitor Hb   Possibly anemia of chronic disease

## 2021-10-29 NOTE — PROGRESS NOTE ADULT - PROVIDER SPECIALTY LIST ADULT
Cardiology
Infectious Disease
Infectious Disease
Podiatry
Infectious Disease
Internal Medicine
Podiatry
Vascular Surgery
Vascular Surgery
Cardiology
Infectious Disease
Podiatry
Podiatry
Vascular Surgery
Internal Medicine

## 2021-10-29 NOTE — PROGRESS NOTE ADULT - REASON FOR ADMISSION
Cellulitis

## 2021-10-29 NOTE — PROGRESS NOTE ADULT - PROBLEM SELECTOR PROBLEM 8
Hyperlipidemia

## 2021-10-29 NOTE — PROGRESS NOTE ADULT - ASSESSMENT
A:  s/p 2nd and 3rd right toes amputation 10/20, delayed primary closure 10/26  Osteomyelitis R 2nd & 3rd proximal phalanges  Cellulitis RLE     P:  Pt seen and evaluated  DC planning - pending wound vac delivery   s/p 2nd and 3rd right toes amputation (10/20)  s/p delayed primary closure (10/26) - partial closure, wasn't able to fully close due to nonviable skin  Surgical path reviewed - no OM   Changed wound vac dressing  Applied DSD   Recommend nonWB to right foot, however can partially weight bear on heel as tolerated   Pod plan: pt stable from podiatry standpoint  Continue abx per primary team  Upon DC, pt to f/u outpatient in clinic [address]. Please call [phone] to make an appt.   WCO: Pt to dc on a wound vac. Apply adaptic first, then black foam to the opened wound, secure it with tegaderm. Apply clean 4x4 gauze and wrap it with paula, ACE bandage.   Discussed and evaluated at bedside with attending Dr. Jacobo

## 2021-10-29 NOTE — PROGRESS NOTE ADULT - PROBLEM SELECTOR PLAN 7
Pt. displays high anxiety   Does not want to c/w xanax currently  c/w low dose Alprazolam 0.25 mg twice daily as needed, began 10/24
Pt. displays high anxiety   Does not want to c/w xanax currently
Pt. displays high anxiety   Does not want to c/w xanax currently
Stable
Pt. displays high anxiety   Does not want to c/w xanax currently  c/w low dose Alprazolam 0.25 mg twice daily as needed, began 10/24
Pt. displays high anxiety   Does not want to c/w xanax currently
Pt. displays high anxiety   Does not want to c/w xanax currently
Stable
Pt. displays high anxiety   Does not want to c/w xanax currently  c/w low dose Alprazolam 0.25 mg twice daily as needed, began 10/24
Pt. displays high anxiety   Does not want to c/w xanax currently
Pt. displays high anxiety   Does not want to c/w xanax currently  c/w low dose Alprazolam 0.25 mg twice daily as needed, began 10/24
Stable
Pt. displays high anxiety   Does not want to c/w xanax currently  c/w low dose Alprazolam 0.25 mg twice daily as needed, began 10/24

## 2021-10-29 NOTE — PROGRESS NOTE ADULT - SUBJECTIVE AND OBJECTIVE BOX
Podiatry Interval HPI: Pt seen resting in bed s/p 2nd and 3rd toe amputation R foot 10/20 and delayed primary closure 10/26. Pt denies any acute overnight events. Pt denies any constitutional symptoms of N/V/C/F/Sob. Pt's wound vac has been approved by insurance. Will be delivered today.     Podiatry HPI: Podiatry consulted regarding a 52 yo male who presents to the ED for a right foot infection. Pt states that he stepped on a brass staple about 2 weeks ago and had no idea it punctured through the skin until the next morning when he started to feel pain. Pt states that he is diabetic so his sensation in his feet is diminished. Pt reports that he went to the urgent care and was prescribed keflex which he finished. He was also told by the urgent care to soak the foot in epsom salt and apply bacitracin cream on it. Pt reports that soaking it made it worse and turned into a infection a few days ago. Pt is complaining 6/10 pain on the right foot. Admits to having chills. Pt denies constitutional symptoms of N/V/C/F/Sob.     HPI: Patient is a 52 y/o male who presents with worsening right foot pain and wound x2 weeks for which he was prescribed keflex course which he completed and a clindamycin course which he just started. Patient has PMH significant for DM, CHF. Patient reports that a "contractor grade staple" punctured his foot and he didn't realize until the next morning when he couldn't bear weight. He reports he is up to date on his tetanus shot. He reports chills but denies fever as he "did not check it" and ascending leg pain at times. Denies shortness of breath, chest pain or pressure, nausea or vomiting.      Medications acetaminophen     Tablet .. 650 milliGRAM(s) Oral every 6 hours PRN  ALPRAZolam 0.5 milliGRAM(s) Oral two times a day PRN  aspirin enteric coated 81 milliGRAM(s) Oral daily  atorvastatin 80 milliGRAM(s) Oral at bedtime  chlorhexidine 2% Cloths 1 Application(s) Topical <User Schedule>  enoxaparin Injectable 40 milliGRAM(s) SubCutaneous daily  insulin lispro (ADMELOG) corrective regimen sliding scale   SubCutaneous Before meals and at bedtime  insulin lispro Injectable (ADMELOG) 2 Unit(s) SubCutaneous three times a day before meals  lactated ringers. 1000 milliLiter(s) IV Continuous <Continuous>  melatonin 3 milliGRAM(s) Oral at bedtime  morphine  - Injectable 2 milliGRAM(s) IV Push every 6 hours PRN  ondansetron Injectable 4 milliGRAM(s) IV Push every 6 hours PRN  oxycodone    5 mG/acetaminophen 325 mG 1 Tablet(s) Oral every 4 hours PRN  sacubitril 24 mG/valsartan 26 mG 1 Tablet(s) Oral two times a day  senna 2 Tablet(s) Oral at bedtime    FH: No pertinent family history    ,   PMH: CVA (cerebral vascular accident)    HTN (hypertension)    Anxiety    DM (diabetes mellitus)    Chronic CHF    Chronic systolic congestive heart failure       PSH: No pertinent past surgical history        Labs                          10.0   7.12  )-----------( 269      ( 28 Oct 2021 07:04 )             30.4      10-28    138  |  103  |  20<H>  ----------------------------<  156<H>  4.1   |  29  |  1.17    Ca    9.2      28 Oct 2021 07:04       Vital Signs Last 24 Hrs  T(C): 36.5 (29 Oct 2021 05:58), Max: 36.6 (28 Oct 2021 13:52)  T(F): 97.7 (29 Oct 2021 05:58), Max: 97.9 (28 Oct 2021 19:54)  HR: 91 (29 Oct 2021 05:58) (89 - 94)  BP: 112/74 (29 Oct 2021 05:58) (110/72 - 113/77)  BP(mean): 81 (28 Oct 2021 13:52) (81 - 81)  RR: 17 (29 Oct 2021 05:58) (16 - 17)  SpO2: 100% (29 Oct 2021 05:58) (100% - 100%)  Sedimentation Rate, Erythrocyte: 98 mm/Hr (10-21-21 @ 04:40)  Sedimentation Rate, Erythrocyte: 99 mm/Hr (10-15-21 @ 13:37)         C-Reactive Protein, Serum: 42 mg/L (10-21-21 @ 14:41)  C-Reactive Protein, Serum: 106 mg/L (10-16-21 @ 01:09)     CAPILLARY BLOOD GLUCOSE    POCT Blood Glucose.: 140 mg/dL (29 Oct 2021 07:44)  POCT Blood Glucose.: 145 mg/dL (28 Oct 2021 21:10)  POCT Blood Glucose.: 165 mg/dL (28 Oct 2021 16:36)  POCT Blood Glucose.: 178 mg/dL (28 Oct 2021 11:25)    ROS: All others negative unless otherwise stated in the HPI        PHYSICAL EXAM  GEN: CLARIBEL REICH is a pleasant well-nourished, well developed 51y Male in no acute distress, alert awake, and oriented to person, place and time.   LE Focused:    Vasc: DP/PT pulses faintly palpable, CFT < 5 seconds to digits, TG warm to cool, pitting edema present on bilateral legs  Derm: Surgical site R 2nd and 3rd digit amputation left open with R 2nd & 3rd metatarsal heads visible. No purulent drainage noted. No streaking or erythema noted. No clinical signs of infection noted to R surgical site.  Neuro: Protective sensation grossly diminished  MSK: Able to wiggles toes. R 2nd & 3rd digit amputation     10/15: s/p R foot bedside I&D: tracking noted in all directions, probes to bone, serosanguinous drainage noted   10/29: Wound vac dressing intact, no signs of infection noted         Imaging:    EXAM:  MR FOOT RT                          PROCEDURE DATE:  10/18/2021      INTERPRETATION:  EXAMINATION: MR FOOT RIGHT    CLINICAL INDICATION:Evaluate for osteomyelitis    COMPARISON: Radiographs dated 10/15/2021    TECHNIQUE: Multiplanar, multi-sequence MRI of the right foot was performed without intravenous contrast.    INTERPRETATION:    Bones and soft tissues: There is no fracture. There is patchy STIR hyperintensity within the second digit proximal phalanx with mild patchy T1 hypointensity seen on the short axis TI images. Similar finding is seen at the base of the third digit proximal phalanx. The findings are favored to represent early osteomyelitis, with reactive osteitis considered less likely.    There is extensive surrounding soft tissue edema and phlegmon at the second and third digits which may represent abscess-in -formation with blistering of the overlying skin. Multiple foci of hypointensity seen on series 6 and 7 at the second and third digits may be related to vascular atherosclerosis versus foci of soft tissue gas. Advise radiographic correlation (prior radiographs are dated 10/15/2021).    Muscles: There is extensive edema within the intrinsic muscles of the foot which may be seen in neuropathy.    IMPRESSION:  1.  Findings favored to represent osteomyelitis involving second and third digit proximal phalanges.    2.  Extensive overlying soft tissue infection involving the second and third digits with surrounding edema and phlegmon, may represent abscess-in-formation. Additionally, multiple rounded foci of hypointensity in soft tissue at the second and third digits may be related to vascular atherosclerosis versus foci of soft tissue gas. Advise radiographic correlation (prior radiographs are dated 10/15/2021).      EXAM:  XR FOOT 2 VIEWS RT                          PROCEDURE DATE:  10/15/2021        INTERPRETATION:  Cellulitis swollen right foot.    3 views right foot.    No fracture or dislocation. No focal bone lysis or unusual periosteal reaction. Joint spaces preserved. Small calcaneal osteophytes. Vascular calcification. Soft tissue swelling mostly over the dorsum of the foot may reflect cellulitis. No soft tissue gas.    IMPRESSION: No acute or destructive osseous pathology.    If there is clinical suspicion of osteomyelitis consider bone scan or MRI        EXAM:  US PHYSIOL LWR EXT 3+ LEV BI                          PROCEDURE DATE:  10/16/2021      INTERPRETATION:  Clinical indication: Ischemic toe    COMPARISON: None    FINDINGS: There are pulsatile waveforms bilaterally. Segmental pressures could not be obtained due to calcified, noncompressible vessels. Therefore, ABIs could not be calculated.    IMPRESSION: ABIs could not be calculated due to calcified, noncompressible vessels.      Culture  Specimen Source: .Surgical Swab R foot plantar wound (10.16.21 @ 02:28) Culture Results:   Few Streptococcus agalactiae (Group B) isolated   Group B streptococci are susceptible to ampicillin,   penicillin and cefazolin, but may be resistant to   erythromycin and clindamycin.   Recommendations for intrapartum prophylaxis for Group B   streptococci are penicillin or ampicillin. (10.16.21 @ 02:28)     Surgical Pathology Report (10.20.21 @ 13:45)   Surgical Pathology Report:   ACCESSION No: [id number]   Patient: CLARIBEL REICH   Accession: [id number]   Collected Date/Time: 10/20/2021 13:45 EDT   Received Date/Time: 10/21/2021 08:00 EDT   Surgical Pathology Report - Auth (Verified)   Specimen(s) Submitted   1 Right 2nd metatarsal   2 Right 3rd metatarsal   3 Right foot 2nd and 3rd digit   Final Diagnosis   1. Right second metatarsal bone; biopsy:   Articular cartilage and bone with periarticularsoft tissue   2. Right third metatarsal bone; biopsy:   Scant articular bone and cartilage with periosseous soft tissue   3. Right second and third toes; amputation:   - Gangrenous necrosis with suppuration involving skin and soft tissue   - Bone without osteomyelitis   Verified by: Rhonda Hernández M.D.

## 2021-11-03 PROBLEM — I50.9 HEART FAILURE, UNSPECIFIED: Chronic | Status: ACTIVE | Noted: 2021-10-15

## 2021-11-03 PROBLEM — I50.22 CHRONIC SYSTOLIC (CONGESTIVE) HEART FAILURE: Chronic | Status: ACTIVE | Noted: 2021-10-20

## 2021-11-04 ENCOUNTER — OUTPATIENT (OUTPATIENT)
Dept: OUTPATIENT SERVICES | Facility: HOSPITAL | Age: 51
LOS: 1 days | End: 2021-11-04
Payer: COMMERCIAL

## 2021-11-04 ENCOUNTER — APPOINTMENT (OUTPATIENT)
Dept: PODIATRY | Facility: CLINIC | Age: 51
End: 2021-11-04

## 2021-11-04 VITALS
TEMPERATURE: 97.7 F | HEART RATE: 93 BPM | OXYGEN SATURATION: 99 % | RESPIRATION RATE: 18 BRPM | DIASTOLIC BLOOD PRESSURE: 73 MMHG | SYSTOLIC BLOOD PRESSURE: 106 MMHG

## 2021-11-04 DIAGNOSIS — Z00.00 ENCOUNTER FOR GENERAL ADULT MEDICAL EXAMINATION WITHOUT ABNORMAL FINDINGS: ICD-10-CM

## 2021-11-04 DIAGNOSIS — Z83.3 FAMILY HISTORY OF DIABETES MELLITUS: ICD-10-CM

## 2021-11-04 DIAGNOSIS — S91.301A UNSPECIFIED OPEN WOUND, RIGHT FOOT, INITIAL ENCOUNTER: ICD-10-CM

## 2021-11-04 DIAGNOSIS — Z78.9 OTHER SPECIFIED HEALTH STATUS: ICD-10-CM

## 2021-11-04 DIAGNOSIS — E11.9 TYPE 2 DIABETES MELLITUS W/OUT COMPLICATIONS: ICD-10-CM

## 2021-11-04 PROCEDURE — 11042 DBRDMT SUBQ TIS 1ST 20SQCM/<: CPT

## 2021-11-04 PROCEDURE — G0463: CPT

## 2021-11-04 RX ORDER — ALPRAZOLAM 2 MG/1
TABLET ORAL
Refills: 0 | Status: ACTIVE | COMMUNITY

## 2021-11-04 RX ORDER — FUROSEMIDE 80 MG/1
TABLET ORAL
Refills: 0 | Status: ACTIVE | COMMUNITY

## 2021-11-04 RX ORDER — OXYCODONE HYDROCHLORIDE AND ACETAMINOPHEN 10; 325 MG/1; MG/1
TABLET ORAL
Refills: 0 | Status: ACTIVE | COMMUNITY

## 2021-11-04 NOTE — ASSESSMENT
[FreeTextEntry1] : PHYSICAL EXAM\par GEN: CLARIBEL REICH is a pleasant well-nourished, well developed 51y Male in no acute distress, alert awake, and oriented to person, place and time. \par LE Focused:  \par Vasc: DP/PT pulses faintly palpable, CFT < 5 seconds to digits, TG warm to cool, pitting edema present on bilateral legs\par Derm: Surgical site R 2nd and 3rd digit amputation left open with R 2nd & 3rd metatarsal heads exposed, increased granulation tissue, with fibrogranular wound measures 1.5cm x 1cm x 0.5cm. No purulent drainage noted. No tracking or tunneling, probes to bone. mild periwound erythema and edema consistent with post-surgical changes. maceration noted periwound. No proximal streaking, no increased in warmths. \par Neuro: Protective sensation grossly diminished\par MSK: Able to wiggles toes. R 2nd & 3rd digit amputation, no pain to palpation. muscle strength 5/5 in all quadrants. \par \par A:\par s/p 2nd and 3rd right toes amputation 10/20,\par delayed primary closure 10/26\par \par \par P:\par Pt seen and evaluated\par Right foot cleaned in the usual aseptic manner using chlorhexidine \par s/p 2nd and 3rd right toes amputation (10/20)\par s/p delayed primary closure (10/26) - partial closure, wasn't able to fully close due to nonviable skin\par Surgical path reviewed - no OM \par Excisional debridement using sterile curette down to including subcutaneous tissue with removal of fibrotic tissue\par Applied betadine DSD\par continue wound vac therapy at home with VNS\par Recommend N to right foot, however can partially weight bear on heel as tolerated \par Patient acknolwedge understanding\par Continue abx per primary team\par WCO: apply adaptic first, then black foam to the opened wound, secure it with tegaderm. Apply clean 4x4 gauze and wrap it with paula, ACE bandage. \par possible transition to topical oxygen therapy after NPWT pending clinical course\par RTC in 1 week for continued wound care \par \par

## 2021-11-04 NOTE — HISTORY OF PRESENT ILLNESS
[FreeTextEntry1] : Patient is a 51 year old male with PMH CVA (10 years ago, no deficits), HTN, DM, CHF (EF 30-35% 2/21), presents to clinic for right foot infection follow up. Recently discharged from [facility] in octobers. s/p 2nd and 3rd toe amputation R foot 10/20 and delayed primary closure 10/26. Currently has VNS and using wound vac at home. Vac change tuesday & saturday. Patient admits to ambulating in surgical shoe using a cane dispite being told to remain NWB. Patient states he tries to keep weight to his heel. Patient was no discharged on PO antibiotics as surgical margin was clean without infection. No additional complaints. Report FBS today 11/4 is 150gm/dl. Patietn did not bring wound vac supplies. Pt denies any constitutional symptoms of N/V/C/F/Sob. \par

## 2021-11-05 DIAGNOSIS — L97.512 NON-PRESSURE CHRONIC ULCER OF OTHER PART OF RIGHT FOOT WITH FAT LAYER EXPOSED: ICD-10-CM

## 2021-11-05 DIAGNOSIS — E11.621 TYPE 2 DIABETES MELLITUS WITH FOOT ULCER: ICD-10-CM

## 2023-02-15 NOTE — ED ADULT NURSE NOTE - NSFALLRSKHARMRISK_ED_ALL_ED
[Dyspnea on exertion] : dyspnea during exertion [Joint Pain] : joint pain [Joint Stiffness] : joint stiffness [Weakness] : weakness [Negative] : Heme/Lymph no

## 2023-05-16 NOTE — PROGRESS NOTE ADULT - ASSESSMENT
Patient is a 51 year old male from home, with PMH of DM, CHF, CVA, HTN and anxiety, who presented to the ED due to right foot pain. Patient is s/p 2nd and 3rd right digit amputation for OM.     Assessment/Plan:    1. CHF   - patient with history of CHF   - ECHO showing EF of 30-35%; GIDD  - patient noncompliant with home med of coreg; refusing it while in hospital as well  - recommend to start entresto 26-24 BID  - can hold off on coreg for now   - fluid restrictions; strict I/O; daily weight   - encourage medication compliance     2. HTN   - monitor BP   - start entresto   - encourage medication compliance   - DASH diet     3. Right 2nd and 3rd metatarsal OM  - patient s/p 2nd and 3rd digit amputation 10/20   - continue abx as per ID   - rest of care as per podiatry and primary team  Bactrim Pregnancy And Lactation Text: This medication is Pregnancy Category D and is known to cause fetal risk.  It is also excreted in breast milk.

## 2023-11-04 NOTE — PROGRESS NOTE ADULT - PROBLEM SELECTOR PROBLEM 1
Cellulitis of right foot
No

## 2024-03-13 NOTE — DIETITIAN INITIAL EVALUATION ADULT. - HEIGHT FOR BMI (INCHES)
"Anesthesia Transfer of Care Note    Patient: Lesley Allan    Procedure(s) Performed: Procedure(s) (LRB):  ESOPHAGOGASTRODUODENOSCOPY (EGD) (N/A)  COLONOSCOPY (N/A)    Patient location: PACU    Anesthesia Type: general    Transport from OR: Transported from OR on room air with adequate spontaneous ventilation    Post pain: adequate analgesia    Post assessment: no apparent anesthetic complications and tolerated procedure well    Post vital signs: stable    Level of consciousness: lethargic and responds to stimulation    Nausea/Vomiting: no nausea/vomiting    Complications: none    Transfer of care protocol was followed      Last vitals: Visit Vitals  /87 (BP Location: Right arm, Patient Position: Sitting)   Pulse (!) 54   Temp 36.3 °C (97.3 °F) (Skin)   Resp 18   Ht 6' 3" (1.905 m)   Wt 98 kg (216 lb)   SpO2 98%   BMI 27.00 kg/m²     "
1

## 2024-07-02 NOTE — DISCHARGE NOTE PROVIDER - NSDCCPCAREPLAN_GEN_ALL_CORE_FT
Pt called to confirm the details of upcoming apt. No other questions.   PRINCIPAL DISCHARGE DIAGNOSIS  Diagnosis: CHF (congestive heart failure)  Assessment and Plan of Treatment: You came complaining of shortness of breath and chest pain. You were   diagnosed with CHF.  On this admission, your CXR showed bilateral pleural effsuions which means fluid in your lungs. You were treated with IV Lasix XXX which increases diuresis helping to relieve your shortness of breath and leg swelling. You were seen by Cardiologist, started on CARVEDILOL, LASIX AND LISINOPRIL.  You are recommended to weigh yourself daily and If you gain 3lbs in 3 days, or 5lbs in a week and /or have any swelling or increased swelling in your feet, ankles, and/or stomach call your Health Care Provider.  Do not eat or drink foods containing more than 2000 mg of salt (sodium) in your diet every day and limit your fluid intake to 1 Liter daily.  Please take CARVEDILOL 3.125 MG TWICE A DAY, LASIX 40 MG TWICE A DAY AND LISINOPRIL 20 MG DAILY as prescribed and follow up with your PCP/Cardiologist in 1 week from discharge.        SECONDARY DISCHARGE DIAGNOSES  Diagnosis: Demand ischemia of myocardium  Assessment and Plan of Treatment: You were diagnosed with Demand ischemia of myocardium. Your cardiac enzymes were elevated, EKG showed Q waves in leads V1-V3 most likely from congestive heart failure exacerbation.    Diagnosis: COVID-19  Assessment and Plan of Treatment: You were diagnosed with COVID19 infection. You were treated with Remdesivir and Decadron. You required oxygen supplementation to maintain your oxygen levels then transitioned to room air.   Based on your current clinical status and stability, it has been determined that you no longer need hospitalization and can recover while remaining in self-quarantine at home. You should follow the prevention steps below:  1. You should restrict activities outside your home, except for getting medical care.   2. Do not go to work, school, or public areas.   3. Avoid using public transportation, ride-sharing, or taxis.   4. Separate yourself from other people and animals in your home as much as possible.  When you are around other people (e.g., sharing a room or vehicle) you should wear a facemask.  5. Wash your hands often with soap and water for at least 20 seconds, especially after blowing your nose, coughing, or sneezing; going to the bathroom; and before eating or preparing food.  6. Cover your mouth and nose with a tissue when you cough or sneeze. Throw used tissues in a lined trash can. Immediately wash your hands with soap and water for at least 20 seconds  7. High touch surfaces include counters, tabletops, doorknobs, bathroom fixtures, toilets, phones, keyboards, tablets, and bedside tables.  8. Avoid sharing dishes, drinking glasses, cups, eating utensils, towels, or bedding with other people or pets in your home. After using these items, they should be washed thoroughly with soap and water.  9. You are strongly advised to seek prompt medical attention if your illness worsens or you develop new symptoms like fever or difficulty breathing, oxygen saturation <92%.  You are being discharge on ASPIRIN DAILY FOR 30 DAYS, OXYGEN SENSOR TO USE 3 TIMES A DAY, O2 SATURATION SHOULD BE >92%. Please follow up with your PCP in a week from discharge      Diagnosis: HTN (hypertension)  Assessment and Plan of Treatment: You have a history of Hypertension. XXXX You have been diagnosed with Hypertension.   On this admission, your Blood Pressure was adequately controlled with XXXXXX Amlodipine, Losartan, Lisinopril, Lasix, Hydralazine, Clonidine.   Your blood pressure target is 120-140/80-90, maintain healthy lifestyle, low salt diet, avoid fatty food, weight loss, exercise regularly as tolerated 30 mins X 3 times per week.  Notify your doctor if you have any of the following symptoms:   (Dizziness, Lightheadedness, Blurry vision, Headache, Chest pain, Shortness of breath.)  Please follow-up with your PCP in 1 week from discharge.      Diagnosis: DM (diabetes mellitus)  Assessment and Plan of Treatment: You have a history of diabetes.   Your HbA1c was XXXX during this admission.  You need to continue monitoring your blood sugar levels closely and maintain healthy lifestyle by eating healthy diabetic regimen, weight loss and exercise regularly as tolerated.  Please continue to take your medications as prescribed.   Please follow up with your PCP/Endocrinologist within a week of discharge.      Diagnosis: Anxiety  Assessment and Plan of Treatment: Anxiety     PRINCIPAL DISCHARGE DIAGNOSIS  Diagnosis: CHF (congestive heart failure)  Assessment and Plan of Treatment: You came complaining of shortness of breath and chest pain. You were   diagnosed with CHF.  On this admission, your CXR showed bilateral pleural effsuions which means fluid in your lungs. You were treated with IV Lasix XXX which increases diuresis helping to relieve your shortness of breath and leg swelling. You were seen by Cardiologist, started on CARVEDILOL, LASIX AND LISINOPRIL.  You are recommended to weigh yourself daily and If you gain 3lbs in 3 days, or 5lbs in a week and /or have any swelling or increased swelling in your feet, ankles, and/or stomach call your Health Care Provider.  Do not eat or drink foods containing more than 2000 mg of salt (sodium) in your diet every day and limit your fluid intake to 1 Liter daily.  Please take CARVEDILOL 3.125 MG TWICE A DAY, LASIX 40 MG ONCE A DAY AND LISINOPRIL 20 MG DAILY as prescribed and follow up with Cardiologist Dr Fairchild on Tuesday 02/16/2021.        SECONDARY DISCHARGE DIAGNOSES  Diagnosis: Demand ischemia of myocardium  Assessment and Plan of Treatment: You were diagnosed with Demand ischemia of myocardium. Your cardiac enzymes were elevated, EKG showed Q waves in leads V1-V3 most likely from congestive heart failure exacerbation.    Diagnosis: COVID-19  Assessment and Plan of Treatment: You were diagnosed with COVID19 infection. You did not required oxygen supplementation, Decadron or Remdesivir.    Based on your current clinical status and stability, it has been determined that you no longer need hospitalization and can recover while remaining in self-quarantine at home till 2/14/2021. You should follow the prevention steps below:  1. You should restrict activities outside your home, except for getting medical care.   2. Do not go to work, school, or public areas.   3. Avoid using public transportation, ride-sharing, or taxis.   4. Separate yourself from other people and animals in your home as much as possible.  When you are around other people (e.g., sharing a room or vehicle) you should wear a facemask.  5. Wash your hands often with soap and water for at least 20 seconds, especially after blowing your nose, coughing, or sneezing; going to the bathroom; and before eating or preparing food.  6. Cover your mouth and nose with a tissue when you cough or sneeze. Throw used tissues in a lined trash can. Immediately wash your hands with soap and water for at least 20 seconds  7. High touch surfaces include counters, tabletops, doorknobs, bathroom fixtures, toilets, phones, keyboards, tablets, and bedside tables.  8. Avoid sharing dishes, drinking glasses, cups, eating utensils, towels, or bedding with other people or pets in your home. After using these items, they should be washed thoroughly with soap and water.  9. You are strongly advised to seek prompt medical attention if your illness worsens or you develop new symptoms like fever or difficulty breathing, oxygen saturation <92%.  You are being discharge on ASPIRIN DAILY FOR 30 DAYS, OXYGEN SENSOR TO USE 3 TIMES A DAY, O2 SATURATION SHOULD BE >92%. Please follow up with your PCP in a week from discharge      Diagnosis: HTN (hypertension)  Assessment and Plan of Treatment: You have a history of Hypertension not taking any blood pressure medications at home.   On this admission, your Blood Pressure was adequately controlled WITH CARVEDILO AND, LASIX.     Your blood pressure target is 120-140/80-90, maintain healthy lifestyle, low salt diet, avoid fatty food, weight loss, exercise regularly as tolerated 30 mins X 3 times per week.  Notify your doctor if you have any of the following symptoms:   (Dizziness, Lightheadedness, Blurry vision, Headache, Chest pain, Shortness of breath.)  Please continue to take CARVEDILOL 3.125 MG TWICE A DAY, LASIX 40 MG ONCE A DAY and follow-up with your PCP in 1 week from discharge.      Diagnosis: Hyperlipidemia  Assessment and Plan of Treatment: You were diagnosed with Hyperlipidemia. On this admission you were found to have abnormal high lipid profile.  Please take ATORVASTATIN 40 MG DAILY as prescribed, maintain healthy lifestyle, low fat diet, exercise regularly and check your lipid levels routinely and follow up with your PCP in 1 week from discharge.      Diagnosis: DM (diabetes mellitus)  Assessment and Plan of Treatment: You have a history of diabetes not taking any medications at home.   Your HbA1c was 7.8 during this admission. You need to continue monitoring your blood sugar levels closely and maintain healthy lifestyle by eating healthy diabetic regimen, weight loss and exercise regularly as tolerated.  Please take METFORMIN 500 MG TWICE A DAY as prescribed and follow up with your PCP/Endocrinologist within a week of discharge.      Diagnosis: Anxiety  Assessment and Plan of Treatment: You have history of Anxiety on XANAX 0.5 MG TWICE A DAY AS NEEDED. Please continue taking your home medication and follow up with your PCP in a week from discharge.       PRINCIPAL DISCHARGE DIAGNOSIS  Diagnosis: CHF (congestive heart failure)  Assessment and Plan of Treatment: You came complaining of shortness of breath and chest pain. You were   diagnosed with CHF (congestive heart failure).  On this admission, your CXR showed bilateral pleural effusions which means fluid in your lungs. You were treated with IV Lasix which increases diuresis helping to relieve your shortness of breath and leg swelling. You were seen by Cardiologist, started on CARVEDILOL, LASIX AND LISINOPRIL.  You are recommended to weigh yourself daily and If you gain 3lbs in 3 days, or 5lbs in a week and /or have any swelling or increased swelling in your feet, ankles, and/or stomach call your Health Care Provider.  Do not eat or drink foods containing more than 2000 mg of salt (sodium) in your diet every day and limit your fluid intake to 1 Liter daily.  Please take CARVEDILOL 3.125 MG TWICE A DAY, LASIX 40 MG ONCE A DAY AND LISINOPRIL 20 MG DAILY as prescribed and follow up with Cardiologist Dr Fairchild on Tuesday 02/16/2021 at 2:30 pm for further recommendations and echocardiogram results.        SECONDARY DISCHARGE DIAGNOSES  Diagnosis: Demand ischemia of myocardium  Assessment and Plan of Treatment: You were diagnosed with Demand ischemia of myocardium. Your cardiac enzymes were elevated, EKG showed Q waves in leads V1-V3 most likely from congestive heart failure exacerbation. Your cardiac enzymes trended down and you did not complained of chest pain or shortness of breath. Please follow up with Cardiologist Dr Fairchild on Tuesday 02/16/2021 at 2:30 pm for further recommendations and echocardiogram results.    Diagnosis: COVID-19  Assessment and Plan of Treatment: You were diagnosed with COVID19 infection. You did not required oxygen supplementation, Decadron or Remdesivir.    Based on your current clinical status and stability, it has been determined that you no longer need hospitalization and can recover while remaining in self-quarantine at home till 2/14/2021. You should follow the prevention steps below:  1. You should restrict activities outside your home, except for getting medical care.   2. Do not go to work, school, or public areas.   3. Avoid using public transportation, ride-sharing, or taxis.   4. Separate yourself from other people and animals in your home as much as possible.  When you are around other people (e.g., sharing a room or vehicle) you should wear a facemask.  5. Wash your hands often with soap and water for at least 20 seconds, especially after blowing your nose, coughing, or sneezing; going to the bathroom; and before eating or preparing food.  6. Cover your mouth and nose with a tissue when you cough or sneeze. Throw used tissues in a lined trash can. Immediately wash your hands with soap and water for at least 20 seconds  7. High touch surfaces include counters, tabletops, doorknobs, bathroom fixtures, toilets, phones, keyboards, tablets, and bedside tables.  8. Avoid sharing dishes, drinking glasses, cups, eating utensils, towels, or bedding with other people or pets in your home. After using these items, they should be washed thoroughly with soap and water.  9. You are strongly advised to seek prompt medical attention if your illness worsens or you develop new symptoms like fever or difficulty breathing, oxygen saturation <92%.  You are being discharge on ASPIRIN DAILY, OXYGEN SENSOR TO USE 3 TIMES A DAY, O2 SATURATION SHOULD BE >92%. Please follow up with your PCP in a week from discharge.       Diagnosis: HTN (hypertension)  Assessment and Plan of Treatment: You have a history of Hypertension not taking any blood pressure medications at home.   On this admission, your Blood Pressure was adequately controlled WITH CARVEDILOL AND LASIX.     Your blood pressure target is 120-140/80-90, maintain healthy lifestyle, low salt diet, avoid fatty food, weight loss, exercise regularly as tolerated 30 mins X 3 times per week.  Notify your doctor if you have any of the following symptoms:   (Dizziness, Lightheadedness, Blurry vision, Headache, Chest pain, Shortness of breath.)  Please continue to take CARVEDILOL 3.125 MG TWICE A DAY, LASIX 40 MG ONCE A DAY and follow-up with your PCP in 1 week from discharge.      Diagnosis: Hyperlipidemia  Assessment and Plan of Treatment: You were diagnosed with Hyperlipidemia. On this admission you were found to have abnormal high lipid profile.  Please take ATORVASTATIN 40 MG DAILY as prescribed, maintain healthy lifestyle, low fat diet, exercise regularly and check your lipid levels routinely and follow up with your PCP in 1 week from discharge.      Diagnosis: DM (diabetes mellitus)  Assessment and Plan of Treatment: You have a history of diabetes not taking any medications at home.   Your HbA1c was 7.8 during this admission. You need to continue monitoring your blood sugar levels closely and maintain healthy lifestyle by eating healthy diabetic regimen, weight loss and exercise regularly as tolerated.  Please take METFORMIN 500 MG TWICE A DAY as prescribed and follow up with your PCP and Endocrinologist Dr Burch in a week from discharge.      Diagnosis: Anxiety  Assessment and Plan of Treatment: You have history of Anxiety on XANAX 0.5 MG TWICE A DAY AS NEEDED. Please continue taking your home medication and follow up with your PCP in a week from discharge.       PRINCIPAL DISCHARGE DIAGNOSIS  Diagnosis: CHF (congestive heart failure)  Assessment and Plan of Treatment: You came complaining of shortness of breath and chest pain. You were   diagnosed with CHF (congestive heart failure).  On this admission, your CXR showed bilateral pleural effusions which means fluid in your lungs. You were treated with IV Lasix which increases diuresis helping to relieve your shortness of breath and leg swelling. You were seen by Cardiologist, started on CARVEDILOL, LASIX AND LISINOPRIL.  You are recommended to weigh yourself daily and If you gain 3lbs in 3 days, or 5lbs in a week and /or have any swelling or increased swelling in your feet, ankles, and/or stomach call your Health Care Provider.  Do not eat or drink foods containing more than 2000 mg of salt (sodium) in your diet every day and limit your fluid intake to 1 Liter daily.  Please take CARVEDILOL 3.125 MG TWICE A DAY, LASIX 20 MG ONCE A DAY AND LISINOPRIL 2.5 MG DAILY as prescribed and follow up with Cardiologist Dr Fairchild on Tuesday 02/16/2021 at 2:30 pm for further recommendations and echocardiogram results. You are recommended to get a BMP withi in a week of discharge to monitor your kidney function since you are now receiving lasix and lisinopril.      SECONDARY DISCHARGE DIAGNOSES  Diagnosis: COVID-19  Assessment and Plan of Treatment: You were diagnosed with COVID19 infection. You did not required oxygen supplementation, Decadron or Remdesivir.    Based on your current clinical status and stability, it has been determined that you no longer need hospitalization and can recover while remaining in self-quarantine at home till 2/14/2021. You should follow the prevention steps below:  1. You should restrict activities outside your home, except for getting medical care.   2. Do not go to work, school, or public areas.   3. Avoid using public transportation, ride-sharing, or taxis.   4. Separate yourself from other people and animals in your home as much as possible.  When you are around other people (e.g., sharing a room or vehicle) you should wear a facemask.  5. Wash your hands often with soap and water for at least 20 seconds, especially after blowing your nose, coughing, or sneezing; going to the bathroom; and before eating or preparing food.  6. Cover your mouth and nose with a tissue when you cough or sneeze. Throw used tissues in a lined trash can. Immediately wash your hands with soap and water for at least 20 seconds  7. High touch surfaces include counters, tabletops, doorknobs, bathroom fixtures, toilets, phones, keyboards, tablets, and bedside tables.  8. Avoid sharing dishes, drinking glasses, cups, eating utensils, towels, or bedding with other people or pets in your home. After using these items, they should be washed thoroughly with soap and water.  9. You are strongly advised to seek prompt medical attention if your illness worsens or you develop new symptoms like fever or difficulty breathing, oxygen saturation <92%.  You are being discharge on ASPIRIN DAILY, OXYGEN SENSOR TO USE 3 TIMES A DAY, O2 SATURATION SHOULD BE >92%. Please follow up with your PCP in a week from discharge.       Diagnosis: Demand ischemia of myocardium  Assessment and Plan of Treatment: You were diagnosed with Demand ischemia of myocardium. Your cardiac enzymes were elevated, EKG showed Q waves in leads V1-V3 most likely from congestive heart failure exacerbation. Your cardiac enzymes trended down and you did not complained of chest pain or shortness of breath. Please follow up with Cardiologist Dr Fairchild on Tuesday 02/16/2021 at 2:30 pm for further recommendations and echocardiogram results.    Diagnosis: Hyperlipidemia  Assessment and Plan of Treatment: You were diagnosed with Hyperlipidemia. On this admission you were found to have abnormal high lipid profile.  Please take ATORVASTATIN 40 MG DAILY as prescribed, maintain healthy lifestyle, low fat diet, exercise regularly and check your lipid levels routinely and follow up with your PCP in 1 week from discharge.      Diagnosis: DM (diabetes mellitus)  Assessment and Plan of Treatment: You have a history of diabetes not taking any medications at home.   Your HbA1c was 7.8 during this admission. You need to continue monitoring your blood sugar levels closely and maintain healthy lifestyle by eating healthy diabetic regimen, weight loss and exercise regularly as tolerated.  Please take METFORMIN 500 MG TWICE A DAY as prescribed and follow up with your PCP and Endocrinologist Dr Burch in a week from discharge.      Diagnosis: HTN (hypertension)  Assessment and Plan of Treatment: You have a history of Hypertension not taking any blood pressure medications at home.   On this admission, your Blood Pressure was adequately controlled WITH LISINOPRIL, CARVEDILOL AND LASIX.     Your blood pressure target is 120-140/80-90, maintain healthy lifestyle, low salt diet, avoid fatty food, weight loss, exercise regularly as tolerated 30 mins X 3 times per week.  Notify your doctor if you have any of the following symptoms:   (Dizziness, Lightheadedness, Blurry vision, Headache, Chest pain, Shortness of breath.)  Please continue to take CARVEDILOL 3.125 MG TWICE A DAY, LASIX 40 MG ONCE A DAY and LISINOPRIL 2.5 MG ONCE A DAY and follow-up with your PCP in 1 week from discharge.       Diagnosis: Anxiety  Assessment and Plan of Treatment: You have history of Anxiety on XANAX 0.5 MG TWICE A DAY AS NEEDED. Please continue taking your home medication and follow up with your PCP in a week from discharge.

## 2025-01-07 NOTE — ED ADULT NURSE NOTE - [facility] EDP DISCH DISPOSITION ADMI
Problem: At Risk for Falls  Goal: Patient does not fall  1/6/2025 2145 by Franc Askew RN  Outcome: Monitoring/Evaluating progress  1/6/2025 2134 by Franc Askew RN  Outcome: Monitoring/Evaluating progress  Goal: Patient takes action to control fall-related risks  1/6/2025 2145 by Franc Askew RN  Outcome: Monitoring/Evaluating progress  1/6/2025 2134 by Franc Askew RN  Outcome: Monitoring/Evaluating progress     Problem: At Risk for Injury Due to Fall  Goal: Patient does not fall  1/6/2025 2145 by Franc Askew RN  Outcome: Monitoring/Evaluating progress  1/6/2025 2134 by Franc Askew RN  Outcome: Monitoring/Evaluating progress  Goal: Takes action to control condition specific risks  1/6/2025 2145 by Franc Askew RN  Outcome: Monitoring/Evaluating progress  1/6/2025 2134 by Franc Askew RN  Outcome: Monitoring/Evaluating progress  Goal: Verbalizes understanding of fall-related injury personal risks  Description: Document education using the patient education activity  1/6/2025 2145 by Franc Askew RN  Outcome: Monitoring/Evaluating progress  1/6/2025 2134 by Franc Askew RN  Outcome: Monitoring/Evaluating progress     Problem: Impaired Physical Mobility  Goal: Functional status is maintained or returned to baseline during hospitalization  1/6/2025 2145 by Franc Askew RN  Outcome: Monitoring/Evaluating progress  1/6/2025 2134 by Franc Askew RN  Outcome: Monitoring/Evaluating progress  Goal: Tolerates activity for discharge setting with no abnormal symptoms  1/6/2025 2145 by Franc Askew RN  Outcome: Monitoring/Evaluating progress  1/6/2025 2134 by Franc Askew RN  Outcome: Monitoring/Evaluating progress     Problem: Respiratory Function, Ineffective (with Ventilator)  Goal: Oxygenation/ventilation parameters are achieved/maintained without ventilator support (with/without artificial airway)  Description: Patients  are considered \"ready\" for weaning when there is the is some resolution in the acute phase of current illness, cardiac stability (HR<140) without vasopressors, afebrile (<38C), and adequate gas exchange (PaO2/FIO2 ratio>150-200 on lower PEEP/CPAP), and able to initiate inspiratory effort (Hieu, 2001)  Outcome: Met, complete goal  Goal: Verbalizes/demonstrates understanding of treatment plan and their role in successful weaning  Description: Document on Patient Education Activity  Outcome: Met, complete goal     Problem: Artificial Airway Management  Goal: # Maintains effective artificial airway  Outcome: Met, complete goal  Goal: # Maintains skin integrity around the airway  Outcome: Met, complete goal  Goal: Verbalizes understanding of artifical airway function and care  Description: Document on Patient Education Activity  Outcome: Met, complete goal     Problem: Skin Integrity Alteration  Goal: Skin remains intact with no new/deterioration of wound or pressure injury  1/6/2025 2145 by Franc Askew RN  Outcome: Monitoring/Evaluating progress  1/6/2025 2134 by Franc Askew RN  Outcome: Monitoring/Evaluating progress  Goal: Participates in wound care activities  1/6/2025 2145 by Franc Askew RN  Outcome: Monitoring/Evaluating progress  1/6/2025 2134 by Franc Askew RN  Outcome: Monitoring/Evaluating progress     Problem: Fluid Volume Excess  Goal: Fluid Volume Excess Symptoms Resolved  Description: Treatment often consists of oxygen and respiratory support with diuretic therapy at doses that exceed usual dose (typically doubled).  Monitor patient response to treatment.    Acute dyspnea should resolve quickly if dose is adequate and kidney function is adequate. Dyspnea/SOB should only be observed with Activity after effective treatment. Patient should be able to lie down comfortably, without SOB.  Outcome: Monitoring/Evaluating progress  Goal: Oxygenation is maintained (SpO2 greater  than or equal to 90% or as ordered)  Outcome: Monitoring/Evaluating progress  Goal: Verbalizes understanding of FVE management plan  Description: Document on Patient Education Activity  Outcome: Monitoring/Evaluating progress     Problem: Pain  Goal: Acceptable pain level achieved/maintained at rest using appropriate pain scale for the patient  Outcome: Monitoring/Evaluating progress  Goal: Acceptable pain level achieved/maintained with activity using appropriate pain scale for the patient  Outcome: Monitoring/Evaluating progress  Goal: Acceptable pain level achieved/maintained without oversedation  Outcome: Monitoring/Evaluating progress     Problem: Self Care Deficit  Goal: # Functional status is maintained or returned to baseline - REHAB ONLY  Outcome: Monitoring/Evaluating progress  Goal: Functional status is maintained or returned to baseline  Outcome: Monitoring/Evaluating progress  Goal: # Tolerates activity for d/c setting with no clinical problems  Outcome: Monitoring/Evaluating progress      [address]

## 2025-04-14 NOTE — DISCHARGE NOTE PROVIDER - NSDCHHATTENDCERT_GEN_ALL_CORE
Detail Level: Generalized
My signature below certifies that the above stated patient is homebound and upon completion of the Face-To-Face encounter, has the need for intermittent skilled nursing, physical therapy and/or speech or occupational therapy services in their home for their current diagnosis as outlined in their initial plan of care. These services will continue to be monitored by myself or another physician.